# Patient Record
Sex: FEMALE | Race: WHITE | NOT HISPANIC OR LATINO | Employment: FULL TIME | URBAN - METROPOLITAN AREA
[De-identification: names, ages, dates, MRNs, and addresses within clinical notes are randomized per-mention and may not be internally consistent; named-entity substitution may affect disease eponyms.]

---

## 2018-12-18 ENCOUNTER — OFFICE VISIT - HEALTHEAST (OUTPATIENT)
Dept: FAMILY MEDICINE | Facility: CLINIC | Age: 23
End: 2018-12-18

## 2018-12-18 DIAGNOSIS — Z30.40 ENCOUNTER FOR SURVEILLANCE OF CONTRACEPTIVES, UNSPECIFIED CONTRACEPTIVE: ICD-10-CM

## 2019-02-01 ENCOUNTER — OFFICE VISIT - HEALTHEAST (OUTPATIENT)
Dept: FAMILY MEDICINE | Facility: CLINIC | Age: 24
End: 2019-02-01

## 2019-02-01 DIAGNOSIS — S05.8X2A SUPERFICIAL INJURY OF CORNEA, LEFT, INITIAL ENCOUNTER: ICD-10-CM

## 2019-08-22 ENCOUNTER — COMMUNICATION - HEALTHEAST (OUTPATIENT)
Dept: SCHEDULING | Facility: CLINIC | Age: 24
End: 2019-08-22

## 2019-08-23 ENCOUNTER — OFFICE VISIT - HEALTHEAST (OUTPATIENT)
Dept: FAMILY MEDICINE | Facility: CLINIC | Age: 24
End: 2019-08-23

## 2019-08-23 DIAGNOSIS — H53.8 BLURRED VISION: ICD-10-CM

## 2019-08-23 DIAGNOSIS — Z11.3 ROUTINE SCREENING FOR STI (SEXUALLY TRANSMITTED INFECTION): ICD-10-CM

## 2019-08-23 DIAGNOSIS — G43.009 MIGRAINE WITHOUT AURA AND WITHOUT STATUS MIGRAINOSUS, NOT INTRACTABLE: ICD-10-CM

## 2019-08-23 DIAGNOSIS — Z30.40 ENCOUNTER FOR SURVEILLANCE OF CONTRACEPTIVES, UNSPECIFIED CONTRACEPTIVE: ICD-10-CM

## 2019-08-23 DIAGNOSIS — Z00.00 VISIT FOR PREVENTIVE HEALTH EXAMINATION: ICD-10-CM

## 2019-08-23 DIAGNOSIS — Z12.4 CERVICAL CANCER SCREENING: ICD-10-CM

## 2019-08-23 ASSESSMENT — MIFFLIN-ST. JEOR: SCORE: 1490.79

## 2019-08-26 LAB
BKR LAB AP ABNORMAL BLEEDING: NO
BKR LAB AP BIRTH CONTROL/HORMONES: NORMAL
BKR LAB AP CERVICAL APPEARANCE: NORMAL
BKR LAB AP GYN ADEQUACY: NORMAL
BKR LAB AP GYN INTERPRETATION: NORMAL
BKR LAB AP HPV REFLEX: NORMAL
BKR LAB AP LMP: NORMAL
BKR LAB AP PATIENT STATUS: NORMAL
BKR LAB AP PREVIOUS ABNORMAL: NORMAL
BKR LAB AP PREVIOUS NORMAL: NORMAL
C TRACH DNA SPEC QL PROBE+SIG AMP: NEGATIVE
HIGH RISK?: NO
N GONORRHOEA DNA SPEC QL NAA+PROBE: NEGATIVE
PATH REPORT.COMMENTS IMP SPEC: NORMAL
RESULT FLAG (HE HISTORICAL CONVERSION): NORMAL

## 2019-10-21 ENCOUNTER — OFFICE VISIT - HEALTHEAST (OUTPATIENT)
Dept: FAMILY MEDICINE | Facility: CLINIC | Age: 24
End: 2019-10-21

## 2019-10-21 DIAGNOSIS — J01.90 ACUTE NON-RECURRENT SINUSITIS, UNSPECIFIED LOCATION: ICD-10-CM

## 2019-10-21 DIAGNOSIS — J02.9 SORE THROAT: ICD-10-CM

## 2019-10-21 LAB — DEPRECATED S PYO AG THROAT QL EIA: NORMAL

## 2019-10-22 LAB — GROUP A STREP BY PCR: NORMAL

## 2019-12-18 ENCOUNTER — OFFICE VISIT - HEALTHEAST (OUTPATIENT)
Dept: FAMILY MEDICINE | Facility: CLINIC | Age: 24
End: 2019-12-18

## 2019-12-18 DIAGNOSIS — K21.9 GASTROESOPHAGEAL REFLUX DISEASE WITHOUT ESOPHAGITIS: ICD-10-CM

## 2019-12-22 ENCOUNTER — OFFICE VISIT - HEALTHEAST (OUTPATIENT)
Dept: FAMILY MEDICINE | Facility: CLINIC | Age: 24
End: 2019-12-22

## 2019-12-22 DIAGNOSIS — Z20.828 EXPOSURE TO INFLUENZA: ICD-10-CM

## 2019-12-22 DIAGNOSIS — R05.9 COUGH: ICD-10-CM

## 2019-12-22 LAB
FLUAV AG SPEC QL IA: NORMAL
FLUBV AG SPEC QL IA: NORMAL

## 2020-02-10 ENCOUNTER — OFFICE VISIT - HEALTHEAST (OUTPATIENT)
Dept: FAMILY MEDICINE | Facility: CLINIC | Age: 25
End: 2020-02-10

## 2020-02-10 DIAGNOSIS — N90.7 LABIAL CYST: ICD-10-CM

## 2020-02-11 ENCOUNTER — COMMUNICATION - HEALTHEAST (OUTPATIENT)
Dept: FAMILY MEDICINE | Facility: CLINIC | Age: 25
End: 2020-02-11

## 2020-02-14 ENCOUNTER — RECORDS - HEALTHEAST (OUTPATIENT)
Dept: ADMINISTRATIVE | Facility: OTHER | Age: 25
End: 2020-02-14

## 2020-02-22 ENCOUNTER — OFFICE VISIT - HEALTHEAST (OUTPATIENT)
Dept: FAMILY MEDICINE | Facility: CLINIC | Age: 25
End: 2020-02-22

## 2020-02-22 DIAGNOSIS — R10.11 RIGHT UPPER QUADRANT ABDOMINAL PAIN: ICD-10-CM

## 2020-02-24 ENCOUNTER — OFFICE VISIT - HEALTHEAST (OUTPATIENT)
Dept: FAMILY MEDICINE | Facility: CLINIC | Age: 25
End: 2020-02-24

## 2020-02-24 DIAGNOSIS — R10.11 RUQ ABDOMINAL PAIN: ICD-10-CM

## 2020-02-25 ENCOUNTER — HOSPITAL ENCOUNTER (OUTPATIENT)
Dept: ULTRASOUND IMAGING | Facility: CLINIC | Age: 25
Discharge: HOME | End: 2020-02-25
Attending: FAMILY MEDICINE

## 2020-02-25 DIAGNOSIS — R10.11 RUQ ABDOMINAL PAIN: ICD-10-CM

## 2020-02-25 LAB
ALBUMIN SERPL-MCNC: 4.1 G/DL (ref 3.5–5)
ALP SERPL-CCNC: 67 U/L (ref 45–120)
ALT SERPL W P-5'-P-CCNC: 15 U/L (ref 0–45)
ANION GAP SERPL CALCULATED.3IONS-SCNC: 12 MMOL/L (ref 5–18)
AST SERPL W P-5'-P-CCNC: 18 U/L (ref 0–40)
BILIRUB SERPL-MCNC: 0.9 MG/DL (ref 0–1)
BUN SERPL-MCNC: 12 MG/DL (ref 8–22)
CALCIUM SERPL-MCNC: 9.4 MG/DL (ref 8.5–10.5)
CHLORIDE BLD-SCNC: 103 MMOL/L (ref 98–107)
CO2 SERPL-SCNC: 22 MMOL/L (ref 22–31)
CREAT SERPL-MCNC: 0.67 MG/DL (ref 0.6–1.1)
GFR SERPL CREATININE-BSD FRML MDRD: >60 ML/MIN/1.73M2
GLUCOSE BLD-MCNC: 87 MG/DL (ref 70–125)
POTASSIUM BLD-SCNC: 3.9 MMOL/L (ref 3.5–5)
PROT SERPL-MCNC: 7 G/DL (ref 6–8)
SODIUM SERPL-SCNC: 137 MMOL/L (ref 136–145)

## 2020-02-26 ENCOUNTER — AMBULATORY - HEALTHEAST (OUTPATIENT)
Dept: FAMILY MEDICINE | Facility: CLINIC | Age: 25
End: 2020-02-26

## 2020-02-26 DIAGNOSIS — R10.13 ABDOMINAL PAIN, EPIGASTRIC: ICD-10-CM

## 2020-03-24 ENCOUNTER — OFFICE VISIT - HEALTHEAST (OUTPATIENT)
Dept: FAMILY MEDICINE | Facility: CLINIC | Age: 25
End: 2020-03-24

## 2020-03-24 ENCOUNTER — COMMUNICATION - HEALTHEAST (OUTPATIENT)
Dept: FAMILY MEDICINE | Facility: CLINIC | Age: 25
End: 2020-03-24

## 2020-03-24 DIAGNOSIS — K21.9 GASTROESOPHAGEAL REFLUX DISEASE WITHOUT ESOPHAGITIS: ICD-10-CM

## 2020-08-11 ENCOUNTER — OFFICE VISIT - HEALTHEAST (OUTPATIENT)
Dept: FAMILY MEDICINE | Facility: CLINIC | Age: 25
End: 2020-08-11

## 2020-08-11 DIAGNOSIS — L73.9 HAIR FOLLICLE INFECTION: ICD-10-CM

## 2020-09-28 ENCOUNTER — COMMUNICATION - HEALTHEAST (OUTPATIENT)
Dept: INTERNAL MEDICINE | Facility: CLINIC | Age: 25
End: 2020-09-28

## 2020-09-28 ENCOUNTER — OFFICE VISIT - HEALTHEAST (OUTPATIENT)
Dept: INTERNAL MEDICINE | Facility: CLINIC | Age: 25
End: 2020-09-28

## 2020-09-28 DIAGNOSIS — K21.9 GASTROESOPHAGEAL REFLUX DISEASE WITHOUT ESOPHAGITIS: ICD-10-CM

## 2020-09-28 DIAGNOSIS — Z00.00 ANNUAL PHYSICAL EXAM: ICD-10-CM

## 2020-09-28 DIAGNOSIS — N89.8 VAGINAL DISCHARGE: ICD-10-CM

## 2020-09-28 DIAGNOSIS — Z76.89 ENCOUNTER TO ESTABLISH CARE: ICD-10-CM

## 2020-09-28 LAB
CLUE CELLS: NORMAL
HIV 1+2 AB+HIV1 P24 AG SERPL QL IA: NEGATIVE
TRICHOMONAS, WET PREP: NORMAL
YEAST, WET PREP: NORMAL

## 2020-09-28 ASSESSMENT — MIFFLIN-ST. JEOR: SCORE: 1488.63

## 2020-09-29 LAB
C TRACH DNA SPEC QL PROBE+SIG AMP: NEGATIVE
N GONORRHOEA DNA SPEC QL NAA+PROBE: NEGATIVE
T PALLIDUM AB SER QL: NEGATIVE

## 2020-09-30 ENCOUNTER — COMMUNICATION - HEALTHEAST (OUTPATIENT)
Dept: INTERNAL MEDICINE | Facility: CLINIC | Age: 25
End: 2020-09-30

## 2020-10-05 ENCOUNTER — OFFICE VISIT - HEALTHEAST (OUTPATIENT)
Dept: INTERNAL MEDICINE | Facility: CLINIC | Age: 25
End: 2020-10-05

## 2020-10-22 ENCOUNTER — COMMUNICATION - HEALTHEAST (OUTPATIENT)
Dept: FAMILY MEDICINE | Facility: CLINIC | Age: 25
End: 2020-10-22

## 2020-10-22 DIAGNOSIS — J02.9 SORE THROAT: ICD-10-CM

## 2020-10-23 RX ORDER — ALBUTEROL SULFATE 90 UG/1
2 AEROSOL, METERED RESPIRATORY (INHALATION) EVERY 6 HOURS PRN
Qty: 18 G | Refills: 3 | Status: SHIPPED | OUTPATIENT
Start: 2020-10-23 | End: 2021-11-24

## 2020-12-17 ENCOUNTER — COMMUNICATION - HEALTHEAST (OUTPATIENT)
Dept: INTERNAL MEDICINE | Facility: CLINIC | Age: 25
End: 2020-12-17

## 2020-12-18 ENCOUNTER — OFFICE VISIT - HEALTHEAST (OUTPATIENT)
Dept: FAMILY MEDICINE | Facility: CLINIC | Age: 25
End: 2020-12-18

## 2020-12-18 DIAGNOSIS — T15.92XA FOREIGN BODY OF LEFT EXTERNAL EYE, INITIAL ENCOUNTER: ICD-10-CM

## 2020-12-18 DIAGNOSIS — R03.0 ELEVATED BLOOD PRESSURE READING WITHOUT DIAGNOSIS OF HYPERTENSION: ICD-10-CM

## 2020-12-18 ASSESSMENT — MIFFLIN-ST. JEOR: SCORE: 1488.86

## 2020-12-23 ENCOUNTER — COMMUNICATION - HEALTHEAST (OUTPATIENT)
Dept: INTERNAL MEDICINE | Facility: CLINIC | Age: 25
End: 2020-12-23

## 2020-12-30 ENCOUNTER — OFFICE VISIT - HEALTHEAST (OUTPATIENT)
Dept: FAMILY MEDICINE | Facility: CLINIC | Age: 25
End: 2020-12-30

## 2020-12-30 ENCOUNTER — COMMUNICATION - HEALTHEAST (OUTPATIENT)
Dept: INTERNAL MEDICINE | Facility: CLINIC | Age: 25
End: 2020-12-30

## 2020-12-30 DIAGNOSIS — R30.0 DYSURIA: ICD-10-CM

## 2020-12-30 DIAGNOSIS — Z20.828 EXPOSURE TO HERPES SIMPLEX VIRUS (HSV): ICD-10-CM

## 2020-12-30 DIAGNOSIS — B96.89 BACTERIAL VAGINOSIS: ICD-10-CM

## 2020-12-30 DIAGNOSIS — N89.8 VAGINAL IRRITATION: ICD-10-CM

## 2020-12-30 DIAGNOSIS — N76.0 BACTERIAL VAGINOSIS: ICD-10-CM

## 2020-12-30 LAB
ALBUMIN UR-MCNC: NEGATIVE MG/DL
APPEARANCE UR: CLEAR
BACTERIA #/AREA URNS HPF: ABNORMAL HPF
BILIRUB UR QL STRIP: NEGATIVE
CLUE CELLS: ABNORMAL
COLOR UR AUTO: YELLOW
GLUCOSE UR STRIP-MCNC: NEGATIVE MG/DL
HGB UR QL STRIP: NEGATIVE
KETONES UR STRIP-MCNC: NEGATIVE MG/DL
LEUKOCYTE ESTERASE UR QL STRIP: ABNORMAL
NITRATE UR QL: NEGATIVE
PH UR STRIP: 7.5 [PH] (ref 5–8)
RBC #/AREA URNS AUTO: ABNORMAL HPF
SP GR UR STRIP: 1.02 (ref 1–1.03)
SQUAMOUS #/AREA URNS AUTO: ABNORMAL LPF
TRANS CELLS #/AREA URNS HPF: ABNORMAL LPF
TRICHOMONAS, WET PREP: ABNORMAL
UROBILINOGEN UR STRIP-ACNC: ABNORMAL
WBC #/AREA URNS AUTO: ABNORMAL HPF
YEAST, WET PREP: ABNORMAL

## 2020-12-31 ENCOUNTER — COMMUNICATION - HEALTHEAST (OUTPATIENT)
Dept: INTERNAL MEDICINE | Facility: CLINIC | Age: 25
End: 2020-12-31

## 2020-12-31 LAB
HSV1 IGG SERPL QL IA: NEGATIVE
HSV2 IGG SERPL QL IA: POSITIVE

## 2021-01-01 ENCOUNTER — AMBULATORY - HEALTHEAST (OUTPATIENT)
Dept: FAMILY MEDICINE | Facility: CLINIC | Age: 26
End: 2021-01-01

## 2021-01-01 DIAGNOSIS — N39.0 URINARY TRACT INFECTION WITHOUT HEMATURIA, SITE UNSPECIFIED: ICD-10-CM

## 2021-01-01 LAB — BACTERIA SPEC CULT: ABNORMAL

## 2021-01-28 ENCOUNTER — COMMUNICATION - HEALTHEAST (OUTPATIENT)
Dept: SCHEDULING | Facility: CLINIC | Age: 26
End: 2021-01-28

## 2021-01-28 ENCOUNTER — OFFICE VISIT - HEALTHEAST (OUTPATIENT)
Dept: INTERNAL MEDICINE | Facility: CLINIC | Age: 26
End: 2021-01-28

## 2021-01-28 DIAGNOSIS — N89.8 VAGINAL DISCHARGE: ICD-10-CM

## 2021-01-28 DIAGNOSIS — R05.9 COUGH: ICD-10-CM

## 2021-01-28 DIAGNOSIS — J02.9 SORE THROAT: ICD-10-CM

## 2021-01-28 RX ORDER — ACYCLOVIR 400 MG/1
TABLET ORAL
Status: SHIPPED | COMMUNITY
Start: 2020-12-22 | End: 2021-11-24

## 2021-01-29 ENCOUNTER — AMBULATORY - HEALTHEAST (OUTPATIENT)
Dept: FAMILY MEDICINE | Facility: CLINIC | Age: 26
End: 2021-01-29

## 2021-01-29 DIAGNOSIS — R05.9 COUGH: ICD-10-CM

## 2021-01-29 DIAGNOSIS — J02.9 SORE THROAT: ICD-10-CM

## 2021-01-30 ENCOUNTER — COMMUNICATION - HEALTHEAST (OUTPATIENT)
Dept: SCHEDULING | Facility: CLINIC | Age: 26
End: 2021-01-30

## 2021-01-30 ENCOUNTER — COMMUNICATION - HEALTHEAST (OUTPATIENT)
Dept: INTERNAL MEDICINE | Facility: CLINIC | Age: 26
End: 2021-01-30

## 2021-01-30 LAB
SARS-COV-2 PCR COMMENT: ABNORMAL
SARS-COV-2 RNA SPEC QL NAA+PROBE: POSITIVE
SARS-COV-2 VIRUS SPECIMEN SOURCE: ABNORMAL

## 2021-02-05 ENCOUNTER — COMMUNICATION - HEALTHEAST (OUTPATIENT)
Dept: INTERNAL MEDICINE | Facility: CLINIC | Age: 26
End: 2021-02-05

## 2021-02-10 ENCOUNTER — COMMUNICATION - HEALTHEAST (OUTPATIENT)
Dept: INTERNAL MEDICINE | Facility: CLINIC | Age: 26
End: 2021-02-10

## 2021-02-10 ENCOUNTER — OFFICE VISIT - HEALTHEAST (OUTPATIENT)
Dept: INTERNAL MEDICINE | Facility: CLINIC | Age: 26
End: 2021-02-10

## 2021-02-10 DIAGNOSIS — N89.8 VAGINAL CYST: ICD-10-CM

## 2021-02-10 DIAGNOSIS — L72.0 EPIDERMOID CYST OF SKIN: ICD-10-CM

## 2021-02-10 DIAGNOSIS — N89.8 VAGINAL DISCHARGE: ICD-10-CM

## 2021-02-10 LAB
CLUE CELLS: NORMAL
TRICHOMONAS, WET PREP: NORMAL
YEAST, WET PREP: NORMAL

## 2021-02-18 ENCOUNTER — RECORDS - HEALTHEAST (OUTPATIENT)
Dept: ADMINISTRATIVE | Facility: OTHER | Age: 26
End: 2021-02-18

## 2021-03-18 ENCOUNTER — COMMUNICATION - HEALTHEAST (OUTPATIENT)
Dept: INTERNAL MEDICINE | Facility: CLINIC | Age: 26
End: 2021-03-18

## 2021-04-19 ENCOUNTER — COMMUNICATION - HEALTHEAST (OUTPATIENT)
Dept: INTERNAL MEDICINE | Facility: CLINIC | Age: 26
End: 2021-04-19

## 2021-05-31 NOTE — PROGRESS NOTES
Mer,  The results on your Pap smear and chlamydia screening have come back and  both look normal.  Recommendation would be to repeat your Pap smear in 3 years.  Please let me know if you are having continued visual symptoms but hope those resolved.  Thank you,  Asa Barclay MD

## 2021-05-31 NOTE — TELEPHONE ENCOUNTER
Triage call:     Patient is calling with reports of visual changes that have been intermittent over the last week to week and a half. Patient reports that the episodes are very brief and 90% of the day she can see well. Describes it that it is like there is light shining in her eye or its blurry. Has never been to an eye doctor, denies additional symptoms- no pain. Not currently present. Noticed mainly in the right eye. 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 Advised her to ask about a recommended eye doctor to get an eye exam at her appointment- patient verbalizes understanding.      Triaged to be seen within the next 3 days- reviewed additional care advice with patient and she verbalizes understanding. Patient warm transferred to scheduling for appointment. Appointment with Dr Barclay scheduled for tomorrow @ 1:40 pm     Vinita Estrella RN BSBA [facility] Triage/Med Refill 8/22/2019 11:41 AM     Reason for Disposition    Brief (now gone) blurred vision and unexplained    Protocols used: VISION LOSS OR CHANGE-A-OH

## 2021-05-31 NOTE — PROGRESS NOTES
Assessment:      Healthy female exam.      1. Encounter for surveillance of contraceptives, unspecified contraceptive    - SPRINTEC, 28, 0.25-35 mg-mcg per tablet; Take 1 tablet by mouth daily.  Dispense: 3 Package; Refill: 3  The patient has been off of her oral contraceptive for about a year  Last menstrual period 2019 and cycles are every 28 to 32 days and her period lasts 5 to 7 days moderate flow with no pain.   0  Using condoms for contraception  Two lifelong partners    2. Cervical cancer screening    - Gynecologic Cytology (PAP Smear)  Pap smear 3 years ago negative and has had chlamydia and GC screening in the past which is been negative; due for both    3. Routine screening for STI (sexually transmitted infection)    - Chlamydia trachomatis & Neisseria gonorrhoeae, Amplified Detection  No symptoms such as unexplained vaginal discharge burning with urination or pelvic pain    4. Visit for preventive health examination  The patient has intermittent stridor or wheezing which is relatively brief with no shortness of breath but she will occasionally use a albuterol inhaler.  Is fairly random but at times seems to be related to exertion but not always    5. Blurred vision  Symptoms started 10 days ago most days intermittent lasting up to 15 minutes with a sense of a bright light shining into her eyes and blurry vision with difficulty focusing.  The patient has had a history of migraine headaches 1 every month to every other month but never has aura or scintillations scotomas but does have photosensitivity.  Other than the brief episodes of blurred vision her vision seems fine with no headache associated with the symptoms.  No double vision    If her symptoms persist or get worse she needs to be seen by an eye clinic.  Optometry would be fine to make sure a dilated exam is done to look at the back of the eye.  If those results are normal and her symptoms persist then would need to consider other  referrals and imaging.     Plan:       Await pap smear results.  Chlamydia specimen.  Follow up: If Pap smear normal 3-year follow-up    Subjective:      Mer Gonzalez is a 24 y.o. female who presents for an annual exam. The patient is sexually active. The patient participates in regular exercise: no. The patient reports that there is not domestic violence in her life.      Healthy Habits:   Regular Exercise: No   Is active both at work and particularly in the summer likes to walk and bike  Sunscreen Use: No  Healthy Diet: No  Dental Visits Regularly: Yes  Seat Belt: Yes  Sexually active: Yes  Self Breast Exam Monthly:Yes  Hemoccults: No  Flex Sig: No  Colonoscopy: No  Lipid Profile: Yes  Glucose Screen: Yes  Prevention of Osteoporosis: Yes and Eats dairy  Last Dexa: No  Guns at Home:  No      Immunization History   Administered Date(s) Administered     DTP 1995, 1995, 05/20/1996, 07/29/1996, 09/05/2000     DTP / HiB 1995, 1995, 05/20/1996, 07/29/1996     DTaP / HiB 1995, 1995, 05/20/1996, 07/29/1996     Dtap 09/05/2000     HPV Quadrivalent 05/09/2007, 07/09/2007     Hep B, Adult 1995, 1995, 05/20/1996     Hep B, Peds or Adolescent 1995, 1995, 05/20/1996, 12/18/2001, 01/15/2002, 08/20/2002     Hep B, historic 12/18/2001, 01/15/2002, 08/20/2002     HiB, historic,unspecified 1995, 1995, 05/20/1996, 07/29/1996     IPV 09/05/2000     Influenza, Live, Nasal LAIV3 11/04/2014     Influenza,live, Nasal Laiv4 11/04/2014     Influenza,seasonal quad, PF, 36+MOS 10/27/2017, 12/18/2018     MMR 07/29/1996     MMRV 09/06/2007     Meningococcal MCV4O 11/04/2014, 11/04/2014     OPV,Trivalent,Historic([id number] only) 1995, 1995, 05/20/1996     POLIO, Unspecified 1995, 1995, 05/20/1996, 09/05/2000     Tdap 09/06/2007, 01/09/2018     Immunization status: up to date and documented.       Gynecologic History  Patient's last menstrual period  was 08/01/2019.  Contraception: condoms  Last Pap: 10/14/16. Results were: normal  Last mammogram: N/A. Results were: N/A      OB History   No data available       Current Outpatient Medications   Medication Sig Dispense Refill     albuterol (PROAIR HFA;PROVENTIL HFA;VENTOLIN HFA) 90 mcg/actuation inhaler Inhale 2 puffs.       SPRINTEC, 28, 0.25-35 mg-mcg per tablet Take 1 tablet by mouth daily. 3 Package 3     No current facility-administered medications for this visit.      History reviewed. No pertinent past medical history.  History reviewed.  History of tonsillectomy  Amoxicillin and Latex  Family History   Problem Relation Age of Onset     Kidney cancer Father         age 48     Allergic rhinitis Sister      Social History     Socioeconomic History     Marital status: Single     Spouse name: Not on file     Number of children: Not on file     Years of education: Not on file     Highest education level: Not on file   Occupational History     Not on file   Social Needs     Financial resource strain: Not on file     Food insecurity:     Worry: Not on file     Inability: Not on file     Transportation needs:     Medical: Not on file     Non-medical: Not on file   Tobacco Use     Smoking status: Never Smoker     Smokeless tobacco: Never Used   Substance and Sexual Activity     Alcohol use: Not Currently     Drug use: Never     Sexual activity: Yes     Partners: Male   Lifestyle     Physical activity:     Days per week: Not on file     Minutes per session: Not on file     Stress: Not on file   Relationships     Social connections:     Talks on phone: Not on file     Gets together: Not on file     Attends Baptism service: Not on file     Active member of club or organization: Not on file     Attends meetings of clubs or organizations: Not on file     Relationship status: Not on file     Intimate partner violence:     Fear of current or ex partner: Not on file     Emotionally abused: Not on file     Physically  "abused: Not on file     Forced sexual activity: Not on file   Other Topics Concern     Not on file   Social History Narrative     Not on file       Review of Systems  Review of Systems   Constitutional: Negative.    HENT: Negative.    Eyes: Positive for visual disturbance.   Respiratory: Positive for wheezing.         Wheezing is intermittent random and not associated with shortness of breath.  She feels like it is more of a problem with stridor in her upper throat area and actually coming from her lungs although does use albuterol inhaler   Cardiovascular: Negative.    Gastrointestinal: Negative.    Endocrine: Negative.    Genitourinary: Negative.    Musculoskeletal: Negative.    Skin:        Some dryness to her skin with mild rash   Allergic/Immunologic: Negative.    Neurological: Positive for headaches.        Migraine headache without aura once a month or once every other month treated with over-the-counter medications and not causing significant disability   Hematological: Negative.    Psychiatric/Behavioral: Negative.              Objective:         Vitals:    08/23/19 1328 08/23/19 1427   BP: 140/81 120/88   Pulse: 90    SpO2: 99%    Weight: 180 lb 3.2 oz (81.7 kg)    Height: 5' 0.75\" (1.543 m)      Body mass index is 34.33 kg/m .    Physical  Physical Exam   Constitutional: She is oriented to person, place, and time. She appears well-developed and well-nourished. No distress.   HENT:   Head: Normocephalic and atraumatic.   Nose: Nose normal.   Mouth/Throat: Oropharynx is clear and moist. No oropharyngeal exudate.   Eyes: Pupils are equal, round, and reactive to light. EOM are normal. Right eye exhibits no discharge. Left eye exhibits no discharge. No scleral icterus.   Neck: Normal range of motion. No thyromegaly present.   Cardiovascular: Normal rate, regular rhythm, normal heart sounds and intact distal pulses.   No murmur heard.  Pulmonary/Chest: Effort normal and breath sounds normal. She has no wheezes. "   Abdominal: Soft. Bowel sounds are normal. She exhibits no mass. There is no tenderness.   Genitourinary: Vagina normal and uterus normal. No vaginal discharge found.   Genitourinary Comments: Cervix is mildly inflamed with minimal bleeding related to the Pap smear.  No cervical motion tenderness uterus midline normal size no adnexal tenderness or masses   Musculoskeletal: Normal range of motion. She exhibits no edema or deformity.   Lymphadenopathy:     She has no cervical adenopathy.   Neurological: She is alert and oriented to person, place, and time. No cranial nerve deficit.   Skin: Skin is warm. No rash noted.   Psychiatric: She has a normal mood and affect. Her behavior is normal. Judgment and thought content normal.   Nursing note and vitals reviewed.

## 2021-06-02 VITALS — WEIGHT: 180 LBS

## 2021-06-02 VITALS — WEIGHT: 179 LBS

## 2021-06-02 NOTE — PROGRESS NOTES
Chief Complaint   Patient presents with     Sore Throat     Pt c/o sore throat, body aches, pressure in sinus and head, ears hurting and itching, R sinus draining, chills       HPI: 24-year-old female with past medical history of occasional reactive airway disease and migraine headache presents today with sore throat, body aches, wheezing and congestion as well as pain in her maxillary sinuses and upper teeth for the past 2 to 3 days in duration of moderate intensity.  This is in the context of her boyfriend having sinus infection.    ROS: No fever.  No vomiting or diarrhea.    SH:    reports that she has never smoked. She has never used smokeless tobacco. She reports previous alcohol use. She reports that she does not use drugs.      FH: The Patient's family history includes Allergic rhinitis in her sister; Kidney cancer in her father.     Meds:  Mer has a current medication list which includes the following prescription(s): sprintec (28), albuterol, and cefdinir.    O:  /88   Pulse 97   Temp 98  F (36.7  C)   Resp 16   Wt 181 lb (82.1 kg)   SpO2 98%   BMI 34.48 kg/m     Constitutional:    --Vitals as above  --No acute distress  Eyes-  --Sclera noninjected  --Lids and conjunctiva normal  ENT-  --TMs clear  --Sclera noninjected  --Pharynx moderately erythematous  --Pain to palpation of the maxillary sinuses left greater than right  Neck-  --Neck supple    Lymph-  --mild cervical lymphadenopathy  Lungs-  --Clear to Auscultation  Heart-  --Regular rate and rhythm  Skin-  --Pink and dry          A/P:   1.  Wheezing  Refilled inhaler to be used only occasionally.  The patient rarely uses inhaler when not ill.  - Rapid Strep A Screen-Throat  - Group A Strep, RNA Direct Detection, Throat  - albuterol (PROAIR HFA;PROVENTIL HFA;VENTOLIN HFA) 90 mcg/actuation inhaler; Inhale 2 puffs every 6 (six) hours as needed for wheezing.  Dispense: 1 Inhaler; Refill: 3    2. Acute non-recurrent sinusitis, unspecified  location  The patient has allergies to amoxicillin.  Given her other symptoms and the side effects of Levaquin will use Omnicef instead.  Recommended Lookout pot for nasal irrigation.  - cefdinir (OMNICEF) 300 MG capsule; Take 1 capsule (300 mg total) by mouth 2 (two) times a day for 14 days.  Dispense: 28 capsule; Refill: 0

## 2021-06-03 VITALS
WEIGHT: 188 LBS | RESPIRATION RATE: 14 BRPM | OXYGEN SATURATION: 97 % | HEART RATE: 89 BPM | TEMPERATURE: 98.2 F | BODY MASS INDEX: 35.82 KG/M2 | SYSTOLIC BLOOD PRESSURE: 130 MMHG | DIASTOLIC BLOOD PRESSURE: 80 MMHG

## 2021-06-03 VITALS — HEIGHT: 61 IN | BODY MASS INDEX: 34.02 KG/M2 | WEIGHT: 180.2 LBS

## 2021-06-03 VITALS
SYSTOLIC BLOOD PRESSURE: 130 MMHG | TEMPERATURE: 98 F | BODY MASS INDEX: 34.48 KG/M2 | DIASTOLIC BLOOD PRESSURE: 88 MMHG | OXYGEN SATURATION: 98 % | RESPIRATION RATE: 16 BRPM | WEIGHT: 181 LBS | HEART RATE: 97 BPM

## 2021-06-03 VITALS
RESPIRATION RATE: 16 BRPM | DIASTOLIC BLOOD PRESSURE: 78 MMHG | WEIGHT: 184.5 LBS | OXYGEN SATURATION: 98 % | TEMPERATURE: 98 F | SYSTOLIC BLOOD PRESSURE: 120 MMHG | HEART RATE: 89 BPM | BODY MASS INDEX: 35.15 KG/M2

## 2021-06-04 VITALS
BODY MASS INDEX: 30.76 KG/M2 | SYSTOLIC BLOOD PRESSURE: 134 MMHG | HEART RATE: 66 BPM | WEIGHT: 173.6 LBS | DIASTOLIC BLOOD PRESSURE: 88 MMHG | HEIGHT: 63 IN

## 2021-06-04 VITALS
DIASTOLIC BLOOD PRESSURE: 86 MMHG | BODY MASS INDEX: 35.66 KG/M2 | TEMPERATURE: 97.4 F | HEART RATE: 99 BPM | WEIGHT: 187.2 LBS | OXYGEN SATURATION: 96 % | SYSTOLIC BLOOD PRESSURE: 128 MMHG

## 2021-06-04 VITALS
SYSTOLIC BLOOD PRESSURE: 124 MMHG | WEIGHT: 188 LBS | RESPIRATION RATE: 16 BRPM | DIASTOLIC BLOOD PRESSURE: 84 MMHG | BODY MASS INDEX: 35.82 KG/M2 | HEART RATE: 88 BPM | OXYGEN SATURATION: 99 % | TEMPERATURE: 98 F

## 2021-06-04 VITALS
RESPIRATION RATE: 16 BRPM | SYSTOLIC BLOOD PRESSURE: 124 MMHG | WEIGHT: 164.56 LBS | HEART RATE: 88 BPM | TEMPERATURE: 98.4 F | BODY MASS INDEX: 31.35 KG/M2 | OXYGEN SATURATION: 98 % | DIASTOLIC BLOOD PRESSURE: 84 MMHG

## 2021-06-04 VITALS
OXYGEN SATURATION: 99 % | DIASTOLIC BLOOD PRESSURE: 89 MMHG | BODY MASS INDEX: 35.45 KG/M2 | WEIGHT: 186.1 LBS | HEART RATE: 95 BPM | SYSTOLIC BLOOD PRESSURE: 129 MMHG

## 2021-06-04 NOTE — PROGRESS NOTES
[facility]      CHIEF COMPLAINT/REASON FOR VISIT:  Chief Complaint   Patient presents with     Cough     cough, feverish, headache, pressure, chills nasal congestion started Wed was exposed to influenza       HISTORY:      HPI: Mer is a 24 y.o. female who started no feeling well last Wednesday when she was exposed to influenza by her sister. She shares she really got sick on Friday. She has since gotten worse with the past 24 hours being the worst part of the illness. She has cough, fever, headache, sinus pressure, nasal congestion. Mer denies any other concerns including vision changes, chest pain/pressure, difficulty breathing, SOB, abdominal pain, nausea, vomiting, diarrhea, dysuria, increasing weakness, increasing pain.     No past medical history on file.          Family History   Problem Relation Age of Onset     Kidney cancer Father         age 48     Allergic rhinitis Sister      Social History     Socioeconomic History     Marital status: Single     Spouse name: Not on file     Number of children: Not on file     Years of education: Not on file     Highest education level: Not on file   Occupational History     Not on file   Social Needs     Financial resource strain: Not on file     Food insecurity:     Worry: Not on file     Inability: Not on file     Transportation needs:     Medical: Not on file     Non-medical: Not on file   Tobacco Use     Smoking status: Never Smoker     Smokeless tobacco: Never Used   Substance and Sexual Activity     Alcohol use: Not Currently     Drug use: Never     Sexual activity: Yes     Partners: Male   Lifestyle     Physical activity:     Days per week: Not on file     Minutes per session: Not on file     Stress: Not on file   Relationships     Social connections:     Talks on phone: Not on file     Gets together: Not on file     Attends Tenriism service: Not on file     Active member of club or organization: Not on file     Attends meetings of  clubs or organizations: Not on file     Relationship status: Not on file     Intimate partner violence:     Fear of current or ex partner: Not on file     Emotionally abused: Not on file     Physically abused: Not on file     Forced sexual activity: Not on file   Other Topics Concern     Not on file   Social History Narrative     Not on file       REVIEW OF SYSTEM:  Per HPI    PHYSICAL EXAM:   /80 (Patient Site: Right Arm, Patient Position: Sitting, Cuff Size: Adult Regular)   Pulse 89   Temp 98.2  F (36.8  C)   Resp 14   Wt 188 lb (85.3 kg)   SpO2 97%   BMI 35.82 kg/m    General appearance: alert, appears stated age and cooperative  HEENT: Head is normocephalic with normal hair distribution. No evidence of trauma. Ears: Without lesions or deformity. No acute purulent discharge. Eyes: Conjunctivae pink with no scleral icterus or erythema. Nose: Normal mucosa and septum. Oropharnyx: mmm, no lesions present.  Lungs: clear to auscultation bilaterally, respirations without effort  Heart: regular rate and rhythm, S1, S2 normal, no murmur, click, rub or gallop  Abdomen: soft, non-tender; bowel sounds normal; no masses,  no organomegaly  Extremities: extremities normal, atraumatic, no cyanosis or edema  Pulses: 2+ and symmetric  Skin: Skin color, texture, turgor normal. No rashes or lesions  Neurologic: Grossly normal   Psych: interacts well with caregivers, exhibits logical thought processes and connections, pleasant      LABS:   influenza    ASSESSMENT:      ICD-10-CM    1. Cough R05 Influenza A/B Rapid Test- Nasal Swab   2. Exposure to influenza Z20.828 oseltamivir (TAMIFLU) 75 MG capsule       PLAN:    Influenza B  -Tamiflu 75 mg by mouth twice daily x5 days, given due to the fact that illness continues to worsen.  -Encourage fluids and supportive cares.  -Encouraged strict handwashing, covering cough, and keeping room neat and clean.   -Encouraged steam baths if patient can tolerate to help loosen  phlegm.  -Encouraged use of hot tea with honey to help loosen phlegm and clear cough.  -Close monitoring and follow up warranted.    All questions answered to patient's and father's satisfaction. No further questions posed, no comments or issues to report. Patient advised to return to primary care provider, urgent care or ER with any further complaints, issues or concerns.    Electronically signed by: Halley Salinas CNP

## 2021-06-04 NOTE — PROGRESS NOTES
"Chief Complaint   Patient presents with     Cough     Pt c/o throwing up in her mouth yesterday she coughed and now her throat hurts and burns       HPI: Very pleasant 24-year-old female who works for the [employer] [facility] [facility] [employer] presents today with feelings of burning acid in her throat.  She notes that twice over the last 2 to 3 weeks she had episodes where she had a mild episode of vomiting with acid in her throat and thereby burning her throat.  One time she states that it \"went through the wrong pipe\".  She has never had reflux in the past.  Is not associated abdominal pain or nausea.    ROS: No cough or congestion.  No shortness of breath.  No abdominal pain.  No bowel or bladder issues.    SH:    reports that she has never smoked. She has never used smokeless tobacco. She reports previous alcohol use. She reports that she does not use drugs.      FH: The Patient's family history includes Allergic rhinitis in her sister; Kidney cancer in her father.     Meds:  Mer has a current medication list which includes the following prescription(s): albuterol, sprintec (28), and omeprazole.    O:  /78   Pulse 89   Temp 98  F (36.7  C)   Resp 16   Wt 184 lb 8 oz (83.7 kg)   SpO2 98%   BMI 35.15 kg/m    Alert conversant no acute distress  Skin Pink and dry  ENT-normal, Pharynx not erythematous neck is supple  Lungs-clear to auscultation  Abdomen soft nontender no masses    A/P:   1. Gastroesophageal reflux disease without esophagitis  The patient has symptoms of GERD.  Will treat with omeprazole, and if not improving would consider further work-up including EGD.  No evidence of gallbladder involvement at this time.  - omeprazole (PRILOSEC) 20 MG capsule; Take 1 capsule (20 mg total) by mouth daily before breakfast.  Dispense: 30 capsule; Refill: 1                                          "

## 2021-06-05 VITALS
SYSTOLIC BLOOD PRESSURE: 140 MMHG | HEART RATE: 79 BPM | OXYGEN SATURATION: 99 % | DIASTOLIC BLOOD PRESSURE: 86 MMHG | WEIGHT: 172 LBS | BODY MASS INDEX: 31.46 KG/M2

## 2021-06-05 VITALS
SYSTOLIC BLOOD PRESSURE: 152 MMHG | BODY MASS INDEX: 32.28 KG/M2 | WEIGHT: 175.4 LBS | OXYGEN SATURATION: 98 % | HEIGHT: 62 IN | HEART RATE: 70 BPM | DIASTOLIC BLOOD PRESSURE: 105 MMHG

## 2021-06-06 NOTE — TELEPHONE ENCOUNTER
It appears Yoselyn faxed this order to [facility] on 02/11/20.   If anything further is needed from me, please let me know :)    ~ [facility]

## 2021-06-06 NOTE — TELEPHONE ENCOUNTER
Yoselyn,  Please see the patient's comment that she never received a call from OB/GYN yet.  I saw the patient just yesterday and placed the referral at that time.  Not sure if that just gets faxed to the GYN clinic and they call her or if we need to follow-up with calling the patient?  Please help the patient out.  Thank you,  Dr. Barclay

## 2021-06-06 NOTE — PROGRESS NOTES
Please let patient know that her ultrasound and lab work are all normal.  I would recommend doing an EGD to make sure that she does not have an ulcer.  I will place referral.

## 2021-06-06 NOTE — TELEPHONE ENCOUNTER
With June being out of the office we may need to check with the patient to see if she has been notified from the GYN clinic about an appointment for her?  If that has not happened we will have to have the backup specially  which I think is from [facility] assist in getting the referral sent to [facility].    Thank you,  Dr. Barclay

## 2021-06-06 NOTE — PROGRESS NOTES
Chief Complaint   Patient presents with     Abdominal Pain     Pt c/o upper abdominal pain, she was seen in urgent care. She was seen [facility] and given Septra, she only took one dose as her stomach hurt after taking it       HPI: Very pleasant 24-year-old female with a complicating past history of GERD, presents today with pain in her right upper quadrant and midepigastrium.  It occurred Friday night, early Saturday morning after eating greasy food.  She has a family history of gallbladder disease.  She never vomited and there is no nausea but there is abdominal pain.  It does not wake her up at night.  Food makes it worse.  She currently is not taking PPIs.    She also had been on Septra for a vaginal cyst.  When she went to the walk-in clinic 2 days ago she was told to discontinue that medication.  That note was reviewed.    ROS: No vaginal discharge.  No vomiting.  No rashes.  No change in bowel habit.    SH: Reviewed-see Snapshot for review.     FH: Reviewed-see Snapshot for review.    Meds:  Mer has a current medication list which includes the following prescription(s): albuterol and sulfamethoxazole-trimethoprim.    O:  /84   Pulse 88   Temp 98  F (36.7  C)   Resp 16   Wt 188 lb (85.3 kg)   LMP 02/12/2020 (Exact Date)   SpO2 99%   BMI 35.82 kg/m    Constitutional:    --Vitals as above  --No acute distress  Eyes-  --Sclera noninjected  --Lids and conjunctiva normal  Abdomen-  --Soft nontender no masses no guarding no rebound  Skin-  --Pink and dry    A/P:   1. RUQ abdominal pain  Most likely represents gallbladder disease although cannot rule out GERD or peptic ulcer disease.  Will check ultrasound, LFTs, and if all of those are normal would consider EGD.  - US Abdomen Limited; Future  - Comprehensive Metabolic Panel    2.  Vaginal cyst  -Follow-up with OB/GYN for vaginal cyst.

## 2021-06-06 NOTE — PROGRESS NOTES
ASSESSMENT/PLAN:       1. Labial cyst. Left      - Ambulatory referral to Gynecology  Because this has been a recurrent issue over the last few years and typically in the same location I like to have GYN see her.  She will continue with warm sits baths.  I did not start an antibiotic and we talked about STDs contributing to these sorts of cysts but she feels that there is no risk of that and thus no need to do any testing.  Of note is that she did have a Pap smear with STI screening in August 2019 which was negative    Her blood pressure is better today than at the last visit but still borderline high and will need to continue to monitor that.        Asa Barclay MD      PROGRESS NOTE   2/10/2020    SUBJECTIVE:  Mer Gonzalez is a 24 y.o. female  who presents for   Chief Complaint   Patient presents with     OTHER     Possible cyst on vagina, lump that keeps getting larger and is very painful  and has been there for about a month and a half      The patient is here with a cyst in the labial area on the left side that has fluctuated in size over the last 6 weeks.  She has had problems with this in the past and on one occasion did have a cyst incised and drained.  This particular cyst has drained some but yesterday it was quite a bit larger than it had been over the last 6 weeks.  She has done some warm sitz baths.  She feels that her risk for a STI is low.  She is not had any urinary symptoms.  Last menstrual period 2/8/2020.  She no longer is on oral contraceptives.  Note that her blood pressure is still borderline high.    Patient Active Problem List   Diagnosis     Migraine without aura and without status migrainosus, not intractable       Current Outpatient Medications   Medication Sig Dispense Refill     albuterol (PROAIR HFA;PROVENTIL HFA;VENTOLIN HFA) 90 mcg/actuation inhaler Inhale 2 puffs every 6 (six) hours as needed for wheezing. 1 Inhaler 3     SPRINTEC, 28, 0.25-35 mg-mcg per tablet Take 1 tablet by  mouth daily. 3 Package 3     No current facility-administered medications for this visit.        Social History     Tobacco Use   Smoking Status Never Smoker   Smokeless Tobacco Never Used           OBJECTIVE:        No results found for this or any previous visit (from the past 240 hour(s)).    Vitals:    02/10/20 1148 02/10/20 1149   BP: 131/90 129/89   Pulse: 95    SpO2: 99%    Weight: 186 lb 1.6 oz (84.4 kg)      Weight: 186 lb 1.6 oz (84.4 kg)          Physical Exam:  GENERAL APPEARANCE: 24-year-old female NAD, well hydrated, well nourished  SKIN:  Normal skin turgor, no lesions/rashes   genital exam:External genitalia reveals a labial minor cyst that is located at the mid position of the labia minor and it seems to point outward towards the skin rather than internally.  There is no active drainage or redness and it is about 1-1/2 cm in diameter.  No other external lesions were noted.  Speculum exam reveals actively menstruating female.  EXTREMITY: no edema and full ROM of all joints  NEURO: no focal findings

## 2021-06-07 NOTE — PROGRESS NOTES
THIS IS A TELEPHONE VISIT.    Abdominal pain      HPI: Patient presents after ER follow-up.  Was seen on 3/6/2020 emergency department.  Also had an endoscopy on 2/28/2020 and that was reviewed.  In summary the endoscopy showed mild irritation at the duodenum but no other pathology.  Patient felt better on PPIs.  Currently taking omeprazole twice daily.  Patient is being evaluated by telephone as [place] has restricted clinic visits due to the concern about coronavirus.    ROS: No vomiting or diarrhea.  No abdominal pain currently.  She is feeling much better.    SH: Reviewed-see Snapshot for review.     FH: Reviewed-see Snapshot for review.    Meds:  Mer has a current medication list which includes the following prescription(s): albuterol and sulfamethoxazole-trimethoprim.    O:  LMP 03/06/2020   Unable to assess    A/P:   1. Gastroesophageal reflux disease without esophagitis  The patient has symptoms consistent with GERD.  We discussed symptoms, causes, and I agreed to refill medication which was sent to [facility] pharmacy.  If not improving would consider HIDA scan.  - omeprazole (PRILOSEC) 20 MG capsule; Take 1 capsule (20 mg total) by mouth 2 (two) times a day before meals.  Dispense: 180 capsule; Refill: 1    12 minutes total time spent caring for patient.

## 2021-06-10 NOTE — PROGRESS NOTES
"S:  Patient presents for 2 issues.  The first are lesions on her left inner thigh and outer thigh.  They have been occurring over the past month, worse when she is hiking, and occasionally they will \"pop\" and she will get purulent material out.    She is wishes to discuss birth control.  She has questions about birth control and what type to use.    O:   Blood pressure 124/84 pulse 88 respirations 16 temperature 98.4  With my nurse in the room examination of the inner and outer thigh on the left shows 2 infected follicles.  (Patient notes that she shaves closely particularly in the perianal and perivaginal region.    A:   Infected follicle  Request for contraception    P:   We discussed the infected follicles and I encouraged the patient not to be aggressive and shaving.  This most likely is resulting infected follicles.  If getting worse would consider dermatology referral.  She will dress the lesions with bacitracin when open.    We discussed contraception and if patient decides to move forward will consider low-dose oral contraceptive as opposed to patches.  "

## 2021-06-12 NOTE — TELEPHONE ENCOUNTER
Refill Approved    Rx renewed per Medication Renewal Policy. Medication was last renewed on 10/21/19.    Charely Miguel, [facility] Triage/Med Refill 10/23/2020     Requested Prescriptions   Pending Prescriptions Disp Refills     albuterol (PROAIR HFA;PROVENTIL HFA;VENTOLIN HFA) 90 mcg/actuation inhaler [Pharmacy Med Name: Albuterol Sulfate HFA Inhalation Aerosol Solution 108 (90 Base) MCG/ACT] 18 g 0     Sig: Inhale 2 puffs every 6 (six) hours as needed for wheezing.       Albuterol/Levalbuterol Refill Protocol Passed - 10/22/2020 11:54 AM        Passed - PCP or prescribing provider visit in last year     Last office visit with prescriber/PCP: 8/11/2020 Wendy Gayle MD OR same dept: 8/11/2020 Wendy Gayle MD OR same specialty: 8/11/2020 Wendy Gayle MD Last physical: Visit date not found       Next appt within 3 mo: Visit date not found  Next physical within 3 mo: Visit date not found  Prescriber OR PCP: Wendy Gayle MD  Last diagnosis associated with med order: 1. Sore throat  - albuterol (PROAIR HFA;PROVENTIL HFA;VENTOLIN HFA) 90 mcg/actuation inhaler [Pharmacy Med Name: Albuterol Sulfate HFA Inhalation Aerosol Solution 108 (90 Base) MCG/ACT]; Inhale 2 puffs every 6 (six) hours as needed for wheezing.  Dispense: 18 g; Refill: 0    If protocol passes may refill for 6 months if within 3 months of last provider visit (or a total of 9 months). If patient requesting >1 inhaler per month refill x 6 months and have patient make appointment with provider.

## 2021-06-13 NOTE — PROGRESS NOTES
Impression:  Foreign body left eye    Procedure: Foreign body removal: Left eye was anesthetized with topical Alcaine drops.  The foreign body was easily removed with a cotton swab.  Patient tolerated the procedure well    Plan:  Her blood pressure is elevated today.  May be due to stress and pain.  She should have that rechecked in the near future and follow-up if it continues to be elevated.  Regarding the foreign body, she has an appointment on Monday to [facility].  If she is continuing to have any symptoms at that time she should go ahead and see [facility].      Chief Complaint:  Chief Complaint   Patient presents with     Eye Trauma     something in right eye since august         HPI:   Mer Gonzalez is a 25 y.o. female who presents to this clinic for the evaluation of foreign body sensation left eye.  Patient noticed a foreign body sensation in her left eye yesterday.  It continues to be there today.  She does not recall getting anything into the eye recently.  It is on the medial aspect of the left sclera.  Has not done any metal grinding.  No decrease in vision.  No redness in the eye.  She notices a small brownish spot on the medial left sclera      PMH:   No past medical history on file.  Past Surgical History:   Procedure Laterality Date     TONSILLECTOMY       WISDOM TOOTH EXTRACTION  2011         ROS:  All other systems negative    Meds:    Current Outpatient Medications:      albuterol (PROAIR HFA;PROVENTIL HFA;VENTOLIN HFA) 90 mcg/actuation inhaler, Inhale 2 puffs every 6 (six) hours as needed for wheezing., Disp: 18 g, Rfl: 3     omeprazole (PRILOSEC) 20 MG capsule, Take 1 capsule (20 mg total) by mouth 2 (two) times a day before meals., Disp: 180 capsule, Rfl: 1        Social:  Social History     Socioeconomic History     Marital status: Single     Spouse name: Not on file     Number of children: Not on file     Years of education: Not on file     Highest education level: Not on file  "  Occupational History     Not on file   Social Needs     Financial resource strain: Not on file     Food insecurity     Worry: Not on file     Inability: Not on file     Transportation needs     Medical: Not on file     Non-medical: Not on file   Tobacco Use     Smoking status: Never Smoker     Smokeless tobacco: Never Used   Substance and Sexual Activity     Alcohol use: Not Currently     Drug use: Never     Sexual activity: Not Currently     Partners: Male     Birth control/protection: Condom   Lifestyle     Physical activity     Days per week: Not on file     Minutes per session: Not on file     Stress: Not on file   Relationships     Social connections     Talks on phone: Not on file     Gets together: Not on file     Attends Islam service: Not on file     Active member of club or organization: Not on file     Attends meetings of clubs or organizations: Not on file     Relationship status: Not on file     Intimate partner violence     Fear of current or ex partner: Not on file     Emotionally abused: Not on file     Physically abused: Not on file     Forced sexual activity: Not on file   Other Topics Concern     Not on file   Social History Narrative     Not on file         Physical Exam:  Vitals:    12/18/20 1651   BP: (!) 152/105   Patient Site: Left Arm   Patient Position: Sitting   Cuff Size: Adult Regular   Pulse: 70   SpO2: 98%   Weight: 175 lb 6.4 oz (79.6 kg)   Height: 5' 2\" (1.575 m)      PERRL EOMI there is no corneal or conjunctival injection.  Left medial sclera shows possible small superficial brown or gray-colored foreign body.  Anterior chamber is clear.  Left cornea was stained with fluorescein and examined under a Woods lamp and shows no foreign body or corneal abrasion      Results:    No results found for this or any previous visit (from the past 24 hour(s)).    No results found.      Joni Farley MD  "

## 2021-06-13 NOTE — TELEPHONE ENCOUNTER
Reason for Call:  Other call back      Detailed comments: patient was checking status of this mychart msg. Per patient, she looked closer into her eye and saw something in her eye.    Phone Number Patient can be reached at: Home number on file [phone] (home)    Best Time: anytime    Can we leave a detailed message on this number?: Yes    Call taken on 12/18/2020 at 12:03 PM by Emily Heath

## 2021-06-14 NOTE — TELEPHONE ENCOUNTER
Patient says she is COVID-19 positive and wants to know what she should do.  Patient reports slight fever (99.5 F), cough, nasal congestion and drainage.  Reviewed care advice with caller per RN triage protocol re: home care, reasons to call back and timeframe to be seen by a healthcare provider.      Caller verbalized understanding.        Teodora Wilson RN/[facility]          Reason for Disposition    [1] COVID-19 diagnosed by positive lab test AND [2] mild symptoms (e.g., cough, fever, others) AND [3] no complications or SOB    Additional Information    Negative: SEVERE difficulty breathing (e.g., struggling for each breath, speaks in single words)    Negative: Difficult to awaken or acting confused (e.g., disoriented, slurred speech)    Negative: Bluish (or gray) lips or face now    Negative: Shock suspected (e.g., cold/pale/clammy skin, too weak to stand, low BP, rapid pulse)    Negative: Sounds like a life-threatening emergency to the triager    Negative: SEVERE or constant chest pain or pressure (Exception: mild central chest pain, present only when coughing)    Negative: MODERATE difficulty breathing (e.g., speaks in phrases, SOB even at rest, pulse 100-120)    Negative: [1] Headache AND [2] stiff neck (can't touch chin to chest)    Negative: MILD difficulty breathing (e.g., minimal/no SOB at rest, SOB with walking, pulse <100)    Negative: Chest pain or pressure    Negative: Patient sounds very sick or weak to the triager    Negative: Fever > 103 F (39.4 C)    Negative: [1] Fever > 101 F (38.3 C) AND [2] age > 60    Negative: [1] Fever > 100.0 F (37.8 C) AND [2] bedridden (e.g., nursing home patient, CVA, chronic illness, recovering from surgery)    Negative: [1] HIGH RISK patient (e.g., age > 64 years, diabetes, heart or lung disease, weak immune system) AND [2] new or worsening symptoms    Negative: [1] HIGH RISK patient AND [2] influenza is widespread in the community AND [3] ONE OR MORE  respiratory symptoms: cough, sore throat, runny or stuffy nose    Negative: [1] HIGH RISK patient AND [2] influenza exposure within the last 7 days AND [3] ONE OR MORE respiratory symptoms: cough, sore throat, runny or stuffy nose    Negative: Fever present > 3 days (72 hours)    Negative: [1] Fever returns after gone for over 24 hours AND [2] symptoms worse or not improved    Negative: [1] Continuous (nonstop) coughing interferes with work or school AND [2] no improvement using cough treatment per protocol    Negative: [1] COVID-19 infection suspected by caller or triager AND [2] mild symptoms (cough, fever, or others) AND [3] no complications or SOB    Negative: Cough present > 3 weeks    Protocols used: CORONAVIRUS (COVID-19) DIAGNOSED OR [id number] 1.3.21

## 2021-06-14 NOTE — TELEPHONE ENCOUNTER
Symptoms started yesterday    Nasal congestion - but nose is not blocked    No fever - 98.4    Cough    No breathing issues    Slight sore throat    Slight body aches    No ear pain    No headache    No nausea    No vomiting    No diarrhea    No chest pain or tightness    No wheezing    Per protocol patient should be seen today    COVID 19 Nurse Triage Plan/Patient Instructions    Please be aware that novel coronavirus (COVID-19) may be circulating in the community. If you develop symptoms such as fever, cough, or SOB or if you have concerns about the presence of another infection including coronavirus (COVID-19), please contact your health care provider or visit [email or web address].     Disposition/Instructions    Virtual Visit with provider recommended. Reference Visit Selection Guide.    Thank you for taking steps to prevent the spread of this virus.  o Limit your contact with others.  o Wear a simple mask to cover your cough.  o Wash your hands well and often.    Resources    [facility]: About COVID-19: [email or web address]    CDC: What to Do If You're Sick: www.cdc.gov/coronavirus/2019-ncov/about/steps-when-sick.html    CDC: Ending Home Isolation: www.cdc.gov/coronavirus/2019-ncov/hcp/disposition-in-home-patients.html     CDC: Caring for Someone: www.cdc.gov/coronavirus/2019-ncov/if-you-are-sick/care-for-someone.html     [facility]: Interim Guidance for Hospital Discharge to Home: www.health.[facility].[place]/diseases/coronavirus/hcp/hospdischarge.pdf    [facility] clinical trials (COVID-19 research studies): [email or web address]-clinical-trials     Below are the COVID-19 hotlines at the [place] Department of Health ([facility]). Interpreters are available.   o For health questions: Call 835-350-0715 or 1-703.313.4827 (7 a.m. to 7 p.m.)  o For questions about schools and childcare: Call 494-819-5257 or 1-923.529.4580 (7 a.m. to 7 p.m.)       Afua Santiago RN   [facility] Medication  Refill and Triage Nurse  9:46 AM  1/28/2021      Reason for Disposition    Colds with no complications    [1] COVID-19 infection suspected by caller or triager AND [2] mild symptoms (cough, fever, or others) AND [3] no complications or SOB    Additional Information    Negative: SEVERE difficulty breathing (e.g., struggling for each breath, speaks in single words)    Negative: Very weak (can't stand)    Negative: Sounds like a life-threatening emergency to the triager    Negative: Fever > 103 F (39.4 C)    Negative: Fever > 101 F (38.3 C) and over 60 years of age    Negative: Fever > 100.0 F (37.8 C) and has diabetes mellitus or a weak immune system (e.g., HIV positive, cancer chemotherapy, organ transplant, splenectomy, chronic steroids)    Negative: Fever > 100.0 F (37.8 C) and bedridden (e.g., nursing home patient, stroke, chronic illness, recovering from surgery)    Negative: Fever present > 3 days (72 hours)    Negative: Fever returns after gone for over 24 hours and symptoms worse or not improved    Negative: Sinus pain (not just congestion) and fever    Negative: Earache    Negative: Sinus congestion (pressure, fullness) present > 10 days    Negative: Nasal discharge present > 10 days    Negative: Using nasal washes and pain medicine > 24 hours and sinus pain (lower forehead, cheekbone, or eye) persists    Negative: Patient wants to be seen    Negative: Sore throat present > 5 days    Negative: SEVERE difficulty breathing (e.g., struggling for each breath, speaks in single words)    Negative: Difficult to awaken or acting confused (e.g., disoriented, slurred speech)    Negative: Bluish (or gray) lips or face now    Negative: Shock suspected (e.g., cold/pale/clammy skin, too weak to stand, low BP, rapid pulse)    Negative: Sounds like a life-threatening emergency to the triager    Negative: SEVERE or constant chest pain or pressure (Exception: mild central chest pain, present only when coughing)    Negative:  MODERATE difficulty breathing (e.g., speaks in phrases, SOB even at rest, pulse 100-120)    Negative: [1] Headache AND [2] stiff neck (can't touch chin to chest)    Negative: MILD difficulty breathing (e.g., minimal/no SOB at rest, SOB with walking, pulse <100)    Negative: Chest pain or pressure    Negative: Patient sounds very sick or weak to the triager    Negative: Fever > 103 F (39.4 C)    Negative: [1] Fever > 101 F (38.3 C) AND [2] age > 60    Negative: [1] Fever > 100.0 F (37.8 C) AND [2] bedridden (e.g., nursing home patient, CVA, chronic illness, recovering from surgery)    Negative: [1] HIGH RISK patient (e.g., age > 64 years, diabetes, heart or lung disease, weak immune system) AND [2] new or worsening symptoms    Negative: [1] HIGH RISK patient AND [2] influenza is widespread in the community AND [3] ONE OR MORE respiratory symptoms: cough, sore throat, runny or stuffy nose    Negative: [1] HIGH RISK patient AND [2] influenza exposure within the last 7 days AND [3] ONE OR MORE respiratory symptoms: cough, sore throat, runny or stuffy nose    Negative: Fever present > 3 days (72 hours)    Negative: [1] Fever returns after gone for over 24 hours AND [2] symptoms worse or not improved    Negative: [1] Continuous (nonstop) coughing interferes with work or school AND [2] no improvement using cough treatment per protocol    Protocols used: COMMON COLD-A-OH, CORONAVIRUS (COVID-19) DIAGNOSED OR [id number]-W-ST 1.3.21

## 2021-06-14 NOTE — PROGRESS NOTES
Mer Gonzalez is a 25 y.o. female who is being evaluated via a billable telephone visit.      What phone number would you like to be contacted at? [phone]  How would you like to obtain your AVS? AVS Preference: [place].  Assessment & Plan     Mer was seen today for cough.    Diagnoses and all orders for this visit:    Vaginal discharge: Yellow in color, foul smelling. Recent treatment for a bacterial infection, she finished all medication. Spotting over the weekend with some cramping. Discussed she needs to be seen in person for follow up for a pelvic exam. ER if symptoms worsen prior to COVID testing coming back.     Sore throat/Cough: Started yesterday, dry cough, and sore throat. No known exposure. Will order a COVID test.   -     Symptomatic COVID-19 Virus (CORONAVIRUS) PCR; Future        No follow-ups on file.    Lesly Mauro, [facility]     Mer Gonzalez is 25 y.o. and presents to clinic today for the following health issues   HPI The patient reports a one day course of a sore throat and a cough. She denies a fever, chills, body aches, or loss of sense of taste or smell. She would like to get COVID tested.    She also reports having her period from 01/07-01/14. She denies any chance of pregnancy. She reports that starting about a week ago, she started spotting again. She reports over the weekend having some cramping and lower abdominal pain. She reports that today she had some yellowish discharge. She reports recently being treated for a bacterial infection and she finished this medication. She denies any urinary symptoms. She reports that the discharge is foul smelling.     Phone call duration: 8 minutes

## 2021-06-14 NOTE — PROGRESS NOTES
Clinic Note    Assessment:     Assessment and Plan:  1. Dysuria  Urinalysis is equivocal for UTI.  The sample is contaminated.  She feels like her dysuria symptoms have gotten remarkably better over the last 24 to 48 hours and so she is okay waiting for the UC results before starting an antibiotic  - Urinalysis-UC if Indicated    2. Vaginal irritation  Clue cells seen on wet prep.  She was sent home with a prescription for Flagyl  - Wet Prep, Vaginal    3. Exposure to herpes simplex virus (HSV)  - Herpes simplex Virus (Type 1 and 2) IgG Antibody       Patient Instructions   You have bacterial vaginosis.  We will treat this with Flagyl (metronidazole) 500 mg twice daily for 7 days.    Do not use alcohol while using this medication.    Try to abstain from sexual activity for the next week or so.    Your urinalysis is equivocal for UTI.  Because your symptoms have resolved, we will wait for a urine culture before sending in additional antibiotics to treat a UTI.    We will check some blood work today to evaluate for potential HSV (herpes simplex virus) those results we made available to you on [employer].           Subjective:      Mer Gonzalez is a 25 y.o. female who comes the clinic today for some dysuria symptoms as well as vaginal irritation.    She actually had an ophthalmology visit earlier today for some eye discomfort.  The ophthalmologist told her that there were some suspicious lesions on her retina that could be associated with herpes.  She became concerned and wanted to check serology IgG today at the clinic.    She has some increased frequency and urgency.    Having some whitish discolored vaginal discharge that is atypical for her.  No odor.  No itching.    She did have a wet prep done this past September.  Those results were negative.    The following portions of the patient's history were reviewed and updated as appropriate: Allergies, medications, problem list, prior note.     Review of  Systems:    Review is otherwise negative except for what is mentioned above.     Social Hx:    Social History     Tobacco Use   Smoking Status Never Smoker   Smokeless Tobacco Never Used         Objective:   There were no vitals filed for this visit.    Exam:    General: No apparent distress. Calm. Alert and Oriented X3. Pt behavior is appropriate.      Patient Active Problem List   Diagnosis     Migraine without aura and without status migrainosus, not intractable     Scoliosis     Current Outpatient Medications   Medication Sig Dispense Refill     albuterol (PROAIR HFA;PROVENTIL HFA;VENTOLIN HFA) 90 mcg/actuation inhaler Inhale 2 puffs every 6 (six) hours as needed for wheezing. 18 g 3     omeprazole (PRILOSEC) 20 MG capsule Take 1 capsule (20 mg total) by mouth 2 (two) times a day before meals. 180 capsule 1     No current facility-administered medications for this visit.            Liang Fong CNP (Rob)    12/30/2020

## 2021-06-14 NOTE — TELEPHONE ENCOUNTER
Coronavirus (COVID-19) Notification    Reason for call  Notify of POSITIVE  COVID-19 lab result, assess symptoms,  review [facility] recommendations    Lab Result   Lab test for 2019-nCoV rRt-PCR or SARS-COV-2 PCR  Oropharyngeal AND/OR nasopharyngeal swabs were POSITIVE for 2019-nCoV RNA [OR] SARS-COV-2 RNA (COVID-19) RNA     We have been unable to reach Patient by phone at this time to notify of their Positive COVID-19 result.  Unablle to leave a voicemail message requesting a call back to [phone] [facility] for results due to invalid phone number.        POSITIVE COVID-19 Letter sent.    Melvina Cuevas LPN

## 2021-06-14 NOTE — PATIENT INSTRUCTIONS - HE
You have bacterial vaginosis.  We will treat this with Flagyl (metronidazole) 500 mg twice daily for 7 days.    Do not use alcohol while using this medication.    Try to abstain from sexual activity for the next week or so.    Your urinalysis is equivocal for UTI.  Because your symptoms have resolved, we will wait for a urine culture before sending in additional antibiotics to treat a UTI.    We will check some blood work today to evaluate for potential HSV (herpes simplex virus) those results we made available to you on [employer].

## 2021-06-15 NOTE — PATIENT INSTRUCTIONS - HE
Bactrim DS as prescribed to help with the vaginal cyst. I have referred you to [facility] to discuss removal of this. Please see Dr. Brianna Daley, ([phone]) if they do not call you within the next few days.    I have also referred you to General surgery to remove the buttock epidermoid cyst. Call [phone] if they do not call you within the next week.    Monitor symptoms closely, follow up if having worsening pain, swelling, drainage, fever, chills, nausea, or vomiting.

## 2021-06-15 NOTE — PROGRESS NOTES
Clinic Note    Assessment:     Assessment and Plan:  1. Vaginal discharge: Scant amount of white vaginal discharge noted on exam today. Slight labial itching. Wet prep today was negative.   - Wet Prep, Vaginal    2. Vaginal cyst: Left inner vaginal area, 1cm round cyst, will treat with Bactrim DS. Will also refer to Gynecology. Discussed home supportive measures.  - Ambulatory referral to Gynecology  - sulfamethoxazole-trimethoprim (SEPTRA) 400-80 mg per tablet; Take 1 tablet by mouth 2 (two) times a day for 10 days.  Dispense: 20 tablet; Refill: 0  - Sitz baths.  -Monitor area closely, follow up if worsening as discussed.     3. Epidermoid cyst of skin: Left buttock fold. Will refer to general surgery for removal. Area gets inflamed and is painful when working out.  - Ambulatory referral to General Surgery       Patient Instructions   Bactrim DS as prescribed to help with the vaginal cyst. I have referred you to [facility] to discuss removal of this. Please see Dr. Brianna Daley, ([phone]) if they do not call you within the next few days.    I have also referred you to General surgery to remove the buttock epidermoid cyst. Call [phone] if they do not call you within the next week.    Monitor symptoms closely, follow up if having worsening pain, swelling, drainage, fever, chills, nausea, or vomiting.     Return if symptoms worsen or fail to improve.         Subjective:      Mer Gonzalez is a 25 y.o. female presents today with concerns of a left sided vaginal cyst.    She reports that she has been getting these cysts every couple of months for years. This time it is much more painful then prior. She reports that she has some swollen lymph nodes on the left side this time in her groin. She denies a fever, chills, nausea, or vomiting.    She denies any real vaginal discharge but does report some labial itching.    The cyst has not drained. With it's location, it is making it hard for her to walk.    She  also reports a area on her left butt cheek that also gets irritated, will swell, and drain, especially when she exercises. This is not bothersome now, but she would like this also removed.    She reports recovering from COVID well.    She denies any other concerns today.     The following portions of the patient's history were reviewed and updated as appropriate.    Review of Systems:    Review is otherwise negative except for what is mentioned above.     Social Hx:    Social History     Tobacco Use   Smoking Status Never Smoker   Smokeless Tobacco Never Used         Objective:     Vitals:    02/10/21 1605 02/10/21 1639   BP: (!) 145/101 140/86   Pulse: 79    SpO2: 99%    Weight: 172 lb (78 kg)        Physical Exam   Constitutional: She is well-developed, well-nourished, and in no distress. No distress.   Genitourinary:    Vulval lesion, erythema and tenderness present.      Vaginal discharge and exudate present.      Creamy  odorless and white found.      Genitourinary Comments: Vaginal left sided cyst, cm round, tender to touch, swollen, red in color. Labial white discharge noted. Wet prep obtained.      Skin: She is not diaphoretic.   Nursing note and vitals reviewed.        Patient Active Problem List   Diagnosis     Migraine without aura and without status migrainosus, not intractable     Scoliosis     Current Outpatient Medications   Medication Sig Dispense Refill     omeprazole (PRILOSEC) 20 MG capsule Take 1 capsule (20 mg total) by mouth 2 (two) times a day before meals. 180 capsule 1     acyclovir (ZOVIRAX) 400 MG tablet TAKE 1 TABLET BY MOUTH 5 TIMES A DAY FOR 7 DAYS       albuterol (PROAIR HFA;PROVENTIL HFA;VENTOLIN HFA) 90 mcg/actuation inhaler Inhale 2 puffs every 6 (six) hours as needed for wheezing. 18 g 3     sulfamethoxazole-trimethoprim (SEPTRA) 400-80 mg per tablet Take 1 tablet by mouth 2 (two) times a day for 10 days. 20 tablet 0     No current facility-administered medications for this visit.         Lesly Mauro, Adult-Geriatric Nurse Practitioner  [facility] - Internal Medicine Team     2/10/2021

## 2021-06-16 PROBLEM — G43.009 MIGRAINE WITHOUT AURA AND WITHOUT STATUS MIGRAINOSUS, NOT INTRACTABLE: Status: ACTIVE | Noted: 2019-08-23

## 2021-06-17 NOTE — PATIENT INSTRUCTIONS - HE
Patient Instructions by Halley Salinas CNP at 12/22/2019  1:20 PM     Author: Halley Salinas CNP Service: -- Author Type: Nurse Practitioner    Filed: 12/22/2019  1:47 PM Encounter Date: 12/22/2019 Status: Addendum    : Halley Salinas CNP (Nurse Practitioner)    Related Notes: Original Note by Halley Salinas CNP (Nurse Practitioner) filed at 12/22/2019  1:38 PM       Patient Education     The Flu (Influenza)     The virus that causes the flu spreads through the air in droplets when someone who has the flu coughs, sneezes, laughs, or talks.   The flu (influenza) is an infection that affects your respiratory tract. This tract is made up of your mouth, nose, and lungs, and the passages between them. Unlike a cold, the flu can make you very ill. And it can lead to pneumonia, a serious lung infection. The flu can have serious complications and even cause death.  Who is at risk for the flu?  Anyone can get the flu. But you are more likely to become infected if you:    Have a weakened immune system    Work in a healthcare setting where you may be exposed to flu germs    Live or work with someone who has the flu    Havent had an annual flu shot  How does the flu spread?  The flu is caused by a virus. The virus spreads through the air in droplets when someone who has the flu coughs, sneezes, laughs, or talks. You can become infected when you inhale these viruses directly. You can also become infected when you touch a surface on which the droplets have landed and then transfer the germs to your eyes, nose, or mouth. Touching used tissues, or sharing utensils, drinking glasses, or a toothbrush from an infected person can expose you to flu viruses, too.  What are the symptoms of the flu?  Flu symptoms tend to come on quickly and may last a few days to a few weeks. They include:    Fever usually higher than 100.4 F  (38 C) and chills    Sore throat and headache    Dry cough    Runny nose    Tiredness and  weakness    Muscle aches  Who is at risk for flu complications?  For some people, the flu can be very serious. The risk for complications is greater for:    Children younger than age 5    Adults ages 65 and older    People with a chronic illness such as diabetes or heart, kidney, or lung disease    People who live in a nursing home or long-term care facility  How is the flu treated?  The flu usually gets better after 7 days or so. In some cases, your healthcare provider may prescribe an antiviral medicine. This may help you get well a little sooner. For the medicine to help, you need to take it as soon as possible (ideally within 48 hours) after your symptoms start. If you develop pneumonia or other serious illness, you may need to stay in the hospital.  Easing flu symptoms    Drink lots of fluids such as water, juice, and warm soup. A good rule is to drink enough so that you urinate your normal amount.    Get plenty of rest.    Ask your healthcare provider what to take for fever and pain.    Call your provider if your fever is 100.4 F (38 C) or higher, or you become dizzy, lightheaded, or short of breath.  Taking steps to protect others    Wash your hands often, especially after coughing or sneezing. Or clean your hands with an alcohol-based hand  containing at least 60% alcohol.    Cough or sneeze into a tissue. Then throw the tissue away and wash your hands. If you dont have a tissue, cough and sneeze into your elbow.    Stay home until at least 24 hours after you no longer have a fever or chills. Be sure the fever isnt being hidden by fever-reducing medicine.    Dont share food, utensils, drinking glasses, or a toothbrush with others.    Ask your healthcare provider if others in your household should get antiviral medicine to help them preventinfection.  How can the flu be prevented?    One of the best ways to prevent the flu is to get a flu vaccine each year. The CDC recommends that all people 6 months  of age and older get a flu vaccine every year. The virus that causes the flu changes from year to year. For that reason, healthcare providers advise getting the flu vaccine each year as soon as it's available in your area. The vaccine is usually given as a shot, which is usually the first choice of experts. Other forms like a nasal spray or needle-free vaccine are available for some people. Your healthcare provider can tell you which vaccine is right for you.    Wash your hands often. Frequent handwashing is a proven way to help prevent infection.    Carry an alcohol-based hand gel containing at least 60% alcohol. Use it when you can't use soap and water. Then wash your hands as soon as you can.    Try not to touch your eyes, nose, or mouth.    At home and work, clean phones, computer keyboards, and toys often with disinfectant wipes.    If possible, don't have close contact with others who have the flu or symptoms of the flu.  Handwashing tips  Handwashing is one of the best ways to prevent many common infections. If you are caring for or visiting someone with the flu, wash your hands each time you enter and leave the room. Follow these steps:    Use warm water and plenty of soap. Rub your hands together well.    Clean the whole hand, including under your nails, between your fingers, and up the wrists.    Wash for at least 15 seconds.    Rinse, letting the water run down your fingers, not up your wrists.    Dry your hands well. Use a paper towel to turn off the faucet and open the door.  Using alcohol-based hand   Alcohol-based hand  are also a good choice. Use them when you can't use soap and water. Follow these steps:    Squeeze about a tablespoon of gel into the palm of one hand.    Rub your hands together briskly, cleaning the backs of your hands, the palms, between your fingers, and up the wrists.    Rub until the gel is gone and your hands are completely dry.  Preventing the flu in healthcare  settings  The flu is a special concern for people in hospitals and long-term care facilities. To help prevent the spread of flu, many hospitals and nursing homes take these steps:    Healthcare providers wash their hands or use an alcohol-based hand  before and after treating each patient.    People with the flu have private rooms and bathrooms or share a room with someone with the same infection.    People who are at high risk for the flu but don't have it are encouraged to get the flu and pneumonia vaccines.    All healthcare workers are encouraged or required to get flu shots.  Date Last Reviewed: 12/1/2016 2000-2019 [employer]. [address] [place] [id number]. All rights reserved. This information is not intended as a substitute for professional medical care. Always follow your healthcare professional's instructions.           Influenza exposure--Likely Influenza  -Encouraged strict handwashing, covering cough, and keeping room neat and clean.   -Encouraged steam baths if patient can tolerate to help loosen phlegm.  -Encouraged use of hot tea with honey to help loosen phlegm and clear cough.  -Tylenol and Ibuprofen OTC for comfort.  -Tamiflu 75mg by two times a day x5 days.   -Mucinex 400 mg by mouth every 6 hours as needed for cough.   -Close monitoring and follow up warranted.

## 2021-06-17 NOTE — PATIENT INSTRUCTIONS - HE
Patient Instructions by Belkis Villatoro CNP at 2/1/2019  6:20 PM     Author: Belkis Villatoro CNP Service: -- Author Type: Nurse Practitioner    Filed: 2/1/2019  6:51 PM Encounter Date: 2/1/2019 Status: Addendum    : Belkis Villatoro CNP (Nurse Practitioner)    Related Notes: Original Note by Belkis Villatoro CNP (Nurse Practitioner) filed at 2/1/2019  6:50 PM         Patient Education     Foreign Object in the Cornea    Your cornea is the clear layer on the front of your eyeball. It focuses light and helps protect your eye from dust and germs. A foreign object can get into the cornea itself. A trapped speck of dirt or grit is often a minor problem. But anything metal, or an object that goes through (pierces) your cornea, can cause severe damage. For example, the cornea can be damages from foreign bodies that occur while grinding metal. The small pieces of metal travel toward the eye at high speed.  When to go to the emergency room (ER)  The longer you wait, the greater the chance of injury or infection. Seek emergency medical help right away for any of the following:    An object in your eye that you can't flush out with water    Your eye remains very swollen or painful after an object has been removed    An object embedded in your eye--cover both eyes with a sterile compress and call 911    The front of your eye (cornea) is white or hazy    Blood in your eye (hyphema)    You are having trouble seeing  What to expect in the ER    A healthcare provider will ask about your injury and examine your eye.    You may be given eye drops to ease any mild pain.    The provider will use a microscope with a bright light to help examine your eyeball. He or she may put a special dye (fluorescein dye) on the cornea to help see the object more clearly.    The provider may remove a loose foreign object.    Severe injuries are likely to be treated by an eye specialist  (ophthalmologist).    Antibiotic eye drops and possibly pain medicine will be prescribed if you are discharged home  Follow-up  Call your healthcare provider if you notice any of these symptoms after going home:    Fever of 100.4 F (38 C) or higher, or as directed by your healthcare provider    Increased redness or eye pain    Drainage from your eye    Blurred or decreased vision  Date Last Reviewed: 5/1/2017 2000-2017 [employer]. [address] [place] [address], [place] [id number]. All rights reserved. This information is not intended as a substitute for professional medical care. Always follow your healthcare professional's instructions.

## 2021-06-18 NOTE — PATIENT INSTRUCTIONS - HE
Patient Instructions by Joni Farley MD at 12/18/2020  4:40 PM     Author: Joni Farley MD Service: -- Author Type: Physician    Filed: 12/18/2020  5:21 PM Encounter Date: 12/18/2020 Status: Signed    : Joni Farley MD (Physician)         Patient Education     Particle in the Eye  The conjunctiva is a thin membrane in the eye. It covers the white of the eye and the inside of the eyelid. A very small object such as an eyelash or dirt can become trapped under the eyelid. This is called a conjunctival foreign body. This can be very irritating to the eye, no matter how small the object is. If the exam shows that you no longer have a particle in your eye, any discomfort should go away within the next 24 hours.  Home care     Hold a cool compress over the sore eye to relieve pain and swelling.     Put a cool compress on the eye that hurts. A cool compress is a towel soaked in cool water. Do this 3 to 4 times a day. It will help ease redness and swelling.    You can use artificial tears to ease irritation and redness, unless another medicine was prescribed. You can buy artificial tears at [employer].    You can use over-the-counter pain medicine such as acetaminophen or ibuprofen to control pain, unless another pain medicine was prescribed. If you have chronic liver or kidney disease, or if you have ever had a stomach ulcer or gastrointestinal bleeding, talk with your doctor before using these medicines.    If the foreign body causes a scratch on your cornea, the healthcare provider will likely prescribe an antibiotic eye drop.  Follow-up care  Follow up with your healthcare provider, or as advised.  When to seek medical advice  Contact your healthcare provider right away if any of these occur:    Increased swelling of the eyelid    Increased pain or redness in the eye    Drainage from the eye    Redness in the skin around the eye  Date Last Reviewed: 5/1/2017 2000-2017 [employer]  [address] [place], [place] [id number]. All rights reserved. This information is not intended as a substitute for professional medical care. Always follow your healthcare professional's instructions.           Patient Education     Discharge Instructions: Taking Your Blood Pressure  Blood pressure is the force of blood as it moves from the heart through the blood vessels. You can take your own blood pressure reading using a digital monitor. Take readings as often as your healthcare provider instructs. Take your readings each time in the same way, using the same arm. Here are guidelines for taking your blood pressure.  The American Heart Association (AHA) recommends purchasing a blood pressure monitor that is validated and approved by the Association for the Advancement of Medical Instrumentation, the [place] Hypertension Society, and the International Protocol for the Validation of Automated BP Measuring Devices. If the blood pressure monitor is for a senior adult, a pregnant woman, or a child, make certain it is validated for use with such a population. For the most reliable readings, the AHA recommends an automatic, cuff-style, upper arm (bicep) monitor. The readings from finger and wrist monitors are not as reliable as the upper arm monitor.        Step 1. Relax      Wait at least a half hour after smoking, eating, or exercising. Do not drink coffee, tea, soda, or other caffeinated beverages before checking your blood pressure.     Sit comfortably at a table. Place the monitor near you.    Rest for a few minutes before you begin.        Step 2. Wrap the cuff      Place your arm on the table, palm up. Put your arm in a position that is level with your heart. Wrap the cuff around your upper arm, about an inch above your elbow. Its best to wrap the cuff on bare skin, not over clothing.    Make sure your cuff fits. If it doesnt wrap around your upper arm, order a larger cuff. A cuff that is too large or too small  can result in an inaccurate blood pressure reading.           Step 3. Inflate the cuff      Pump the cuff until the scale reads 200. If you have a self-inflating cuff, push the button that starts the pump.    The cuff will tighten, then loosen.    The numbers will change. When they stop changing, your blood pressure reading will appear.    If you get a reading that is too high or too low for you, relax for a few minutes. Then do the test again.    Step 4. Write down the results    Write down your blood pressure numbers. Jarvis the date and time. Keep your results in one place, such as a notebook.    Remove the cuff from your arm. Turn off the machine.    Take the readings with you to your medical appointments.    If you start a new blood pressure medicine, or change a blood pressure medicine dose, note the day you started the new drug or dosage on your blood pressure recording sheet. This will help your healthcare provider monitor the effect of medication changes.     Date Last Reviewed: 4/27/2016 2000-2016 [employer]. [address] [place] [address], [place] [address]. All rights reserved. This information is not intended as a substitute for professional medical care. Always follow your healthcare professional's instructions.

## 2021-06-18 NOTE — PATIENT INSTRUCTIONS - HE
Patient Instructions by Chelsi Delgado MD at 2/22/2020  3:40 PM     Author: Chelsi Delgado MD Service: -- Author Type: Physician    Filed: 2/22/2020  4:22 PM Encounter Date: 2/22/2020 Status: Addendum    : Chelsi Delgado MD (Physician)    Related Notes: Original Note by Chelsi Delgado MD (Physician) filed at 2/22/2020  4:21 PM       1. Keep well hydrated  2. Avoid all alcohol ingestion until your issue with belly pain is resolved  2. May alternate Tylenol every 6 hours with ibuprofen every 6 hours as needed for fever or pain  3. Call the gynecologist and tell them about your pain after taking one dose of the sulfa medication - ask them if they want to order something else or see you  4. If you have any questions, call the clinic number  - it's answered 24/7    Patient Education     Gallstones with Biliary Colic    You have abdominal pain due to irritation and spasm of the gallbladder. This is called biliary colic. The gallbladder is a small sac under the liver, which stores and releases a fluid that aids in the digestion of fat. A collection of crystals may form stones inside the gallbladder (gallstones). Gallstones can cause the gallbladder to spasm. If they block the duct out of the gallbladder, they can cause pain and even an infection.   A number of factors increase the risk for having gallstones:    Being female    Being severely overweight (obese)    Older age    Losing or gaining weight quickly    Eating a high-calorie diet    Being pregnant    Taking hormone therapy    Having diabetes  Home care    Rest in bed.    Drink only clear liquids until you feel better.    You may have been prescribed medicine for pain or nausea. Take these as directed.    Fat in your diet makes the gallbladder contract and may cause increased pain. Avoid foods that are high in fat (such as full-fat dairy, fried foods, and fatty meats) for at least two days.    If you are overweight, talk to your healthcare provider  about losing weight.  Follow-up care  Follow up with your healthcare provider or as advised. There is a chance that you will have another episode of pain from your gallstones at some point. Removal of the gallbladder is an option to prevent this. Talk with your healthcare provider about your treatment options.  When to seek medical advice  Call your healthcare provider if any of the following occur:    Worsening pain or pain lasting for longer than 6 hours    Pain moving to the right lower abdomen    Repeated vomiting    Swollen abdomen    Fever of 100.4 F (38 C) or higher, or as directed by your healthcare provider    Very dark urine, light colored stools, or yellow color of the skin or eyes    Chest, arm, back, neck or jaw pain  Date Last Reviewed: 6/18/2015 2000-2017 [employer]. [address] [place] [address], [place] [address]. All rights reserved. This information is not intended as a substitute for professional medical care. Always follow your healthcare professional's instructions.

## 2021-06-18 NOTE — PATIENT INSTRUCTIONS - HE
Patient Instructions by Lesly Mauro CNP at 9/28/2020  4:30 PM     Author: Lesly Mauro CNP Service: -- Author Type: Nurse Practitioner    Filed: 9/28/2020  4:47 PM Encounter Date: 9/28/2020 Status: Addendum    : Lesly Mauro CNP (Nurse Practitioner)    Related Notes: Original Note by Lesly Mauro CNP (Nurse Practitioner) filed at 9/28/2020  4:47 PM       No intercourse until all of your lab tests are back.    Please let me know if you would like to start birth control in the future.    Continue your omeprazole as needed.    I will plan on seeing you back in a year with fasting labs, before then if anything comes up.  Patient Education     Prevention Guidelines, Women Ages 18 to 39  Screening tests and vaccines are an important part of managing your health. A screening test is done to find possible disorders or diseases in people who don't have any symptoms. The goal is to find a disease early so lifestyle changes can be made and you can be watched more closely to reduce the risk of disease, or to detect it early enough to treat it most effectively. Screening tests are not considered diagnostic, but are used to determine if more testing is needed. Health counseling is essential, too. Below are guidelines for these, for women ages 18 to 39. Talk with your healthcare provider to make sure youre up-to-date on what you need.  Screening Who needs it How often   Alcohol misuse All women in this age group At routine exams   Blood pressure All women in this age group Yearly checkup if your blood pressure is normal  Normal blood pressure is less than 120/80 mm Hg  If your blood pressure reading is higher than normal, follow the advice of your healthcare provider   Breast cancer All women in this age group should talk with their healthcare providers about the need for clinical breast exams (CBE)1 Clinical breast exam every 3 years1   Cervical cancer Women ages 21 and  older Women between ages 21 and 29 should have a Pap test every 3 years; women between ages 30 and 65 are advised to have a Pap test plus an HPV test every 5 years   Chlamydia Sexually active women ages 25 and younger, and women at increased risk for infection (such as having multiple sex partners) Every year if you're at risk or have symptoms   Depression All women in this age group At routine exams   Type 2 diabetes, prediabetes All women with no symptoms who are overweight or obese and have 1 or more other risk factors for diabetes At least every 3 years. Also, testing for diabetes during pregnancy after the 24th week.    Type 2 diabetes, prediabetes All women diagnosed with gestational diabetes Lifelong testing every 3 years   Type 2 diabetes All women with prediabetes Every year   Gonorrhea Sexually active women at increased risk for infection At routine exams   Hepatitis C Anyone at increased risk At routine exams   HIV All women should be tested at least once for HIV between the ages of 13 and 64 At routine exams. Those with risk factors for HIV should be tested at least annually.    Obesity All women in this age group At routine exams   Syphilis Women at increased risk for infection should talk with their healthcare provider At routine exams   Tuberculosis Women at increased risk for infection should talk with their healthcare provider Ask your healthcare provider   Vision All women in this age group At least 1 complete exam in your 20s, and 2 in your 30s   Vaccine2 Who needs it How often   Chickenpox (varicella) All women in this age group who have no record of this infection or vaccine 2 doses; the second dose should be given 4 to 8 weeks after the first dose   Hepatitis A Women at increased risk for infection should talk with their healthcare provider 2 doses given at least 6 months apart   Hepatitis B Women at increased risk for infection should talk with their healthcare provider 3 doses over 6 months;  second dose should be given 1 month after the first dose; the third dose should be given at least 2 months after the second dose and at least 4 months after the first dose   Haemophilus influenzae Type B (HIB) Women at increased risk for infection should talk with their healthcare provider 1 to 3 doses   Human papillomavirus (HPV) All women in this age group up to age 26 3 doses; the second dose should be given 1 to 2 months after the first dose and the third dose given 6 months after the first dose   Influenza (flu) All women in this age group Once a year   Measles, mumps, rubella (MMR) All women in this age group who have no record of these infections or vaccines 1 or 2 doses   Meningococcal Women at increased risk for infection should talk with their healthcare provider 1 or more doses   Pneumococcal conjugate vaccine (PCV13) and pneumococcal polysaccharide vaccine (PPSV23) Women at increased risk for infection should talk with their healthcare provider PCV13: 1 dose ages 19 to 65 (protects against 13 types of pneumococcal bacteria)  PPSV23: 1 to 2 doses through age 64, or 1 dose at 65 or older (protects against 23 types of pneumococcal bacteria)      Tetanus/diphtheria/pertussis (Td/Tdap) booster All women in this age group Td every 10 years, or a one-time dose of Tdap instead of a Td booster after age 18, then Td every 10 years   Counseling Who needs it How often   BRCA gene mutation testing for breast and ovarian cancer susceptibility Women with increased risk for having gene mutation When your risk is known   Breast cancer and chemoprevention Women at high risk for breast cancer When your risk is known   Diet and exercise Women who are overweight or obese When diagnosed, and then at routine exams   Domestic violence Women at the age in which they are able to have children At routine exams   Sexually transmitted infection prevention Women who are sexually active At routine exams   Skin cancer Prevention of  skin cancer in fair-skinned adults At routine exams   Use of tobacco and the health effects it can cause All women in this age group Every visit   1 According to the ACS, women ages 20 to 39 years should have a clinical breast exam (CBE) as part of their routine health exam every 3 years. Breast self-exams are an option for women starting in their 20s. But the USPSTF does not recommend CBE.  Date Last Reviewed: 10/1/2017    0878-8717 [employer]. [address] [place] [address]. All rights reserved. This information is not intended as a substitute for professional medical care. Always follow your healthcare professional's instructions.

## 2021-06-20 NOTE — LETTER
Letter by Chelsi Delgado MD at      Author: Chelsi Delgado MD Service: -- Author Type: --    Filed:  Encounter Date: 2/22/2020 Status: (Other)         February 22, 2020     Patient: Mer Gonzalez   YOB: 1995   Date of Visit: 2/22/2020       To Whom it May Concern:    Mer Gonzalez was seen in my clinic on 2/22/2020.    Should be able to return to work on 2/24/2020.     If you have any questions or concerns, please don't hesitate to call.    Sincerely,         Electronically signed by Chelsi Delgado MD

## 2021-06-22 NOTE — PROGRESS NOTES
Chief Complaint   Patient presents with     Foot Problem     Pt c/o L foot injury from July,2018 flaring up, she is having a dull annoying pain x 2 weeks. Pt was mowing the lawn and twisted her foot as she fell on her foot.        HPI: Very pleasant 23-year-old female with a past medical history of chest wall pain and occasional sore throats, presents to our clinic for evaluation of multiple issues.  The first is left ankle pain.  She injured her ankle with eversion well mowing her lawn in July of this year.  She did not seek medical care to the fact that she did not have insurance.  She reinjured it earlier this week by stepping on uneven ground.  The pain is medial, below the medial malleolus, there was no  swelling, she has been able to bear weight, and there is no distal paresthesias.    She is also requesting refill of her oral contraceptive.  Currently taking oral contraceptives without complication.  Pap smear was approximately 11 months ago and described as normal.  STD testing was reviewed from her [facility] record on 1/9/2018.  In summary she had testing and had all negative values.    ROS: 10 point system review complete notable for occasional hoarseness of her throat otherwise negative    SH:     She does not smoke currently unemployed     FH: The Patient's family history is not on file.  Notable for hypertension and cancer    Meds:  Mer has a current medication list which includes the following prescription(s): albuterol, hyoscyamine, and sprintec (28).    O:  /84   Pulse 85   Resp 16   Wt 179 lb (81.2 kg)   SpO2 98%   No acute distress  Skin pink and dry  Neck supple with no thyromegaly  Respiratory-no distress  Psych- oriented x3 with good memory insight and judgment  Neuro-moves all 4 extremities  Vascular-normal distal pulses in the left lower extremity  Muscular skeletal-left lower extremity has no pain to palpation with no swelling.  Normal range of motion.  Patient reports slight internal  pain over the spring ligament.    A/P:   1. Encounter for surveillance of contraceptives, unspecified contraceptive  Discussed contraception and patient will follow-up in 1 year.  Medications refilled  - SPRINTEC, 28, 0.25-35 mg-mcg per tablet; Take 1 tablet by mouth daily.  Dispense: 3 Package; Refill: 3    2.  Ankle sprain  -Minimal.  Recommended over-the-counter brace for lateral support.  If not improving or getting worse anyway would consider imaging.    3.  Health maintenance  -Update flu shot today

## 2021-06-23 NOTE — PROGRESS NOTES
Assessment:     1. Superficial injury of cornea, left, initial encounter  tetracaine (PF) 0.5 % ophthalmic solution 1 drop (PONTOCAINE)          Plan:     Differential diagnosis include but not limited to corneal abrasion, injury of the cornea, foreign object in the left eye.  On exam I did not find any foreign object.  Using saline drops, the eye was flushed several times.  Tetracaine 0.5% ophthalmic solution was instilled in the left eye, 1 drop.  Fluorescein stain was used.  Using fluorescein light visualization, no corneal abrasion noted on exam, and no foreign object noted in the eye either.  Discussed findings with mom and the patient.  At this time I think her symptoms are more consistent with irritation and rubbing of the eye.  Therefore advised patient to monitor for worsening symptoms.  I discussed red flag symptoms, return precautions to clinic/ER and follow up care with patient/parent.  Expected clinical course, symptomatic cares advised. Questions answered. Patient/parent amenable with plan.      Subjective:       23 y.o. female presents for evaluation of left eye irritation or foreign body.  Patient reports that the symptoms started about 3 days ago, she was cleaning up her car and she had dust into her eyes.  The right eye resolved right away but she noticed that the left eye feels like she has something stuck in it.  The irritation is worse today compared to the past 2 days.  She admits that she noticed that her eye has been irritated and tearing all the time.  It feels like some green, she has tried flushing the eye with water, use some eyedrops and has not relieved her symptoms.  She denies any visual problems, no trauma, no injury to the eye.  Currently she does not use contact lenses.  She denies any other symptoms including headache, dizziness, nausea, vomiting.    The following portions of the patient's history were reviewed and updated as appropriate: allergies, current medications, past  family history, past medical history, past social history, past surgical history and problem list.    Review of Systems  A 12 point comprehensive review of systems was negative except as noted.      Objective:      /80 (Patient Site: Right Arm, Patient Position: Sitting, Cuff Size: Adult Large)   Pulse 85   Temp 98  F (36.7  C) (Oral)   Resp 16   Wt 180 lb (81.6 kg)   LMP 01/09/2019 (Exact Date)   SpO2 99%   Breastfeeding? No   General appearance: alert, appears stated age, cooperative and mild distress  Head: Normocephalic, without obvious abnormality, atraumatic  Eyes: conjunctivae/corneas clear. PERRL, EOM's intact. Fundi benign.  Ears: normal TM's and external ear canals both ears  Nose: Nares normal. Septum midline. Mucosa normal. No drainage or sinus tenderness.  Lungs: clear to auscultation bilaterally  Heart: regular rate and rhythm, S1, S2 normal, no murmur, click, rub or gallop  Extremities: extremities normal, atraumatic, no cyanosis or edema  Pulses: 2+ and symmetric  Skin: Skin color, texture, turgor normal. No rashes or lesions  Lymph nodes: Cervical, supraclavicular, and axillary nodes normal.  Neurologic: Grossly normal     This note has been dictated using voice recognition software. Any grammatical or context distortions are unintentional and inherent to the software

## 2021-06-26 ENCOUNTER — HEALTH MAINTENANCE LETTER (OUTPATIENT)
Age: 26
End: 2021-06-26

## 2021-06-28 NOTE — PROGRESS NOTES
Progress Notes by Chelsi Delgado MD at 2/22/2020  3:40 PM     Author: Chelsi Delgado MD Service: -- Author Type: Physician    Filed: 2/22/2020  4:54 PM Encounter Date: 2/22/2020 Status: Signed    : Chelsi Delgado MD (Physician)         Subjective:   Mer Gonzalez is a 24 y.o. female  Roomed by:       Chief Complaint   Patient presents with   ? Abdominal Pain     2 days, upper abdomen   Says belly pain started yesterday. Says on 2/14 her OB/GYN doctor prescribed TMP/ SMX for cyst on her vagina. She took her first dose yesterday and started have upper mid belly pain about 2 hours later. She went to bed and woke up to urinate in the middle of the night. She started started feeling the same pain with some nausea. Then this morning she felt fine. Then she ate an hour ago and started feeling the same symptoms. Denies any recent sweats or chills. Appetite has been lower since the pain started. Admits being able to drink fluids and urinate normal amounts. Denies CP or shortness of breath. Denies any recent vomiting or diarrhea. Her grandmother and mother had their gallbladders removed. Says she drank some alcohol last evening.  She does admit a history of GERD, but not recently.    Review of Systems  See HPI for ROS, otherwise balance of other systems negative    Allergies   Allergen Reactions   ? Amoxicillin Hives   ? Latex Hives, Itching, Rash and Swelling       Current Outpatient Medications:   ?  albuterol (PROAIR HFA;PROVENTIL HFA;VENTOLIN HFA) 90 mcg/actuation inhaler, Inhale 2 puffs every 6 (six) hours as needed for wheezing., Disp: 1 Inhaler, Rfl: 3  ?  sulfamethoxazole-trimethoprim (SEPTRA DS) 800-160 mg per tablet, Take 1 tablet by mouth 2 (two) times a day., Disp: , Rfl:   Patient Active Problem List   Diagnosis   ? Migraine without aura and without status migrainosus, not intractable     No past medical history on file. - if none on file, see Problem List    Objective:     Vitals:    02/22/20  1542   BP: 128/86   Patient Site: Right Arm   Patient Position: Sitting   Cuff Size: Adult Large   Pulse: 99   Temp: 97.4  F (36.3  C)   TempSrc: Oral   SpO2: 96%   Weight: 187 lb 3.2 oz (84.9 kg)   Gen - Pt in NAD  Cor - RRR w/o murmur  Lungs - Good air entry, no wheezes or crackles noted  Abdomen: Flat, soft, normal BS, no HSM or masses, no rebound or guarding    Assessment - Plan   Medical Decision Making -24-year-old woman with a family history with a positive family history history for gallbladder disease presents with having had right upper quadrant belly pain several hours after taking 1 dose of Trimeth/Sulfa.  Patient also admitted drinking some alcohol several hours after taking the medication.  She said she was without pain until the middle of the night when she got up to urinate.  Stated this morning that she felt okay until she started to eat and then started having similar pain.  On exam she was afebrile and vital signs are stable.  Her belly exam negative for involuntary guarding or rebound.  Discussed the patient that the other possible causes of her pain could be gastritis versus GERD versus peptic ulcer disease.  Patient was able to get an appointment with her primary on 2/24.  Recommended the patient avoid all alcohol use until the issue with her pain is resolved.  Also asked her to call her gynecologist to discuss if there is an alternative antibiotic that she could take for the problem that she was being treated for.  See patient instructions.    1. Right upper quadrant abdominal pain    At the conclusion of the encounter, assessment and plan were discussed.   All questions were answered.   The patient or guardian acknowledged understanding and was involved in the decision making regarding the overall care plan.    Patient Instructions   1. Keep well hydrated  2. Avoid all alcohol ingestion until your issue with belly pain is resolved  2. May alternate Tylenol every 6 hours with ibuprofen every 6  hours as needed for fever or pain  3. Call the gynecologist and tell them about your pain after taking one dose of the sulfa medication - ask them if they want to order something else or see you  4. If you have any questions, call the clinic number  - it's answered 24/7    Patient Education     Gallstones with Biliary Colic    You have abdominal pain due to irritation and spasm of the gallbladder. This is called biliary colic. The gallbladder is a small sac under the liver, which stores and releases a fluid that aids in the digestion of fat. A collection of crystals may form stones inside the gallbladder (gallstones). Gallstones can cause the gallbladder to spasm. If they block the duct out of the gallbladder, they can cause pain and even an infection.   A number of factors increase the risk for having gallstones:    Being female    Being severely overweight (obese)    Older age    Losing or gaining weight quickly    Eating a high-calorie diet    Being pregnant    Taking hormone therapy    Having diabetes  Home care    Rest in bed.    Drink only clear liquids until you feel better.    You may have been prescribed medicine for pain or nausea. Take these as directed.    Fat in your diet makes the gallbladder contract and may cause increased pain. Avoid foods that are high in fat (such as full-fat dairy, fried foods, and fatty meats) for at least two days.    If you are overweight, talk to your healthcare provider about losing weight.  Follow-up care  Follow up with your healthcare provider or as advised. There is a chance that you will have another episode of pain from your gallstones at some point. Removal of the gallbladder is an option to prevent this. Talk with your healthcare provider about your treatment options.  When to seek medical advice  Call your healthcare provider if any of the following occur:    Worsening pain or pain lasting for longer than 6 hours    Pain moving to the right lower abdomen    Repeated  vomiting    Swollen abdomen    Fever of 100.4 F (38 C) or higher, or as directed by your healthcare provider    Very dark urine, light colored stools, or yellow color of the skin or eyes    Chest, arm, back, neck or jaw pain  Date Last Reviewed: 6/18/2015 2000-2017 [employer]. [address] [place] [address]. All rights reserved. This information is not intended as a substitute for professional medical care. Always follow your healthcare professional's instructions.

## 2021-10-16 ENCOUNTER — HEALTH MAINTENANCE LETTER (OUTPATIENT)
Age: 26
End: 2021-10-16

## 2021-11-24 ENCOUNTER — MYC MEDICAL ADVICE (OUTPATIENT)
Dept: INTERNAL MEDICINE | Facility: CLINIC | Age: 26
End: 2021-11-24
Payer: COMMERCIAL

## 2021-11-24 ENCOUNTER — OFFICE VISIT (OUTPATIENT)
Dept: FAMILY MEDICINE | Facility: CLINIC | Age: 26
End: 2021-11-24
Payer: COMMERCIAL

## 2021-11-24 VITALS
HEART RATE: 79 BPM | OXYGEN SATURATION: 100 % | SYSTOLIC BLOOD PRESSURE: 155 MMHG | RESPIRATION RATE: 15 BRPM | BODY MASS INDEX: 32.92 KG/M2 | TEMPERATURE: 98.2 F | DIASTOLIC BLOOD PRESSURE: 90 MMHG | WEIGHT: 180 LBS

## 2021-11-24 DIAGNOSIS — N89.8 VAGINAL IRRITATION: ICD-10-CM

## 2021-11-24 DIAGNOSIS — B96.89 BV (BACTERIAL VAGINOSIS): Primary | ICD-10-CM

## 2021-11-24 DIAGNOSIS — N76.0 BV (BACTERIAL VAGINOSIS): Primary | ICD-10-CM

## 2021-11-24 LAB
ALBUMIN UR-MCNC: NEGATIVE MG/DL
AMORPH CRY #/AREA URNS HPF: ABNORMAL /HPF
APPEARANCE UR: ABNORMAL
BACTERIA #/AREA URNS HPF: ABNORMAL /HPF
BILIRUB UR QL STRIP: NEGATIVE
CLUE CELLS: PRESENT
COLOR UR AUTO: YELLOW
GLUCOSE UR STRIP-MCNC: NEGATIVE MG/DL
HGB UR QL STRIP: ABNORMAL
KETONES UR STRIP-MCNC: NEGATIVE MG/DL
LEUKOCYTE ESTERASE UR QL STRIP: ABNORMAL
NITRATE UR QL: NEGATIVE
PH UR STRIP: 7 [PH] (ref 5–8)
RBC #/AREA URNS AUTO: ABNORMAL /HPF
SP GR UR STRIP: 1.02 (ref 1–1.03)
SQUAMOUS #/AREA URNS AUTO: ABNORMAL /LPF
TRICHOMONAS, WET PREP: ABNORMAL
UROBILINOGEN UR STRIP-ACNC: 0.2 E.U./DL
WBC #/AREA URNS AUTO: ABNORMAL /HPF
WBC'S/HIGH POWER FIELD, WET PREP: ABNORMAL
YEAST, WET PREP: ABNORMAL

## 2021-11-24 PROCEDURE — 87210 SMEAR WET MOUNT SALINE/INK: CPT | Performed by: NURSE PRACTITIONER

## 2021-11-24 PROCEDURE — 99213 OFFICE O/P EST LOW 20 MIN: CPT | Performed by: NURSE PRACTITIONER

## 2021-11-24 PROCEDURE — 81001 URINALYSIS AUTO W/SCOPE: CPT | Performed by: NURSE PRACTITIONER

## 2021-11-24 RX ORDER — METRONIDAZOLE 500 MG/1
500 TABLET ORAL 2 TIMES DAILY
Qty: 14 TABLET | Refills: 0 | Status: SHIPPED | OUTPATIENT
Start: 2021-11-24 | End: 2021-12-01

## 2021-11-24 NOTE — PROGRESS NOTES
Assessment & Plan     Vaginal irritation    - Wet prep  - UA macro with reflex to Microscopic and Culture - Clinc Collect  - Urine Microscopic    BV (bacterial vaginosis)    - metroNIDAZOLE (FLAGYL) 500 MG tablet  Dispense: 14 tablet; Refill: 0     Recheck in 3 to 5 days if not any better.          No follow-ups on file.    Thuy Lubin, CNP  [facility] ARSEN Del Angel is a 26 year old female who presents to clinic today for the following health issues:  Chief Complaint   Patient presents with     Urinary Problem     frequency      HPI    Urinary frequency with burning and a fluttering feeling in the end of her urethra.  Urine is negative today affection, but BV swab is positive.  Patient has had this before.    Okay with metronidazole orally.      Review of Systems  See HPI      Objective    BP (!) 155/90   Pulse 79   Temp 98.2  F (36.8  C)   Resp 15   Wt 81.6 kg (180 lb)   LMP 11/05/2021   SpO2 100%   Breastfeeding No   BMI 32.92 kg/m    Physical Exam  Constitutional:       General: She is not in acute distress.     Appearance: She is well-developed.   Eyes:      General:         Right eye: No discharge.         Left eye: No discharge.      Conjunctiva/sclera: Conjunctivae normal.   Pulmonary:      Effort: Pulmonary effort is normal.   Musculoskeletal:         General: Normal range of motion.   Skin:     General: Skin is warm and dry.      Capillary Refill: Capillary refill takes less than 2 seconds.   Neurological:      Mental Status: She is alert and oriented to person, place, and time.   Psychiatric:         Mood and Affect: Mood normal.         Behavior: Behavior normal.         Thought Content: Thought content normal.         Judgment: Judgment normal.            Results for orders placed or performed in visit on 11/24/21 (from the past 24 hour(s))   Wet prep    Specimen: Vagina; Swab   Result Value Ref Range    Trichomonas Absent Absent    Yeast Absent  Absent    Clue Cells Present (A) Absent    WBCs/high power field 2+ (A) None   UA macro with reflex to Microscopic and Culture - Clinc Collect    Specimen: Urine, Clean Catch   Result Value Ref Range    Color Urine Yellow Colorless, Straw, Light Yellow, Yellow    Appearance Urine Cloudy (A) Clear    Glucose Urine Negative Negative mg/dL    Bilirubin Urine Negative Negative    Ketones Urine Negative Negative mg/dL    Specific Gravity Urine 1.020 1.005 - 1.030    Blood Urine Trace (A) Negative    pH Urine 7.0 5.0 - 8.0    Protein Albumin Urine Negative Negative mg/dL    Urobilinogen Urine 0.2 0.2, 1.0 E.U./dL    Nitrite Urine Negative Negative    Leukocyte Esterase Urine Small (A) Negative   Urine Microscopic   Result Value Ref Range    Bacteria Urine Few (A) None Seen /HPF    RBC Urine None Seen 0-2 /HPF /HPF    WBC Urine 0-5 0-5 /HPF /HPF    Squamous Epithelials Urine Few (A) None Seen /LPF    Amorphous Crystals Urine Moderate (A) None Seen /HPF    Narrative    Urine Culture not indicated

## 2021-11-24 NOTE — PATIENT INSTRUCTIONS
Recheck in 3 to 5 days if not any better.      Patient Education     Bacterial Vaginosis    You have a vaginal infection called bacterial vaginosis (BV). Both good and bad bacteria are present in a healthy vagina. BV occurs when these bacteria get out of balance. The number of bad bacteria increase. And the number of good bacteria decrease. BV is linked with sexual activity, but it's not a sexually transmitted infection (STI).   BV may or may not cause symptoms. If symptoms do occur, they can include:     Thin, gray, milky-white, or sometimes green discharge    Unpleasant odor or  fishy  smell    Itching, burning, or pain in or around the vagina  It is not known what causes BV, but certain factors can make the problem more likely. These can include:     Douching    Spermicides    Use of antibiotics    Change in hormone levels with pregnancy, breastfeeding, or menopause    Having sex with a new partner    Having sex with more than one partner  BV will sometimes go away on its own. But treatment is often advised. This is because untreated BV can raise the risk of more serious health problems such as:     Pelvic inflammatory disease (PID)     delivery (giving birth to a baby early if you re pregnant)    HIV and some other sexually transmitted infections (STIs)    Infection after surgery on the reproductive organs  Home care  General care    BV is most often treated with medicines called antibiotics. These may be given as pills or as a vaginal cream. If antibiotics are prescribed, be sure to use them exactly as directed. And complete all of the medicine, even if your symptoms go away.    Don't douche or having sex during treatment.    If you have sex with a female partner, ask your healthcare provider if she should also be treated.  Prevention    Don't douche.    Don't have sex. If you do have sex, then take steps to lower your risk:  ? Use condoms when having sex.  ? Limit the number of sex partners you  have.    Follow-up care  Follow up with your healthcare provider, or as advised.   When to get medical advice  Call your healthcare provider right away if:     You have a fever of 100.4 F (38 C) or higher, or as directed by your provider.    Your symptoms get worse, or they don t go away within a few days of starting treatment.    You have new pain in the lower belly or pelvic region.    You have side effects that bother you or a reaction to the pills or cream you re prescribed.    You or any of your sex partners have new symptoms, such as a rash, joint pain, or sores.  [employer] last reviewed this educational content on 6/1/2020 2000-2021 [employer]. All rights reserved. This information is not intended as a substitute for professional medical care. Always follow your healthcare professional's instructions.

## 2021-11-30 NOTE — PROGRESS NOTES
Progress Notes by Lesly Mauro CNP at 9/28/2020  4:30 PM     Author: Lesly Mauro CNP Service: -- Author Type: Nurse Practitioner    Filed: 9/28/2020  5:16 PM Encounter Date: 9/28/2020 Status: Signed    : Lesly Mauro CNP (Nurse Practitioner)       FEMALE PREVENTATIVE EXAM    Assessment and Plan:     Patient has been advised of split billing requirements and indicates understanding: Yes    1. Annual physical exam: Annual exam completed today. She declined a flu shot. HPV vaccine series was completed in 2011,updated this. Normal PAP in 2019.    2. Encounter to establish care: Care established today.     3. Vaginal discharge: [place] with a new partner approximately 1 month ago.  She reports a change in vaginal smell.  She has had her period, last menstrual period was on 8/26/2020.  Periods are regular.  Will check chlamydia gonorrhea, wet prep, syphilis, HIV today.  Discussed abstaining from intercourse until all values are back.  - Chlamydia trachomatis & Neisseria gonorrhoeae, Amplified Detection  - Wet Prep, Vaginal  - Syphilis Screen, Cascade  - HIV Antigen/Antibody Screening Cascade  -No intercourse until all results are back.  -Follow-up with provider if you would like to start birth control.    4. Gastroesophageal reflux disease without esophagitis: History of reflux, this is subsided.  She is using omeprazole only as needed at this time.  Stable.    Next follow up:  Return in about 1 year (around 9/28/2021) for Routine preventive.    Immunization Review  Adult Imm Review: Declines immunizations today    I discussed the following with the patient:   Adult Healthy Living: Importance of regular exercise  Healthy nutrition      Subjective:   Chief Complaint: Mer Gonzalez is an 25 y.o. female here for a preventative health visit.    Patient has been advised of split billing requirements and indicates understanding: Yes  HPI: She presents today to establish care  and for her annual physical.    She reports previously being a patient of Dr. Eagle Gayle.  She would like to have a female provider.    Her last Pap smear was on 8/23/2019 was normal.    It is noted she did finish all 3 of the HPV series vaccines.  Health maintenance updated of this.    She reports recently having intercourse about 1 month ago with a new partner, she is condoms for protection.  She reports since then having a different vaginal smell.  She denies any discharge or itching, but would like an STI work-up today.    She reports her last menstrual period was on 8/26/2020.  Her cycles come monthly.  She reports being interested in the birth control patch.  She does have a latex allergy, has tried the pill in the past and got very emotional on it, and is not interested in an IUD or Nexplanon.  She reports also not being interested in a shot.  She has not had intercourse since this past partner, if she decides to have a more steady relationship and is sexually active she will discuss birth control options with the provider at that time.    She currently lives with her mother, she is single.  She reports she works in .  She reports hiking and walking 2 to 3 days a week for exercise.    She denies any drug, alcohol, or tobacco use.    She reports a past medical history of a sulfa drug allergy.  She reports a history of sebaceous cyst in her sneha-area at times.    She reports her mom has no chronic disease.  Her father passed away of kidney cancer at age 48.    She reports a previous tonsillectomy 2009, wisdom teeth removal in 2011, and at age 7 had a cyst removed from her mouth.    She denies fever, chills, cough, shortness of breath, chest pain, chest pressure, nausea, vomiting, abdominal pain, urinary, or bowel symptoms today.    Healthy Habits  Are you taking a daily aspirin? No  Do you typically exercising at least 40 min, 3-4 times per week?  Yes  Do you usually eat at least 4  "servings of fruit and vegetables a day, include whole grains and fiber and avoid regularly eating high fat foods? NO  Have you had an eye exam in the past two years? NO  Do you see a dentist twice per year? Yes  Do you have any concerns regarding sleep? No    Safety Screen  If you own firearms, are they secured in a locked gun cabinet or with trigger locks? The patient does not own any firearms  Do you feel you are safe where you are living?: Yes (9/28/2020  4:07 PM)  Do you feel you are safe in your relationship(s)?: Yes (9/28/2020  4:07 PM)      Review of Systems:  Please see above.  The rest of the review of systems are negative for all systems.     Pap History:   No - age 21-29 PAP every 3 years recommended  Cancer Screening       Status Date      PAP SMEAR Next Due 8/23/2022      Done 8/23/2019 GYNECOLOGIC CYTOLOGY (PAP SMEAR)          Patient Care Team:  Lesly Mauro CNP as PCP - General (Nurse Practitioner)  Wendy Gayle MD as Assigned PCP        History     Reviewed By Date/Time Sections Reviewed    Lesly Mauro CNP 9/28/2020  5:12 PM Medical, Surgical, Tobacco, Alcohol, Drug Use, Sexual Activity, Family    Bailey Mayorga MA 9/28/2020  4:07 PM Tobacco            Objective:   Vital Signs:   Visit Vitals  /88   Pulse 66   Ht 5' 2.5\" (1.588 m)   Wt 173 lb 9.6 oz (78.7 kg)   BMI 31.25 kg/m         Physical Exam   Constitutional: She is oriented to person, place, and time and well-developed, well-nourished, and in no distress. No distress.   HENT:   Head: Normocephalic and atraumatic.   Right Ear: External ear normal.   Left Ear: External ear normal.   Nose: Nose normal.   Mouth/Throat: Oropharynx is clear and moist. No oropharyngeal exudate.   Eyes: Pupils are equal, round, and reactive to light. Conjunctivae and EOM are normal. No scleral icterus.   Neck: Normal range of motion. Neck supple. No thyromegaly present.   Cardiovascular: Normal rate, regular rhythm and " normal heart sounds. Exam reveals no gallop and no friction rub.   No murmur heard.  Pulmonary/Chest: Effort normal and breath sounds normal.   Abdominal: Soft. Bowel sounds are normal. She exhibits no distension and no mass. There is no abdominal tenderness. There is no rebound and no guarding.   Genitourinary:    Vulva, cervix, uterus, right adnexa, left adnexa and rectum normal.      Vaginal discharge and exudate present.      Creamy  odorless and white found.      Genitourinary Comments: Scant white egg white creamy discharge noted on exam. No foul odor.      Musculoskeletal: Normal range of motion.         General: No tenderness or edema.   Lymphadenopathy:     She has no cervical adenopathy.   Neurological: She is alert and oriented to person, place, and time. Gait normal.   Skin: Skin is warm and dry. She is not diaphoretic.   Psychiatric: Mood, memory, affect and judgment normal.   Nursing note and vitals reviewed.             Medication List          Accurate as of September 28, 2020  5:14 PM. If you have any questions, ask your nurse or doctor.            CONTINUE taking these medications    albuterol 90 mcg/actuation inhaler  Also known as:  PROAIR HFA;PROVENTIL HFA;VENTOLIN HFA  INSTRUCTIONS:  Inhale 2 puffs every 6 (six) hours as needed for wheezing.  Doctor's comments:  May substitute the equivalent medication per insurance preference.        omeprazole 20 MG capsule  Also known as:  PriLOSEC  INSTRUCTIONS:  Take 1 capsule (20 mg total) by mouth 2 (two) times a day before meals.           STOP taking these medications    sulfamethoxazole-trimethoprim 800-160 mg per tablet  Also known as:  SEPTRA DS  Stopped by:  Lesly Mauro CNP            Additional Screenings Completed Today: STI screening.           ED Record Reviewed

## 2021-12-11 ENCOUNTER — HEALTH MAINTENANCE LETTER (OUTPATIENT)
Age: 26
End: 2021-12-11

## 2021-12-28 ENCOUNTER — OFFICE VISIT (OUTPATIENT)
Dept: FAMILY MEDICINE | Facility: CLINIC | Age: 26
End: 2021-12-28
Payer: COMMERCIAL

## 2021-12-28 VITALS
SYSTOLIC BLOOD PRESSURE: 138 MMHG | OXYGEN SATURATION: 98 % | BODY MASS INDEX: 33.31 KG/M2 | TEMPERATURE: 98.4 F | HEART RATE: 81 BPM | WEIGHT: 181 LBS | DIASTOLIC BLOOD PRESSURE: 88 MMHG | HEIGHT: 62 IN

## 2021-12-28 DIAGNOSIS — N89.8 VAGINAL DISCHARGE: ICD-10-CM

## 2021-12-28 DIAGNOSIS — R39.9 UTI SYMPTOMS: Primary | ICD-10-CM

## 2021-12-28 DIAGNOSIS — B37.31 YEAST INFECTION OF THE VAGINA: ICD-10-CM

## 2021-12-28 LAB
ALBUMIN UR-MCNC: NEGATIVE MG/DL
APPEARANCE UR: CLEAR
BILIRUB UR QL STRIP: NEGATIVE
CLUE CELLS: ABNORMAL
COLOR UR AUTO: YELLOW
GLUCOSE UR STRIP-MCNC: NEGATIVE MG/DL
HGB UR QL STRIP: ABNORMAL
KETONES UR STRIP-MCNC: NEGATIVE MG/DL
LEUKOCYTE ESTERASE UR QL STRIP: ABNORMAL
NITRATE UR QL: NEGATIVE
PH UR STRIP: 6.5 [PH] (ref 5–8)
SP GR UR STRIP: 1.02 (ref 1–1.03)
TRICHOMONAS, WET PREP: ABNORMAL
UROBILINOGEN UR STRIP-ACNC: 0.2 E.U./DL
WBC'S/HIGH POWER FIELD, WET PREP: ABNORMAL
YEAST, WET PREP: PRESENT

## 2021-12-28 PROCEDURE — 87210 SMEAR WET MOUNT SALINE/INK: CPT | Performed by: NURSE PRACTITIONER

## 2021-12-28 PROCEDURE — 81003 URINALYSIS AUTO W/O SCOPE: CPT | Performed by: NURSE PRACTITIONER

## 2021-12-28 PROCEDURE — 87086 URINE CULTURE/COLONY COUNT: CPT | Performed by: NURSE PRACTITIONER

## 2021-12-28 PROCEDURE — 99214 OFFICE O/P EST MOD 30 MIN: CPT | Performed by: NURSE PRACTITIONER

## 2021-12-28 RX ORDER — NITROFURANTOIN 25; 75 MG/1; MG/1
CAPSULE ORAL
COMMUNITY
Start: 2021-12-17 | End: 2021-12-28

## 2021-12-28 RX ORDER — METRONIDAZOLE 500 MG/1
TABLET ORAL
COMMUNITY
Start: 2021-12-17 | End: 2021-12-28

## 2021-12-28 RX ORDER — FLUCONAZOLE 150 MG/1
150 TABLET ORAL ONCE
Qty: 2 TABLET | Refills: 0 | Status: SHIPPED | OUTPATIENT
Start: 2021-12-28 | End: 2021-12-28

## 2021-12-28 RX ORDER — VALACYCLOVIR HYDROCHLORIDE 500 MG/1
TABLET, FILM COATED ORAL
COMMUNITY
Start: 2021-04-22 | End: 2022-08-29

## 2021-12-28 RX ORDER — ALBUTEROL SULFATE 90 UG/1
2 AEROSOL, METERED RESPIRATORY (INHALATION)
COMMUNITY
Start: 2020-10-23 | End: 2022-04-27

## 2021-12-28 ASSESSMENT — MIFFLIN-ST. JEOR: SCORE: 1506.32

## 2021-12-28 NOTE — PATIENT INSTRUCTIONS
Urine shows the same abnormalities as last time, but we will get a culture this time to confirm whether or not we are dealing with UTI    We will also get back to you with the wet prep results later today.

## 2021-12-28 NOTE — PROGRESS NOTES
"Chief Complaint   Patient presents with     Frequency     Symptoms have been going on and off for the past month. A little blood with wiping. Has been treated twice already and keeps coming back.      Urinary Pain     Vaginal Problem     White chunky to clear slimey.        HPI: Patient presents today with genitourinary concerns.  Initially she was having some urinary frequency and discomfort and so a urinalysis was performed which showed some leukocytes and blood.  She also was having vaginal discharge and wet prep showed clue cells.  She was given a prescription for metronidazole for a week and nitrofurantoin.  Symptoms seem to improve but then patient started to get vaginal discharge/discomfort issues again and was given a 2000 mg dose of metronidazole.  Still having symptoms.  Discharge at times is white.  Patient is in a committed monogamous relationship and denies any STD risk.  She just started over-the-counter cranberry supplements.  No culture of the urine was performed previously.    ROS:Review of Systems - negative except for what's listed in the HPI    SH: The Patient's  reports that she has never smoked. She has never used smokeless tobacco. She reports previous alcohol use. She reports that she does not use drugs.      FH: The Patient's family history includes Allergic rhinitis in her sister; Kidney Cancer in her father.     Meds:    Current Outpatient Medications   Medication     albuterol (PROAIR HFA/PROVENTIL HFA/VENTOLIN HFA) 108 (90 Base) MCG/ACT inhaler     valACYclovir (VALTREX) 500 MG tablet     No current facility-administered medications for this visit.       O:  /88   Pulse 81   Temp 98.4  F (36.9  C) (Oral)   Ht 1.562 m (5' 1.5\")   Wt 82.1 kg (181 lb)   LMP 12/06/2021 (Exact Date)   SpO2 98%   BMI 33.65 kg/m      Physical Examination:   General appearance - alert, well appearing, and in no distress  Neck - no significant adenopathy  Lymphatics - no palpable " lymphadenopathy  Chest - clear to auscultation, no wheezes, rales or rhonchi, symmetric air entry  Heart - normal rate and regular rhythm, S1 and S2 normal, no murmurs noted  Abdomen - soft, nontender, nondistended, no masses or organomegaly  Genitourinary-no external lesions.  Wet prep collected with chaperone in room.  Thin white discharge noted.    A/P:       ICD-10-CM    1. UTI symptoms  R39.9 UA reflex to Microscopic - lab collect     Urinalysis Macroscopic - lab collect     Urine Culture Aerobic Bacterial - lab collect     Urinalysis Macroscopic - lab collect     Urine Culture Aerobic Bacterial - lab collect   2. Vaginal discharge  N89.8 Wet prep - Clinic Collect   3. Yeast infection of the vagina  B37.3 fluconazole (DIFLUCAN) 150 MG tablet     Wet prep shows yeast.  Suspect Macrobid caused yeast infection and she was not having recurrent BV.  Fluconazole sent to the pharmacy.  Urinalysis ordered, but will hold off on treatment until urine culture comes back this time.    Reviewed outside office visit note x1, urinalysis macro x1, urinalysis micro x1    UTI symptoms  - UA reflex to Microscopic - lab collect  - Urinalysis Macroscopic - lab collect  - Urine Culture Aerobic Bacterial - lab collect  - Urinalysis Macroscopic - lab collect  - Urine Culture Aerobic Bacterial - lab collect    Vaginal discharge  - Wet prep - Clinic Collect    Yeast infection of the vagina  - fluconazole (DIFLUCAN) 150 MG tablet  Dispense: 2 tablet; Refill: 0        Paolo Spear CNP      This note has been dictated using voice recognition software. Any grammatical or context distortions are unintentional and inherent to the software.

## 2021-12-29 LAB — BACTERIA UR CULT: NORMAL

## 2022-01-01 ENCOUNTER — HOSPITAL ENCOUNTER (EMERGENCY)
Facility: CLINIC | Age: 27
Discharge: HOME | End: 2022-01-01
Admitting: PHYSICIAN ASSISTANT
Payer: COMMERCIAL

## 2022-01-01 VITALS
OXYGEN SATURATION: 99 % | RESPIRATION RATE: 20 BRPM | WEIGHT: 181 LBS | HEART RATE: 91 BPM | TEMPERATURE: 97.7 F | HEIGHT: 61 IN | BODY MASS INDEX: 34.17 KG/M2 | SYSTOLIC BLOOD PRESSURE: 169 MMHG | DIASTOLIC BLOOD PRESSURE: 101 MMHG

## 2022-01-01 DIAGNOSIS — N39.0 URINARY TRACT INFECTION: ICD-10-CM

## 2022-01-01 LAB
ALBUMIN UR-MCNC: NEGATIVE MG/DL
APPEARANCE UR: ABNORMAL
BACTERIA #/AREA URNS HPF: ABNORMAL /HPF
BILIRUB UR QL STRIP: NEGATIVE
CLUE CELLS: ABNORMAL
COLOR UR AUTO: COLORLESS
GLUCOSE UR STRIP-MCNC: NEGATIVE MG/DL
HCG UR QL: NEGATIVE
HGB UR QL STRIP: ABNORMAL
KETONES UR STRIP-MCNC: ABNORMAL MG/DL
LEUKOCYTE ESTERASE UR QL STRIP: ABNORMAL
MUCOUS THREADS #/AREA URNS LPF: PRESENT /LPF
NITRATE UR QL: NEGATIVE
PH UR STRIP: 6 [PH] (ref 5–7)
RBC URINE: 2 /HPF
SP GR UR STRIP: 1 (ref 1–1.03)
SQUAMOUS EPITHELIAL: 2 /HPF
TRICHOMONAS, WET PREP: ABNORMAL
UROBILINOGEN UR STRIP-MCNC: <2 MG/DL
WBC URINE: 91 /HPF
WBC'S/HIGH POWER FIELD, WET PREP: ABNORMAL
YEAST, WET PREP: ABNORMAL

## 2022-01-01 PROCEDURE — 81001 URINALYSIS AUTO W/SCOPE: CPT | Performed by: PHYSICIAN ASSISTANT

## 2022-01-01 PROCEDURE — 81025 URINE PREGNANCY TEST: CPT | Performed by: PHYSICIAN ASSISTANT

## 2022-01-01 PROCEDURE — 99284 EMERGENCY DEPT VISIT MOD MDM: CPT

## 2022-01-01 PROCEDURE — 87491 CHLMYD TRACH DNA AMP PROBE: CPT | Performed by: PHYSICIAN ASSISTANT

## 2022-01-01 PROCEDURE — 87086 URINE CULTURE/COLONY COUNT: CPT | Performed by: PHYSICIAN ASSISTANT

## 2022-01-01 PROCEDURE — 87210 SMEAR WET MOUNT SALINE/INK: CPT | Performed by: PHYSICIAN ASSISTANT

## 2022-01-01 RX ORDER — CEPHALEXIN 500 MG/1
500 CAPSULE ORAL 4 TIMES DAILY
Qty: 28 CAPSULE | Refills: 0 | Status: SHIPPED | OUTPATIENT
Start: 2022-01-01 | End: 2022-01-08

## 2022-01-01 ASSESSMENT — ENCOUNTER SYMPTOMS
VOMITING: 0
NUMBNESS: 0
ABDOMINAL PAIN: 1
FEVER: 0
CHILLS: 0
BACK PAIN: 0
DYSURIA: 1

## 2022-01-01 ASSESSMENT — MIFFLIN-ST. JEOR: SCORE: 1490.45

## 2022-01-01 NOTE — ED PROVIDER NOTES
EMERGENCY DEPARTMENT ENCOUNTER      NAME: Mer Gonzalez  AGE: 26 year old female  YOB: 1995  MRN: [id number]  EVALUATION DATE & TIME: 1/1/2022  5:30 PM    PCP: Lesly Mauro    ED PROVIDER: Baron Lincoln PA-C      Chief Complaint   Patient presents with     Dysuria         FINAL IMPRESSION:  1. Urinary tract infection          MEDICAL DECISION MAKING:    Pertinent Labs & Imaging studies reviewed. (See chart for details)  26 year old female presents to the Emergency Department for evaluation of dysuria.    After obtaining history present illness, reviewing vitals and reviewing previous medical records decided to assess with swabs for wet prep, GC chlamydia, as well as urinalysis.    Wet prep is negative.  Urinalysis is nitrate negative but does show high white cells.  At this point time plan to treat with a 1 week course of Keflex.  We will follow urine culture results.  We will also call the patient with gonorrhea chlamydia testing.  Low suspicion that this is the case based on the fact that the patient has no vaginal discharge, she is in a monogamous relationship and relationship, no new sexual partners reported.  Overall patient agreeable with this plan of care.        ED COURSE    I met with the patient, obtained history, performed an initial exam, and discussed options and plan for diagnostics and treatment here in the ED.    At the conclusion of the encounter I discussed the results of all of the tests and the disposition. The questions were answered. The patient or family acknowledged understanding and was agreeable with the care plan.     MEDICATIONS GIVEN IN THE EMERGENCY:  Medications - No data to display    NEW PRESCRIPTIONS STARTED AT TODAY'S ER VISIT  New Prescriptions    CEPHALEXIN (KEFLEX) 500 MG CAPSULE    Take 1 capsule (500 mg) by mouth 4 times daily for 7 days            =================================================================    HPI    Patient information was  "obtained from: Patient    Use of Interpretor: N/A       Mer Gonzalez is a 26 year old female with a pertinent history of UTIs and herpes who presents to this ED via walk-in for evaluation of dysuria.    Patient reports having urinary issues since Thanksgiving, she [facility]21, was diagnosed with BV and prescribed metronidazole. She reports her symptoms went away for two weeks, then came back, so she did a virtual visit through \"[facility],\" was prescribed an antibiotic for a UTI and BV. Patient was then seen at the [facility] in [place] on 12/28/2021 (3 days ago) for UTI symptoms and a yeast infection. She was prescribed fluconazole. Patient presents today with dysuria and abdominal pain. She denies vaginal discharge today, numbness, fevers, chills, back pain, or vomiting. Last menstrual cycle was December 6th. She denies a history of kidney stones. Endorses a history of UTIs and herpes. Patient denies any other current complaints.    REVIEW OF SYSTEMS   Review of Systems   Constitutional: Negative for chills and fever.   Gastrointestinal: Positive for abdominal pain. Negative for vomiting.   Genitourinary: Positive for dysuria. Negative for vaginal discharge.   Musculoskeletal: Negative for back pain.   Neurological: Negative for numbness.   All other systems reviewed and are negative.         PAST MEDICAL HISTORY:  No past medical history on file.    PAST SURGICAL HISTORY:  Past Surgical History:   Procedure Laterality Date     TONSILLECTOMY       WISDOM TOOTH EXTRACTION  2011         CURRENT MEDICATIONS:    No current facility-administered medications for this encounter.    Current Outpatient Medications:      cephALEXin (KEFLEX) 500 MG capsule, Take 1 capsule (500 mg) by mouth 4 times daily for 7 days, Disp: 28 capsule, Rfl: 0     albuterol (PROAIR HFA/PROVENTIL HFA/VENTOLIN HFA) 108 (90 Base) MCG/ACT inhaler, Inhale 2 puffs into the lungs (Patient not taking: " "Reported on 12/28/2021), Disp: , Rfl:      valACYclovir (VALTREX) 500 MG tablet, take 1 tablet 2 times daily Orally for  3 days (Patient not taking: Reported on 12/28/2021), Disp: , Rfl:       ALLERGIES:  Allergies   Allergen Reactions     Amoxicillin Hives     Sulfamethoxazole-Trimethoprim [Sulfamethoxazole W/Trimethoprim] Unknown     Latex Hives, Itching, Rash and Swelling       FAMILY HISTORY:  Family History   Problem Relation Age of Onset     Kidney Cancer Father         age 48     Allergic rhinitis Sister        SOCIAL HISTORY:   Social History     Socioeconomic History     Marital status: Single     Spouse name: Not on file     Number of children: Not on file     Years of education: Not on file     Highest education level: Not on file   Occupational History     Not on file   Tobacco Use     Smoking status: Never Smoker     Smokeless tobacco: Never Used   Substance and Sexual Activity     Alcohol use: Not Currently     Drug use: Never     Sexual activity: Not Currently     Partners: Male     Birth control/protection: Condom   Other Topics Concern     Not on file   Social History Narrative     Not on file     Social Determinants of Health     Financial Resource Strain: Not on file   Food Insecurity: Not on file   Transportation Needs: Not on file   Physical Activity: Not on file   Stress: Not on file   Social Connections: Not on file   Intimate Partner Violence: Not on file   Housing Stability: Not on file       VITALS:  Patient Vitals for the past 24 hrs:   BP Temp Temp src Pulse Resp SpO2 Height Weight   01/01/22 1726 (!) 169/101 97.7  F (36.5  C) Oral 91 20 99 % 1.537 m (5' 0.5\") 82.1 kg (181 lb)       PHYSICAL EXAM    Physical Exam  Vitals and nursing note reviewed.   Constitutional:       General: She is not in acute distress.     Appearance: Normal appearance. She is normal weight. She is not ill-appearing or toxic-appearing.   HENT:      Head: Normocephalic.      Right Ear: External ear normal.      Left " Ear: External ear normal.      Nose: Nose normal.   Eyes:      Conjunctiva/sclera: Conjunctivae normal.   Cardiovascular:      Pulses: Normal pulses.   Pulmonary:      Effort: Pulmonary effort is normal.   Abdominal:      General: Abdomen is flat.      Tenderness: There is abdominal tenderness. There is no right CVA tenderness, left CVA tenderness, guarding or rebound.      Comments: Mild reproducible suprapubic abdominal tenderness.   Musculoskeletal:         General: No tenderness or deformity. Normal range of motion.   Skin:     General: Skin is warm.      Findings: No bruising or rash.   Neurological:      General: No focal deficit present.      Mental Status: She is alert.      Sensory: No sensory deficit.      Motor: No weakness.   Psychiatric:         Mood and Affect: Mood normal.          LAB:  All pertinent labs reviewed and interpreted.  Results for orders placed or performed during the hospital encounter of 01/01/22   UA with Microscopic reflex to Culture    Specimen: Urine, Midstream   Result Value Ref Range    Color Urine Colorless Colorless, Straw, Light Yellow, Yellow    Appearance Urine Turbid (A) Clear    Glucose Urine Negative Negative mg/dL    Bilirubin Urine Negative Negative    Ketones Urine Trace (A) Negative mg/dL    Specific Gravity Urine 1.004 1.001 - 1.030    Blood Urine >1.0 mg/dL (A) Negative    pH Urine 6.0 5.0 - 7.0    Protein Albumin Urine Negative Negative mg/dL    Urobilinogen Urine <2.0 <2.0 mg/dL    Nitrite Urine Negative Negative    Leukocyte Esterase Urine 500 Devendra/uL (A) Negative    Bacteria Urine Few (A) None Seen /HPF    Mucus Urine Present (A) None Seen /LPF    RBC Urine 2 <=2 /HPF    WBC Urine 91 (H) <=5 /HPF    Squamous Epithelials Urine 2 (H) <=1 /HPF   HCG qualitative urine   Result Value Ref Range    hCG Urine Qualitative Negative Negative   Wet prep    Specimen: Vagina; Swab   Result Value Ref Range    Trichomonas Absent Absent    Yeast Absent Absent    Clue Cells Absent  Absent    WBCs/high power field 2+ (A) None       RADIOLOGY:  Reviewed all pertinent imaging. Please see official radiology report.  No orders to display           I, Helena Hough, am serving as a scribe to document services personally performed by Baron Lincoln PA-C based on my observation and the provider's statements to me. IBaron PA-C attest that Helena Hough is acting in a scribe capacity, has observed my performance of the services and has documented them in accordance with my direction.    Baron Lincoln PA-C  Emergency Medicine  [facility]     Baron Lincoln PA-C  01/01/22 0481

## 2022-01-01 NOTE — ED TRIAGE NOTES
"Arrives to ED with c/o burning with urination. Treated for UTI and BV x2 weeks ago. Dx with yeast infection on Wednesday. Yesterday developed burning and \"pulsating\" with urination. Afebrile. Denies back pain.   "

## 2022-01-02 LAB
BACTERIA UR CULT: NORMAL
C TRACH DNA SPEC QL PROBE+SIG AMP: NEGATIVE
N GONORRHOEA DNA SPEC QL NAA+PROBE: NEGATIVE

## 2022-01-02 NOTE — DISCHARGE INSTRUCTIONS
Please take the antibiotic as written.  Follow-up through primary care if there is persistent symptoms or return to the ED if you have worsening symptoms.

## 2022-01-04 ENCOUNTER — OFFICE VISIT (OUTPATIENT)
Dept: OBGYN | Facility: CLINIC | Age: 27
End: 2022-01-04
Payer: COMMERCIAL

## 2022-01-04 ENCOUNTER — NURSE TRIAGE (OUTPATIENT)
Dept: NURSING | Facility: CLINIC | Age: 27
End: 2022-01-04
Payer: COMMERCIAL

## 2022-01-04 VITALS — DIASTOLIC BLOOD PRESSURE: 80 MMHG | WEIGHT: 179 LBS | BODY MASS INDEX: 34.38 KG/M2 | SYSTOLIC BLOOD PRESSURE: 132 MMHG

## 2022-01-04 DIAGNOSIS — R30.9 PAIN WITH URINATION: Primary | ICD-10-CM

## 2022-01-04 DIAGNOSIS — N89.8 VAGINAL IRRITATION: ICD-10-CM

## 2022-01-04 LAB
ALBUMIN UR-MCNC: 100 MG/DL
APPEARANCE UR: ABNORMAL
BACTERIA #/AREA URNS HPF: ABNORMAL /HPF
BILIRUB UR QL STRIP: NEGATIVE
CLUE CELLS: ABNORMAL
COLOR UR AUTO: YELLOW
GLUCOSE UR STRIP-MCNC: NEGATIVE MG/DL
HGB UR QL STRIP: ABNORMAL
KETONES UR STRIP-MCNC: NEGATIVE MG/DL
LEUKOCYTE ESTERASE UR QL STRIP: ABNORMAL
NITRATE UR QL: NEGATIVE
PH UR STRIP: 6.5 [PH] (ref 5–7)
RBC #/AREA URNS AUTO: ABNORMAL /HPF
SP GR UR STRIP: 1.01 (ref 1–1.03)
SQUAMOUS #/AREA URNS AUTO: ABNORMAL /LPF
TRICHOMONAS, WET PREP: ABNORMAL
UROBILINOGEN UR STRIP-ACNC: 0.2 E.U./DL
WBC #/AREA URNS AUTO: ABNORMAL /HPF
WBC'S/HIGH POWER FIELD, WET PREP: ABNORMAL
YEAST, WET PREP: ABNORMAL

## 2022-01-04 PROCEDURE — 87210 SMEAR WET MOUNT SALINE/INK: CPT | Performed by: OBSTETRICS & GYNECOLOGY

## 2022-01-04 PROCEDURE — 87491 CHLMYD TRACH DNA AMP PROBE: CPT | Performed by: OBSTETRICS & GYNECOLOGY

## 2022-01-04 PROCEDURE — 87591 N.GONORRHOEAE DNA AMP PROB: CPT | Performed by: OBSTETRICS & GYNECOLOGY

## 2022-01-04 PROCEDURE — 99203 OFFICE O/P NEW LOW 30 MIN: CPT | Performed by: OBSTETRICS & GYNECOLOGY

## 2022-01-04 PROCEDURE — 81001 URINALYSIS AUTO W/SCOPE: CPT | Performed by: OBSTETRICS & GYNECOLOGY

## 2022-01-04 PROCEDURE — 87086 URINE CULTURE/COLONY COUNT: CPT | Performed by: OBSTETRICS & GYNECOLOGY

## 2022-01-04 NOTE — TELEPHONE ENCOUNTER
Urinary and vaginal issues.    Last weekend bad and went to ER. UTI.    Taking keflex for 3 days and stopped due to not feeling well.    Has amox allergy. Vaginal yeast symptoms are returning and [facility]    Belen Beckham RN  [facility] Nurse Advisor            Reason for Disposition    Patient wants to be seen    Additional Information    Negative: Severe difficulty breathing (e.g., struggling for each breath, speaks in single words)    Negative: Sounds like a life-threatening emergency to the triager    Negative: Sinus infection and taking an antibiotic    Negative: Wound infection and taking an antibiotic    Negative: MODERATE difficulty breathing (e.g., speaks in phrases, SOB even at rest, pulse 100-120)    Negative: Fever > 103 F (39.4 C)    Negative: Patient sounds very sick or weak to the triager    Negative: Finished taking antibiotics and symptoms are BETTER but are not completely gone    Protocols used: INFECTION ON ANTIBIOTIC FOLLOW-UP CALL-A-OH

## 2022-01-04 NOTE — PATIENT INSTRUCTIONS
You can reach your [facility] Care Team any time of the day by calling [phone]. This number will put you in touch with the 24 hour nurse line if the clinic is closed.    To contact your OB/GYN Station Coordinator/Surgery Scheduler please call [phone]. This is a direct number for your care team between 8 a.m. and 4 p.m. Monday through Friday.    [facility] is open for your convenience:  Monday through Friday 8 a.m. to 6 p.m.  Closed weekends and all major holidays.

## 2022-01-05 LAB
C TRACH DNA SPEC QL NAA+PROBE: NEGATIVE
N GONORRHOEA DNA SPEC QL NAA+PROBE: NEGATIVE

## 2022-01-05 NOTE — RESULT ENCOUNTER NOTE
Please notify pt of normal GC Chlamydia results.  The pt is menstruating at the time of the testing  will await UC results  Guillermo Cramer M.D.

## 2022-01-06 LAB — BACTERIA UR CULT: NORMAL

## 2022-01-06 NOTE — PROGRESS NOTES
"HPI:  Mer Gonzalez is a 26 year old white female  Patient's last menstrual period was 2021 (exact date). condoms for contraception, who presents for evaluation of vaginal irritation.  The patient was seen on 2021  Thuy Lubin nurse practitioner the [facility] for vaginal irritation diagnosed with BV and treated with oral Flagyl 500 mg p.o. twice daily for 7 days.  Her symptoms seem to improve but then vaginal discharge and discomfort issues again exacerbated  and she was given a 2 g dose of oral metronidazole.  She was still having symptoms and an intermittent white vaginal discharge and on 2021 she was seen in the [facility] by Paolo Spear nurse practitioner.  The patient stated that she was in a committed monogamous relationship and denied any STD history or risk.  She started some over-the-counter cranberry supplements.  Scription for Diflucan 150 mg orally now and 1 repeat dose to follow.  Urine culture at that visit was also negative.  On 2022 the patient presented to the emergency department with complaints of dysuria.  Cultures for gonorrhea and chlamydia were negative.  A wet prep was negative.  The patient was started on Keflex 500 mg p.o. 4 times daily for 7 days.  Urine culture from that visit is negative.  The patient stopped taking the Keflex after 3 days because she \"states she did not feel well \"she feels her vaginal yeast symptoms are returning and wanted to be seen.  The patient presents today for follow-up.  She denies fevers chills nausea vomiting.  She states that she feels agitated and emotional.  She states her sexual partner is asymptomatic    History reviewed. No pertinent past medical history.  Past Surgical History:   Procedure Laterality Date     TONSILLECTOMY       WISDOM TOOTH EXTRACTION       Family History   Problem Relation Age of Onset     Kidney Cancer Father         age 48     Allergic rhinitis Sister  "     Social History     Socioeconomic History     Marital status: Single     Spouse name: Not on file     Number of children: Not on file     Years of education: Not on file     Highest education level: Not on file   Occupational History     Not on file   Tobacco Use     Smoking status: Never Smoker     Smokeless tobacco: Never Used   Vaping Use     Vaping Use: Never used   Substance and Sexual Activity     Alcohol use: Not Currently     Drug use: Never     Sexual activity: Yes     Partners: Male     Birth control/protection: Condom   Other Topics Concern     Not on file   Social History Narrative     Not on file     Social Determinants of Health     Financial Resource Strain: Not on file   Food Insecurity: Not on file   Transportation Needs: Not on file   Physical Activity: Not on file   Stress: Not on file   Social Connections: Not on file   Intimate Partner Violence: Not on file   Housing Stability: Not on file       Allergies:  Amoxicillin, Sulfamethoxazole-trimethoprim [sulfamethoxazole w/trimethoprim], and Latex    Current Outpatient Medications   Medication Sig Dispense Refill     albuterol (PROAIR HFA/PROVENTIL HFA/VENTOLIN HFA) 108 (90 Base) MCG/ACT inhaler Inhale 2 puffs into the lungs (Patient not taking: Reported on 12/28/2021)       cephALEXin (KEFLEX) 500 MG capsule Take 1 capsule (500 mg) by mouth 4 times daily for 7 days (Patient not taking: Reported on 1/4/2022) 28 capsule 0     valACYclovir (VALTREX) 500 MG tablet take 1 tablet 2 times daily Orally for  3 days (Patient not taking: Reported on 12/28/2021)         Review Of Systems   ROS: 10 point ROS neg other than the symptoms noted above in the HPI.    Exam:  /80   Wt 81.2 kg (179 lb)   LMP 12/06/2021 (Exact Date)   BMI 34.38 kg/m    {Constitutional: healthy, alert and no distress  Cardiovascular: negative, PMI normal. No lifts, heaves, or thrills. RRR. No murmurs, clicks gallops or rub  Respiratory: negative, Percussion normal. Good  diaphragmatic excursion. Lungs clear  Gastrointestinal: Abdomen soft, non-tender. BS normal. No masses, organomegaly  Genitourinary: Normal external genitalia without lesions and speculum Mellick appearing cervix cultures for GC chlamydia and a wet prep were done  The patient is menstruating  Assessment/Plan:  (R30.9) Pain with urination  (primary encounter diagnosis)  Comment: I have asked her to stop the Keflex since her urine culture was negative  Plan: Urine Culture Aerobic Bacterial - lab collect,         UA with Microscopic - lab collect, Urine         Microscopic Exam, Wet prep - Clinic Collect,         NEISSERIA GONORRHOEA PCR, CHLAMYDIA TRACHOMATIS        PCR        Cultures for STD today are negative    (N89.8) Vaginal irritation  Comment: Her wet prep was also negative  Plan: Wet prep - Clinic Collect, NEISSERIA GONORRHOEA        PCR, CHLAMYDIA TRACHOMATIS PCR        At this point Abril advised symptomatic cares.  I like to observe her over the next 48 to 72 hours to see how she does.  If she remains asymptomatic no further therapy will be undertaken.  If symptoms do return we may consider the option of boric acid suppositories postcoitally.  We discussed the use of a condom to minimize risk of irritants.  We will observe and see how the patient is doing      Guillermo Cramer M.D.

## 2022-01-11 ENCOUNTER — OFFICE VISIT (OUTPATIENT)
Dept: OBGYN | Facility: CLINIC | Age: 27
End: 2022-01-11
Payer: COMMERCIAL

## 2022-01-11 VITALS — WEIGHT: 178 LBS | DIASTOLIC BLOOD PRESSURE: 78 MMHG | BODY MASS INDEX: 34.19 KG/M2 | SYSTOLIC BLOOD PRESSURE: 122 MMHG

## 2022-01-11 DIAGNOSIS — R10.2 PELVIC PAIN IN FEMALE: Primary | ICD-10-CM

## 2022-01-11 DIAGNOSIS — R30.0 DYSURIA: ICD-10-CM

## 2022-01-11 DIAGNOSIS — N90.89 VULVAR IRRITATION: ICD-10-CM

## 2022-01-11 LAB
ALBUMIN UR-MCNC: 100 MG/DL
APPEARANCE UR: CLEAR
BACTERIA #/AREA URNS HPF: ABNORMAL /HPF
BILIRUB UR QL STRIP: NEGATIVE
CLUE CELLS: ABNORMAL
COLOR UR AUTO: YELLOW
GLUCOSE UR STRIP-MCNC: NEGATIVE MG/DL
HGB UR QL STRIP: ABNORMAL
KETONES UR STRIP-MCNC: NEGATIVE MG/DL
LEUKOCYTE ESTERASE UR QL STRIP: ABNORMAL
NITRATE UR QL: NEGATIVE
PH UR STRIP: 6.5 [PH] (ref 5–7)
RBC #/AREA URNS AUTO: ABNORMAL /HPF
SP GR UR STRIP: 1.02 (ref 1–1.03)
TRICHOMONAS, WET PREP: ABNORMAL
UROBILINOGEN UR STRIP-ACNC: 0.2 E.U./DL
WBC #/AREA URNS AUTO: ABNORMAL /HPF
WBC'S/HIGH POWER FIELD, WET PREP: ABNORMAL
YEAST, WET PREP: ABNORMAL

## 2022-01-11 PROCEDURE — 99213 OFFICE O/P EST LOW 20 MIN: CPT | Performed by: OBSTETRICS & GYNECOLOGY

## 2022-01-11 PROCEDURE — 87210 SMEAR WET MOUNT SALINE/INK: CPT | Performed by: OBSTETRICS & GYNECOLOGY

## 2022-01-11 PROCEDURE — 87086 URINE CULTURE/COLONY COUNT: CPT | Performed by: OBSTETRICS & GYNECOLOGY

## 2022-01-11 PROCEDURE — 81001 URINALYSIS AUTO W/SCOPE: CPT | Performed by: OBSTETRICS & GYNECOLOGY

## 2022-01-11 NOTE — PATIENT INSTRUCTIONS
You can reach your [facility] Care Team any time of the day by calling [phone]. This number will put you in touch with the 24 hour nurse line if the clinic is closed.    To contact your OB/GYN Station Coordinator/Surgery Scheduler please call [phone]. This is a direct number for your care team between 8 a.m. and 4 p.m. Monday through Friday.    [facility] is open for your convenience:  Monday through Friday 8 a.m. to 6 p.m.  Closed weekends and all major holidays.

## 2022-01-11 NOTE — NURSING NOTE
"Chief Complaint   Patient presents with     RECHECK       Initial /78   Wt 80.7 kg (178 lb)   LMP 2022   BMI 34.19 kg/m   Estimated body mass index is 34.19 kg/m  as calculated from the following:    Height as of 22: 1.537 m (5' 0.5\").    Weight as of this encounter: 80.7 kg (178 lb).  BP completed using cuff size: regular    Questioned patient about current smoking habits.  Pt. has never smoked.          The following HM Due: NONE      The following patient reported/Care Every where data was sent to:  P ABSTRACT QUALITY INITIATIVES [[id number]]        Dawn Lay CMA                 "

## 2022-01-12 LAB — BACTERIA UR CULT: NORMAL

## 2022-01-12 RX ORDER — NITROFURANTOIN 25; 75 MG/1; MG/1
100 CAPSULE ORAL 2 TIMES DAILY
Qty: 14 CAPSULE | Refills: 0 | Status: SHIPPED | OUTPATIENT
Start: 2022-01-12 | End: 2022-01-19

## 2022-01-12 NOTE — PROGRESS NOTES
HPI:  Mer Gonzalez is a 26 year old white female  Patient's last menstrual period was 2022.  Condoms for contraception, who presents for evaluation of and follow-up with the office that of 2022.  Please see that note for full details.  At present the patient describes a pulsing sensation of her bladder with a burning sensation in the pelvis with intermittent frequency urgency that seems to be getting more prominent.  Her menses has subsided.  She has not no abnormal vaginal discharge no odor no pain with intercourse.    Antibiotic history as noted.  No fevers chills low back pain or overt hematuria.    History reviewed. No pertinent past medical history.  Past Surgical History:   Procedure Laterality Date     TONSILLECTOMY       WISDOM TOOTH EXTRACTION       Family History   Problem Relation Age of Onset     Kidney Cancer Father         age 48     Allergic rhinitis Sister      Social History     Socioeconomic History     Marital status: Single     Spouse name: Not on file     Number of children: Not on file     Years of education: Not on file     Highest education level: Not on file   Occupational History     Not on file   Tobacco Use     Smoking status: Never Smoker     Smokeless tobacco: Never Used   Vaping Use     Vaping Use: Never used   Substance and Sexual Activity     Alcohol use: Not Currently     Drug use: Never     Sexual activity: Yes     Partners: Male     Birth control/protection: Condom   Other Topics Concern     Not on file   Social History Narrative     Not on file     Social Determinants of Health     Financial Resource Strain: Not on file   Food Insecurity: Not on file   Transportation Needs: Not on file   Physical Activity: Not on file   Stress: Not on file   Social Connections: Not on file   Intimate Partner Violence: Not on file   Housing Stability: Not on file       Allergies:  Amoxicillin, Sulfamethoxazole-trimethoprim [sulfamethoxazole w/trimethoprim], and  Latex    Current Outpatient Medications   Medication Sig Dispense Refill     albuterol (PROAIR HFA/PROVENTIL HFA/VENTOLIN HFA) 108 (90 Base) MCG/ACT inhaler Inhale 2 puffs into the lungs (Patient not taking: Reported on 12/28/2021)       valACYclovir (VALTREX) 500 MG tablet take 1 tablet 2 times daily Orally for  3 days (Patient not taking: Reported on 12/28/2021)         Review Of Systems   ROS: 10 point ROS neg other than the symptoms noted above in the HPI.    Exam:  /78   Wt 80.7 kg (178 lb)   LMP 01/01/2022   BMI 34.19 kg/m    {Constitutional: healthy, alert and mild distress  Cardiovascular: negative, PMI normal. No lifts, heaves, or thrills. RRR. No murmurs, clicks gallops or rub  Respiratory: negative, Percussion normal. Good diaphragmatic excursion. Lungs clear  Gastrointestinal: Abdomen soft, mildly-tender in the suprapubic and bilateral lower abdominal region.  No rebound or guarding. BS normal. No masses, organomegaly  Genitourinary: Normal external genitalia without lesions and speculum nulliparous appearing cervix a wet prep was taken today and was negative for trichomonas yeast and clue cells.  Bimanual exam the uterus is nulliparous mobile with a suggestion of an approximate 2 cm fibroid in the anterior right fundal region, adnexa without masses enlargement or tenderness, rectovaginal exam no masses or nodularity noted    Assessment/Plan:  (R10.2) Pelvic pain in female  (primary encounter diagnosis)  Comment: Wet prep today shows multiple white blood cells otherwise normal exam.  Urinalysis shows moderate bacteria with  white cells per high-power field  Plan: Urine Culture Aerobic Bacterial - lab collect,         UA with Microscopic - lab collect, Wet prep -         Clinic Collect, Urine Microscopic Exam  I am going to empirically start the patient on Macrobid 100 mg p.o. twice daily for 7 days and she will let me know how she is doing.  If the urine culture and sensitivities show  resistance appropriate therapy will be undertaken    (N90.89) Vulvar irritation  Comment: I will start with treating the bladder symptoms and observe.  If her symptoms are not improved may consider a course of boric acid suppositories postmenstrual  Plan: Detailed written plan given      Guillermo Cramer M.D.

## 2022-01-12 NOTE — RESULT ENCOUNTER NOTE
I spoke with the patient today and relayed to her the results of her wet prep and urine analysis.  I did empirically start her on Macrobid 100 mg p.o. twice daily for 7 days and of asked that she pick that prescription up at the pharmacy.  I would like to see her back in the office in 2 weeks to document complete resolution of her symptoms.  The patient will make that appointment

## 2022-01-16 NOTE — RESULT ENCOUNTER NOTE
Please inform the patient of the negative urine culture result.  I would like to see her back in the office to document resolution of her symptoms

## 2022-01-25 ENCOUNTER — LAB (OUTPATIENT)
Dept: LAB | Facility: CLINIC | Age: 27
End: 2022-01-25
Payer: COMMERCIAL

## 2022-01-25 DIAGNOSIS — R30.0 DYSURIA: ICD-10-CM

## 2022-01-25 LAB
ALBUMIN UR-MCNC: 30 MG/DL
APPEARANCE UR: CLEAR
BACTERIA #/AREA URNS HPF: ABNORMAL /HPF
BILIRUB UR QL STRIP: NEGATIVE
COLOR UR AUTO: YELLOW
GLUCOSE UR STRIP-MCNC: NEGATIVE MG/DL
HGB UR QL STRIP: ABNORMAL
KETONES UR STRIP-MCNC: NEGATIVE MG/DL
LEUKOCYTE ESTERASE UR QL STRIP: NEGATIVE
NITRATE UR QL: NEGATIVE
PH UR STRIP: 5.5 [PH] (ref 5–8)
RBC #/AREA URNS AUTO: ABNORMAL /HPF
SP GR UR STRIP: >=1.03 (ref 1–1.03)
SQUAMOUS #/AREA URNS AUTO: ABNORMAL /LPF
UROBILINOGEN UR STRIP-ACNC: 0.2 E.U./DL
WBC #/AREA URNS AUTO: ABNORMAL /HPF

## 2022-01-25 PROCEDURE — 87086 URINE CULTURE/COLONY COUNT: CPT

## 2022-01-25 PROCEDURE — 81001 URINALYSIS AUTO W/SCOPE: CPT

## 2022-01-25 NOTE — RESULT ENCOUNTER NOTE
I left the patient a voicemail message to return my call. I also sent her a my chart message. I am going to wait for the results of the urine culture before making any further antibiotics or other treatment recommendations. Her clinical presentation has been very complex and I want to have an organism identified in order to proceed with the correct therapy. I expect to have this urine culture result back within 24 to 48 hours.  Guillermo Cramer MD FACOG

## 2022-01-26 LAB — BACTERIA UR CULT: NORMAL

## 2022-02-04 ENCOUNTER — LAB (OUTPATIENT)
Dept: LAB | Facility: CLINIC | Age: 27
End: 2022-02-04
Payer: COMMERCIAL

## 2022-02-04 DIAGNOSIS — R35.0 URINARY FREQUENCY: ICD-10-CM

## 2022-02-04 DIAGNOSIS — N90.89 VULVAR IRRITATION: ICD-10-CM

## 2022-02-04 LAB
ALBUMIN UR-MCNC: 30 MG/DL
AMORPH CRY #/AREA URNS HPF: ABNORMAL /HPF
APPEARANCE UR: ABNORMAL
BACTERIA #/AREA URNS HPF: ABNORMAL /HPF
BILIRUB UR QL STRIP: NEGATIVE
CLUE CELLS: ABNORMAL
COLOR UR AUTO: YELLOW
GLUCOSE UR STRIP-MCNC: NEGATIVE MG/DL
HGB UR QL STRIP: ABNORMAL
KETONES UR STRIP-MCNC: NEGATIVE MG/DL
LEUKOCYTE ESTERASE UR QL STRIP: NEGATIVE
NITRATE UR QL: NEGATIVE
PH UR STRIP: 6.5 [PH] (ref 5–8)
RBC #/AREA URNS AUTO: ABNORMAL /HPF
SP GR UR STRIP: 1.02 (ref 1–1.03)
SQUAMOUS #/AREA URNS AUTO: ABNORMAL /LPF
TRICHOMONAS, WET PREP: ABNORMAL
UROBILINOGEN UR STRIP-ACNC: 0.2 E.U./DL
WBC #/AREA URNS AUTO: ABNORMAL /HPF
WBC'S/HIGH POWER FIELD, WET PREP: ABNORMAL
YEAST, WET PREP: ABNORMAL

## 2022-02-04 PROCEDURE — 87210 SMEAR WET MOUNT SALINE/INK: CPT

## 2022-02-04 PROCEDURE — 87086 URINE CULTURE/COLONY COUNT: CPT

## 2022-02-04 PROCEDURE — 81001 URINALYSIS AUTO W/SCOPE: CPT

## 2022-02-05 LAB — BACTERIA UR CULT: NO GROWTH

## 2022-02-07 ENCOUNTER — OFFICE VISIT (OUTPATIENT)
Dept: OBGYN | Facility: CLINIC | Age: 27
End: 2022-02-07
Payer: COMMERCIAL

## 2022-02-07 VITALS — SYSTOLIC BLOOD PRESSURE: 130 MMHG | DIASTOLIC BLOOD PRESSURE: 76 MMHG | BODY MASS INDEX: 35.15 KG/M2 | WEIGHT: 183 LBS

## 2022-02-07 DIAGNOSIS — N32.81 URGENCY-FREQUENCY SYNDROME: Primary | ICD-10-CM

## 2022-02-07 PROCEDURE — 99213 OFFICE O/P EST LOW 20 MIN: CPT | Performed by: OBSTETRICS & GYNECOLOGY

## 2022-02-07 RX ORDER — TOLTERODINE 4 MG/1
4 CAPSULE, EXTENDED RELEASE ORAL DAILY
Qty: 45 CAPSULE | Refills: 0 | Status: SHIPPED | OUTPATIENT
Start: 2022-02-07 | End: 2022-03-25

## 2022-02-07 NOTE — PROGRESS NOTES
Mer Gonzalez is a 26 year old female  condoms for contraception who presents today for a follow-up of the office visits of 2022 in 2022. On 2022 the patient called the office complaining of intermittent urgency and bladder contractions. Over the same past weekend she noticed a single gush of vaginal discharge not associated with itching irritation or malodor. No other recent antibiotic exposure other than as documented in the chart. No recent intercourse. Her last period ended on 2022. At present the patient states that she occasionally gets a pulsing sensation suprapubically. It can be once a day or once every other day. It can be short-lived or can last up to 3 to 4 h. Seems to be a little worse after she empties her bladder. It seems to be better when she lays on her back and brings her knees up by her chest. Again she denies hematuria fevers chills flank pain unscheduled vaginal bleeding or colorectal symptoms. I reviewed the results of her urine analysis and culture from 2022 as well as a negative wet prep    History reviewed. No pertinent past medical history.  Current Outpatient Medications   Medication     tolterodine ER (DETROL LA) 4 MG 24 hr capsule     albuterol (PROAIR HFA/PROVENTIL HFA/VENTOLIN HFA) 108 (90 Base) MCG/ACT inhaler     valACYclovir (VALTREX) 500 MG tablet     No current facility-administered medications for this visit.     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 Ideally I have asked that she abstain from intercourse during this period of time. I have advised the patient that she can buy these over-the-counter through [employer] and  she will do that. I have also asked the patient to begin Detrol LA 4 mg daily. And she is going to keep a voiding diary. I have asked her to keep a voiding diary for 2 days then begin the Detrol LA beyond that for a period of a month and then repeat the voiding diary for 2 days. I like to see her back in the office in approximately a month when she completes her voiding diary. She'll call for any concerns in the interval. A detailed written plan was given  Plan: tolterodine ER (DETROL LA) 4 MG 24 hr capsule        As above

## 2022-02-07 NOTE — NURSING NOTE
"Chief Complaint   Patient presents with     RECHECK       Initial /76   Wt 83 kg (183 lb)   LMP 2022   BMI 35.15 kg/m   Estimated body mass index is 35.15 kg/m  as calculated from the following:    Height as of 22: 1.537 m (5' 0.5\").    Weight as of this encounter: 83 kg (183 lb).  BP completed using cuff size: regular    Questioned patient about current smoking habits.  Pt. has never smoked.          The following HM Due: NONE      The following patient reported/Care Every where data was sent to:  P ABSTRACT QUALITY INITIATIVES [[id number]]        [facility]                 "

## 2022-02-07 NOTE — PATIENT INSTRUCTIONS
You can reach your [facility] Care Team any time of the day by calling [phone]. This number will put you in touch with the 24 hour nurse line if the clinic is closed.    To contact your OB/GYN Station Coordinator/Surgery Scheduler please call [phone]. This is a direct number for your care team between 8 a.m. and 4 p.m. Monday through Friday.    [facility] is open for your convenience:  Monday through Friday 8 a.m. to 6 p.m.  Closed weekends and all major holidays.

## 2022-02-13 ENCOUNTER — HOSPITAL ENCOUNTER (EMERGENCY)
Facility: CLINIC | Age: 27
Discharge: HOME | End: 2022-02-13
Attending: EMERGENCY MEDICINE | Admitting: EMERGENCY MEDICINE
Payer: COMMERCIAL

## 2022-02-13 ENCOUNTER — APPOINTMENT (OUTPATIENT)
Dept: ULTRASOUND IMAGING | Facility: CLINIC | Age: 27
End: 2022-02-13
Attending: EMERGENCY MEDICINE
Payer: COMMERCIAL

## 2022-02-13 VITALS
SYSTOLIC BLOOD PRESSURE: 160 MMHG | BODY MASS INDEX: 34.58 KG/M2 | OXYGEN SATURATION: 99 % | WEIGHT: 180 LBS | DIASTOLIC BLOOD PRESSURE: 96 MMHG | RESPIRATION RATE: 24 BRPM | TEMPERATURE: 98.1 F | HEART RATE: 67 BPM

## 2022-02-13 DIAGNOSIS — R30.1 PAINFUL BLADDER SPASM: ICD-10-CM

## 2022-02-13 DIAGNOSIS — N30.00 ACUTE CYSTITIS WITHOUT HEMATURIA: ICD-10-CM

## 2022-02-13 LAB
ALBUMIN UR-MCNC: NEGATIVE MG/DL
APPEARANCE UR: CLEAR
BACTERIA #/AREA URNS HPF: ABNORMAL /HPF
BILIRUB UR QL STRIP: NEGATIVE
CLUE CELLS: ABNORMAL
COLOR UR AUTO: COLORLESS
GLUCOSE UR STRIP-MCNC: NEGATIVE MG/DL
HGB UR QL STRIP: ABNORMAL
KETONES UR STRIP-MCNC: NEGATIVE MG/DL
LEUKOCYTE ESTERASE UR QL STRIP: ABNORMAL
NITRATE UR QL: NEGATIVE
PH UR STRIP: 6.5 [PH] (ref 5–7)
RBC URINE: 1 /HPF
SP GR UR STRIP: 1 (ref 1–1.03)
SQUAMOUS EPITHELIAL: 4 /HPF
TRICHOMONAS, WET PREP: ABNORMAL
UROBILINOGEN UR STRIP-MCNC: <2 MG/DL
WBC URINE: 40 /HPF
WBC'S/HIGH POWER FIELD, WET PREP: ABNORMAL
YEAST, WET PREP: ABNORMAL

## 2022-02-13 PROCEDURE — 81001 URINALYSIS AUTO W/SCOPE: CPT | Performed by: EMERGENCY MEDICINE

## 2022-02-13 PROCEDURE — 87210 SMEAR WET MOUNT SALINE/INK: CPT | Performed by: EMERGENCY MEDICINE

## 2022-02-13 PROCEDURE — 87086 URINE CULTURE/COLONY COUNT: CPT | Performed by: EMERGENCY MEDICINE

## 2022-02-13 PROCEDURE — 250N000013 HC RX MED GY IP 250 OP 250 PS 637: Performed by: EMERGENCY MEDICINE

## 2022-02-13 PROCEDURE — 76856 US EXAM PELVIC COMPLETE: CPT

## 2022-02-13 PROCEDURE — 51798 US URINE CAPACITY MEASURE: CPT

## 2022-02-13 PROCEDURE — 99285 EMERGENCY DEPT VISIT HI MDM: CPT | Mod: 25

## 2022-02-13 RX ORDER — PHENAZOPYRIDINE HYDROCHLORIDE 200 MG/1
200 TABLET, FILM COATED ORAL 3 TIMES DAILY PRN
Qty: 9 TABLET | Refills: 0 | Status: SHIPPED | OUTPATIENT
Start: 2022-02-13 | End: 2022-02-16

## 2022-02-13 RX ORDER — NITROFURANTOIN 25; 75 MG/1; MG/1
100 CAPSULE ORAL 2 TIMES DAILY
Qty: 10 CAPSULE | Refills: 0 | Status: SHIPPED | OUTPATIENT
Start: 2022-02-13 | End: 2022-02-18

## 2022-02-13 RX ORDER — IBUPROFEN 600 MG/1
600 TABLET, FILM COATED ORAL ONCE
Status: COMPLETED | OUTPATIENT
Start: 2022-02-13 | End: 2022-02-13

## 2022-02-13 RX ADMIN — IBUPROFEN 600 MG: 600 TABLET ORAL at 13:23

## 2022-02-13 ASSESSMENT — ENCOUNTER SYMPTOMS
COLOR CHANGE: 0
SHORTNESS OF BREATH: 0
HEADACHES: 0
ABDOMINAL PAIN: 1
DIFFICULTY URINATING: 1
EYE REDNESS: 0
CONFUSION: 0
FEVER: 0
ARTHRALGIAS: 0
NECK STIFFNESS: 0
DYSURIA: 1

## 2022-02-13 NOTE — ED PROVIDER NOTES
EMERGENCY DEPARTMENT ENCOUNTER     NAME: Mer Gonzalez   AGE: 26 year old female   YOB: 1995   MRN: [id number]   EVALUATION DATE & TIME: 2/13/2022 12:08 PM   PCP: Lesly Mauro     Chief Complaint   Patient presents with     Abdominal Pain   :    FINAL IMPRESSION       1. Painful bladder spasm    2. Acute cystitis without hematuria           ED COURSE & MEDICAL DECISION MAKING      Pertinent Labs & Imaging studies reviewed. (See chart for details)   26 year old female  presents to the Emergency Department for evaluation of painful bladder spasms that have been ongoing for about a month, but worse this weekend.  On chart review, I looked and I have seen that she has had testing at multiple clinic visits, was last seen by OB/GYN within the last week and they started Detrol and recommended boric acid suppositories.  She has had urinalysis and culture that are negative, negative wet prep, and is scheduled to see urology next month. Initial Vitals Reviewed. Initial exam notable for patient who was initially tearful due to the discomfort.  Her initial concern was for incomplete emptying sensation and concern for retention.  Bladder scan was done and actually shows an empty bladder.  I did discuss some of the limitations of the emergency department as this appears to be more of an acute worsening of a more subacute to chronic problem and were somewhat limited in how to manage it.  Using shared decision making, we decided to repeat a urinalysis to ensure that she has not developed a new infection, and also repeat a wet prep for this reason.  Wet prep is negative.  Urinalysis, will somewhat contaminated with a few skin cells, does have white blood cells, leukocytes, and in the setting of in a patient with incomplete emptying, dysuria, and such a progression of her acute pain I think it is reasonable for us to go ahead and treat as a potential UTI.  This may help with the acute worsening of her symptoms  and she is quite comfortable with this plan.  Were also going to try some Pyridium for 3 days for the acute discomfort and see if this can improve her symptoms.  She will still follow-up with OB/GYN and with urology as scheduled and is comfortable with discharge at this time.    Update: I was called back into the patient's room after initial discharge discussion and she is quite tearful.  I do suspect that there is some underlying anxiety about all of this and some frustration related to the lack of diagnosis.  She specifically requested whether we could do a pelvic ultrasound just to ensure that there is no ovarian pathology that could be causing her symptoms.  I looked back through all of her chart records and she has had several tests and clinic visits, but she actually has not had this pelvic imaging so I think it is reasonable.  I have ordered a pelvic ultrasound, though I have less suspicion that ovarian pathology is what is causing the discomfort, and this test is pending at time of signout.           At the conclusion of the encounter I discussed the results of all of the tests and the disposition. The questions were answered. The patient or family acknowledged understanding and was agreeable with the care plan.         12:10 PM Medical student met with patient and performed initial interview and exam. Discussed plan of care.  1:44 PM Checked in on and updated patient. I discussed the plan for discharge with the patient, and patient is agreeable.  We discussed supportive cares at home and reasons for return to the ER including new or worsening symptoms - all questions and concerns addressed.  Patient to be discharged by RN.     MEDICATIONS GIVEN IN THE EMERGENCY:   Medications - No data to display   NEW PRESCRIPTIONS STARTED AT TODAY'S ER VISIT   New Prescriptions    No medications on file     ================================================================   HISTORY OF PRESENT ILLNESS       Patient  information was obtained from: patient    Use of Intrepreter: N/A     Mer Gonzalez is a 26 year old female with no pertinent history who presents to the Emergency Department for evaluation of bladder pain.      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 She was first seen for this problem by OBGYN on 1/4/2022. At this visit, patient endorsed an occasional pulsing sensation in her suprapubic area. It can be once a day or every other day and be short-lived or last 3-4 hours. Endorsed that pulsing is slightly worse after emptying her bladder. Patient feels better when she lays on her back and brings her knees up to her chest. From labs done on 2/4/2022, patient had an insignificant urinalysis, urine culture, and wet prep. Patient was instructed to begin boric acid suppositories 60 mg intravaginally beginning after the completion of her menses and continuing for 10 days. Ideally, patient would also abstain from intercourse during this period of time. Patient also instructed to begin Detrol LA 4 mg daily. She was told to also void dairy for 2 days and then begin the Detrol LA for a month.     Patient endorses dealing with chronic suprapubic abdominal pain where her bladder feels like its intermittently pulsating and burning. Patient is taking the Detrol LA but has not started the Boric acid yet. Patient presents today because when she tried to urinate, she felt that she was not fully emptying her bladder and that the burning sensation was worse than normal.     Patient has an appointment with a urologist in 3/2022. Patient denies any other complaints at this time.     ================================================================    REVIEW OF SYSTEMS       Review of Systems   Constitutional: Negative for fever.   HENT: Negative for congestion.    Eyes: Negative for redness.   Respiratory: Negative for shortness of breath.    Cardiovascular: Negative  for chest pain.   Gastrointestinal: Positive for abdominal pain (suprapubic).   Genitourinary: Positive for difficulty urinating and dysuria.   Musculoskeletal: Negative for arthralgias and neck stiffness.   Skin: Negative for color change.   Neurological: Negative for headaches.   Psychiatric/Behavioral: Negative for confusion.   All other systems reviewed and are negative.      PAST HISTORY     PAST MEDICAL HISTORY:   No past medical history on file.   PAST SURGICAL HISTORY:   Past Surgical History:   Procedure Laterality Date     TONSILLECTOMY       WISDOM TOOTH EXTRACTION  2011      CURRENT MEDICATIONS:   albuterol (PROAIR HFA/PROVENTIL HFA/VENTOLIN HFA) 108 (90 Base) MCG/ACT inhaler  tolterodine ER (DETROL LA) 4 MG 24 hr capsule  valACYclovir (VALTREX) 500 MG tablet      ALLERGIES:   Allergies   Allergen Reactions     Amoxicillin Hives     Sulfamethoxazole-Trimethoprim [Sulfamethoxazole W/Trimethoprim] Unknown     Latex Hives, Itching, Rash and Swelling      FAMILY HISTORY:   Family History   Problem Relation Age of Onset     Kidney Cancer Father         age 48     Allergic rhinitis Sister       SOCIAL HISTORY:   Social History     Socioeconomic History     Marital status: Single     Spouse name: Not on file     Number of children: Not on file     Years of education: Not on file     Highest education level: Not on file   Occupational History     Not on file   Tobacco Use     Smoking status: Never Smoker     Smokeless tobacco: Never Used   Vaping Use     Vaping Use: Never used   Substance and Sexual Activity     Alcohol use: Not Currently     Drug use: Never     Sexual activity: Yes     Partners: Male     Birth control/protection: Condom   Other Topics Concern     Not on file   Social History Narrative     Not on file     Social Determinants of Health     Financial Resource Strain: Not on file   Food Insecurity: Not on file   Transportation Needs: Not on file   Physical Activity: Not on file   Stress: Not on  file   Social Connections: Not on file   Intimate Partner Violence: Not on file   Housing Stability: Not on file        VITALS  Patient Vitals for the past 24 hrs:   BP Temp Temp src Pulse Resp SpO2 Weight   02/13/22 1206 (!) 216/133 98.1  F (36.7  C) Oral 104 24 97 % 81.6 kg (180 lb)        ================================================================    PHYSICAL EXAM     VITAL SIGNS: BP (!) 216/133   Pulse 104   Temp 98.1  F (36.7  C) (Oral)   Resp 24   Wt 81.6 kg (180 lb)   LMP 01/28/2022   SpO2 97%   BMI 34.58 kg/m     Constitutional:  Awake, no acute distress. Tearful.   HENT:  Atraumatic, oropharynx without exudate or erythema, membranes moist  Lymph:  No adenopathy  Eyes: EOM intact, PERRL, no injection  Neck: Supple  Respiratory:  Clear to auscultation bilaterally, no wheezes or crackles   Cardiovascular:  Regular rate and rhythm, single S1 and S2   GI:  Soft, nondistended, no rebound or guarding. Suprapubic tenderness.   Musculoskeletal:  Moves all extremities, no lower extremity edema, no deformities    Skin:  Warm, dry  Neurologic:  Alert and oriented x3, no focal deficits noted       ================================================================  LAB       All pertinent labs reviewed and interpreted.   Labs Ordered and Resulted from Time of ED Arrival to Time of ED Departure   ROUTINE UA WITH MICROSCOPIC REFLEX TO CULTURE - Abnormal       Result Value    Color Urine Colorless      Appearance Urine Clear      Glucose Urine Negative      Bilirubin Urine Negative      Ketones Urine Negative      Specific Gravity Urine 1.004      Blood Urine 0.06 mg/dL (*)     pH Urine 6.5      Protein Albumin Urine Negative      Urobilinogen Urine <2.0      Nitrite Urine Negative      Leukocyte Esterase Urine 250 Devendra/uL (*)     Bacteria Urine Few (*)     RBC Urine 1      WBC Urine 40 (*)     Squamous Epithelials Urine 4 (*)    WET PREPARATION - Abnormal    Trichomonas Absent      Yeast Absent      Clue Cells  Absent      WBCs/high power field 2+ (*)    URINE CULTURE        ===============================================================  RADIOLOGY       Reviewed all pertinent imaging. Please see official radiology report.   No orders to display         ================================================================  EKG         I have independently reviewed and interpreted the EKG(s) documented above.     ================================================================  PROCEDURES         I, Alize Jacques, am serving as a scribe to document services personally performed by Dr. Armendariz based on my observation and the provider's statements to me. I, Vivian Armendariz MD attest that Alize Jacques is acting in a scribe capacity, has observed my performance of the services and has documented them in accordance with my direction.   Vivian Armendariz M.D.   Emergency Medicine   [facility] EMERGENCY ROOM  [address] [address]  [phone]  Dept: [phone]      Vivian Armendariz MD  02/13/22 [id number]       Vivian Armendariz MD  02/13/22 [id number]

## 2022-02-13 NOTE — ED NOTES
Patient discharged home with AVS and script for meds. Will follow up with OBGYN and urologist at discharge.

## 2022-02-13 NOTE — DISCHARGE INSTRUCTIONS
As we discussed, I am not sure that it is the cause of all of your symptoms over the last month and I recommend you do keep taking the medication as prescribed by the gynecologist and follow-up with the urologist next month, but it does look like you may have an acute urinary tract infection today and this may be making the spasms and pain worse than typical.  Because of this, we are going to try treating with an antibiotic and some Pyridium for the bladder discomfort.  Take the medication as prescribed and then continue to follow-up with your outpatient providers for this issue.

## 2022-02-14 LAB — BACTERIA UR CULT: ABNORMAL

## 2022-02-17 ENCOUNTER — TRANSFERRED RECORDS (OUTPATIENT)
Dept: HEALTH INFORMATION MANAGEMENT | Facility: CLINIC | Age: 27
End: 2022-02-17
Payer: COMMERCIAL

## 2022-02-23 ENCOUNTER — LAB (OUTPATIENT)
Dept: LAB | Facility: CLINIC | Age: 27
End: 2022-02-23
Payer: COMMERCIAL

## 2022-02-23 DIAGNOSIS — R30.0 DYSURIA: ICD-10-CM

## 2022-02-23 DIAGNOSIS — N32.81 URGENCY-FREQUENCY SYNDROME: ICD-10-CM

## 2022-02-23 LAB
ALBUMIN UR-MCNC: 30 MG/DL
APPEARANCE UR: CLEAR
BACTERIA #/AREA URNS HPF: ABNORMAL /HPF
BILIRUB UR QL STRIP: NEGATIVE
COLOR UR AUTO: YELLOW
GLUCOSE UR STRIP-MCNC: NEGATIVE MG/DL
HGB UR QL STRIP: ABNORMAL
KETONES UR STRIP-MCNC: NEGATIVE MG/DL
LEUKOCYTE ESTERASE UR QL STRIP: ABNORMAL
NITRATE UR QL: POSITIVE
PH UR STRIP: 6.5 [PH] (ref 5–8)
RBC #/AREA URNS AUTO: ABNORMAL /HPF
SP GR UR STRIP: 1.02 (ref 1–1.03)
SQUAMOUS #/AREA URNS AUTO: ABNORMAL /LPF
UROBILINOGEN UR STRIP-ACNC: 0.2 E.U./DL
WBC #/AREA URNS AUTO: ABNORMAL /HPF

## 2022-02-23 PROCEDURE — 87086 URINE CULTURE/COLONY COUNT: CPT

## 2022-02-23 PROCEDURE — 81001 URINALYSIS AUTO W/SCOPE: CPT

## 2022-02-23 PROCEDURE — 87186 SC STD MICRODIL/AGAR DIL: CPT

## 2022-02-24 ENCOUNTER — TELEPHONE (OUTPATIENT)
Dept: OBGYN | Facility: CLINIC | Age: 27
End: 2022-02-24

## 2022-02-24 LAB — BACTERIA UR CULT: ABNORMAL

## 2022-02-24 NOTE — TELEPHONE ENCOUNTER
I spoke with the patient today inquiring how her pelvic floor and bladder symptoms are doing.  The patient completed a course of Macrobid on Friday, 2/18/2022.  She also had been given a prescription for Detrol LA 4 mg 1 tablet daily in an effort to improve her urgency and frequency.  The patient states that the medication made her bladder symptoms worse.  The patient had a urinalysis and culture done yesterday afternoon.  The urine micro showed a few bacteria 5-10 red blood cells and 10-25 white cells per high-power field with a few squamous epithelial cells.  She denies fevers chills or low back pain.  The patient also saw Dr. Geovani Valdivia a  urologist from [facility] yesterday.  The patient has a CT urogram ordered for 2/28/2022.  At this point when I see with the results of her urine culture shows only treated appropriately.  The CT urogram is can be very helpful to rule out stone or other urologic structural abnormalities that potentially could be causing discomfort.  The patient is comfortable with this plan and will let us know if there is any acute exacerbation

## 2022-02-25 ENCOUNTER — MYC MEDICAL ADVICE (OUTPATIENT)
Dept: OBGYN | Facility: CLINIC | Age: 27
End: 2022-02-25
Payer: COMMERCIAL

## 2022-02-25 DIAGNOSIS — N30.90 BLADDER INFECTION: Primary | ICD-10-CM

## 2022-02-25 RX ORDER — NITROFURANTOIN 25; 75 MG/1; MG/1
100 CAPSULE ORAL 2 TIMES DAILY
Qty: 10 CAPSULE | Refills: 0 | Status: SHIPPED | OUTPATIENT
Start: 2022-02-25 | End: 2022-03-02

## 2022-02-25 NOTE — TELEPHONE ENCOUNTER
Please address the my chart message.  Routed to md on-call today as   Dr. Cramer is not in office this afternoon.  DANIELITO Maki RN

## 2022-02-28 ENCOUNTER — TELEPHONE (OUTPATIENT)
Dept: OBGYN | Facility: CLINIC | Age: 27
End: 2022-02-28

## 2022-02-28 ENCOUNTER — HOSPITAL ENCOUNTER (OUTPATIENT)
Dept: CT IMAGING | Facility: CLINIC | Age: 27
Discharge: HOME | End: 2022-02-28
Attending: UROLOGY | Admitting: UROLOGY
Payer: COMMERCIAL

## 2022-02-28 DIAGNOSIS — R31.9 HEMATURIA, UNSPECIFIED TYPE: ICD-10-CM

## 2022-02-28 PROCEDURE — 74178 CT ABD&PLV WO CNTR FLWD CNTR: CPT

## 2022-02-28 PROCEDURE — 250N000011 HC RX IP 250 OP 636: Performed by: UROLOGY

## 2022-02-28 RX ORDER — IOPAMIDOL 755 MG/ML
100 INJECTION, SOLUTION INTRAVASCULAR ONCE
Status: COMPLETED | OUTPATIENT
Start: 2022-02-28 | End: 2022-02-28

## 2022-02-28 RX ADMIN — IOPAMIDOL 100 ML: 755 INJECTION, SOLUTION INTRAVENOUS at 15:29

## 2022-02-28 NOTE — TELEPHONE ENCOUNTER
I spoke with the patient this morning.  She got her prescription for Macrobid that was prescribed to her several days ago has started and states that her bladder symptoms are much better.  She does have a CT urogram scheduled today.  We will review these results and develop an appropriate therapy plan based on them  Guillermo Cramer MD FACOG

## 2022-03-08 ENCOUNTER — LAB (OUTPATIENT)
Dept: LAB | Facility: CLINIC | Age: 27
End: 2022-03-08
Payer: COMMERCIAL

## 2022-03-08 ENCOUNTER — TELEPHONE (OUTPATIENT)
Dept: OBGYN | Facility: CLINIC | Age: 27
End: 2022-03-08
Payer: COMMERCIAL

## 2022-03-08 DIAGNOSIS — R30.0 DYSURIA: ICD-10-CM

## 2022-03-08 DIAGNOSIS — R30.0 DYSURIA: Primary | ICD-10-CM

## 2022-03-08 LAB
ALBUMIN UR-MCNC: 100 MG/DL
APPEARANCE UR: CLEAR
BACTERIA #/AREA URNS HPF: ABNORMAL /HPF
BILIRUB UR QL STRIP: NEGATIVE
COLOR UR AUTO: YELLOW
GLUCOSE UR STRIP-MCNC: NEGATIVE MG/DL
HGB UR QL STRIP: ABNORMAL
KETONES UR STRIP-MCNC: NEGATIVE MG/DL
LEUKOCYTE ESTERASE UR QL STRIP: ABNORMAL
NITRATE UR QL: POSITIVE
PH UR STRIP: 6 [PH] (ref 5–8)
RBC #/AREA URNS AUTO: ABNORMAL /HPF
SP GR UR STRIP: >=1.03 (ref 1–1.03)
SQUAMOUS #/AREA URNS AUTO: ABNORMAL /LPF
UROBILINOGEN UR STRIP-ACNC: 0.2 E.U./DL
WBC #/AREA URNS AUTO: ABNORMAL /HPF

## 2022-03-08 PROCEDURE — 81001 URINALYSIS AUTO W/SCOPE: CPT

## 2022-03-08 PROCEDURE — 87086 URINE CULTURE/COLONY COUNT: CPT

## 2022-03-08 NOTE — TELEPHONE ENCOUNTER
Please contact the pt.  I have placed an order for a [facility] culture if pos.  Can you please ask the pt to have this done  Thanks  Guillermo Cramer M.D.

## 2022-03-10 LAB — BACTERIA UR CULT: NORMAL

## 2022-03-17 ENCOUNTER — TELEPHONE (OUTPATIENT)
Dept: OBGYN | Facility: CLINIC | Age: 27
End: 2022-03-17
Payer: COMMERCIAL

## 2022-03-17 NOTE — TELEPHONE ENCOUNTER
I left a voicemail message for the patient to return my call.  I was inquiring how her symptoms of bladder irritability and pelvic floor discomfort were doing.  Her urine culture has been recently normal but pyuria was present.  I suspect this was a vaginal contaminant.  The patient has not responded to antimuscarinic therapies with Detrol and was presently scheduled to undergo pelvic floor biofeedback therapy.  If this does not do well I believe the patient would be a good candidate for advanced therapies such as neuro sacral modulation.  I would like to see her back in the office to reassess her progress  Could you please reach out to the patient  Thank you

## 2022-03-18 ENCOUNTER — TRANSFERRED RECORDS (OUTPATIENT)
Dept: HEALTH INFORMATION MANAGEMENT | Facility: CLINIC | Age: 27
End: 2022-03-18
Payer: COMMERCIAL

## 2022-03-21 ENCOUNTER — LAB (OUTPATIENT)
Dept: LAB | Facility: CLINIC | Age: 27
End: 2022-03-21
Payer: COMMERCIAL

## 2022-03-21 DIAGNOSIS — R30.0 DYSURIA: ICD-10-CM

## 2022-03-21 LAB
ALBUMIN UR-MCNC: ABNORMAL MG/DL
APPEARANCE UR: CLEAR
BACTERIA #/AREA URNS HPF: ABNORMAL /HPF
BILIRUB UR QL STRIP: NEGATIVE
COLOR UR AUTO: YELLOW
GLUCOSE UR STRIP-MCNC: NEGATIVE MG/DL
HGB UR QL STRIP: NEGATIVE
KETONES UR STRIP-MCNC: NEGATIVE MG/DL
LEUKOCYTE ESTERASE UR QL STRIP: ABNORMAL
NITRATE UR QL: NEGATIVE
PH UR STRIP: 6 [PH] (ref 5–8)
RBC #/AREA URNS AUTO: ABNORMAL /HPF
SP GR UR STRIP: 1.02 (ref 1–1.03)
SQUAMOUS #/AREA URNS AUTO: ABNORMAL /LPF
UROBILINOGEN UR STRIP-ACNC: 0.2 E.U./DL
WBC #/AREA URNS AUTO: ABNORMAL /HPF

## 2022-03-21 PROCEDURE — 81001 URINALYSIS AUTO W/SCOPE: CPT

## 2022-03-21 PROCEDURE — 87086 URINE CULTURE/COLONY COUNT: CPT

## 2022-03-21 NOTE — RESULT ENCOUNTER NOTE
Mer, I am happy to say that the urine analysis does not show any white blood cells in the urine that would suggest infection.  Awaiting the results of the cultures and will obviously prescribe an antibiotic if it is needed.  We will keep you posted of the results as they become available.  Please let me know if you have questions  Guillermo Cramer M.D.

## 2022-03-23 LAB — BACTERIA UR CULT: NORMAL

## 2022-03-23 NOTE — RESULT ENCOUNTER NOTE
Left a voicemail message for the patient to inform her of her negative urine culture result.  The patient does have a follow-up office appointment with me on 4/11/2022  Guillermo Cramer MD FACOG

## 2022-03-25 ENCOUNTER — OFFICE VISIT (OUTPATIENT)
Dept: INTERNAL MEDICINE | Facility: CLINIC | Age: 27
End: 2022-03-25
Payer: COMMERCIAL

## 2022-03-25 VITALS
HEART RATE: 81 BPM | SYSTOLIC BLOOD PRESSURE: 136 MMHG | OXYGEN SATURATION: 99 % | BODY MASS INDEX: 34.93 KG/M2 | DIASTOLIC BLOOD PRESSURE: 80 MMHG | WEIGHT: 185 LBS | HEIGHT: 61 IN

## 2022-03-25 DIAGNOSIS — Z12.4 CERVICAL CANCER SCREENING: ICD-10-CM

## 2022-03-25 DIAGNOSIS — Z13.21 SCREENING FOR ENDOCRINE, NUTRITIONAL, METABOLIC AND IMMUNITY DISORDER: ICD-10-CM

## 2022-03-25 DIAGNOSIS — N34.2 URETHRITIS: ICD-10-CM

## 2022-03-25 DIAGNOSIS — N94.9 VAGINAL DISCOMFORT: ICD-10-CM

## 2022-03-25 DIAGNOSIS — Z13.29 SCREENING FOR ENDOCRINE, NUTRITIONAL, METABOLIC AND IMMUNITY DISORDER: ICD-10-CM

## 2022-03-25 DIAGNOSIS — Z00.00 ENCOUNTER FOR ANNUAL PHYSICAL EXAM: ICD-10-CM

## 2022-03-25 DIAGNOSIS — E66.812 CLASS 2 OBESITY DUE TO EXCESS CALORIES WITHOUT SERIOUS COMORBIDITY WITH BODY MASS INDEX (BMI) OF 35.0 TO 35.9 IN ADULT: ICD-10-CM

## 2022-03-25 DIAGNOSIS — Z11.51 SCREENING FOR HUMAN PAPILLOMAVIRUS: ICD-10-CM

## 2022-03-25 DIAGNOSIS — Z13.0 SCREENING FOR ENDOCRINE, NUTRITIONAL, METABOLIC AND IMMUNITY DISORDER: ICD-10-CM

## 2022-03-25 DIAGNOSIS — R17 ELEVATED BILIRUBIN: ICD-10-CM

## 2022-03-25 DIAGNOSIS — Z11.59 NEED FOR HEPATITIS C SCREENING TEST: ICD-10-CM

## 2022-03-25 DIAGNOSIS — Z13.228 SCREENING FOR ENDOCRINE, NUTRITIONAL, METABOLIC AND IMMUNITY DISORDER: ICD-10-CM

## 2022-03-25 DIAGNOSIS — N94.10 PAIN IN FEMALE GENITALIA ON INTERCOURSE: ICD-10-CM

## 2022-03-25 DIAGNOSIS — E66.09 CLASS 2 OBESITY DUE TO EXCESS CALORIES WITHOUT SERIOUS COMORBIDITY WITH BODY MASS INDEX (BMI) OF 35.0 TO 35.9 IN ADULT: ICD-10-CM

## 2022-03-25 LAB
ALBUMIN SERPL-MCNC: 3.9 G/DL (ref 3.5–5)
ALBUMIN UR-MCNC: 30 MG/DL
ALP SERPL-CCNC: 59 U/L (ref 45–120)
ALT SERPL W P-5'-P-CCNC: 22 U/L (ref 0–45)
ANION GAP SERPL CALCULATED.3IONS-SCNC: 11 MMOL/L (ref 5–18)
APPEARANCE UR: CLEAR
AST SERPL W P-5'-P-CCNC: 17 U/L (ref 0–40)
BACTERIA #/AREA URNS HPF: ABNORMAL /HPF
BASOPHILS # BLD AUTO: 0.1 10E3/UL (ref 0–0.2)
BASOPHILS NFR BLD AUTO: 1 %
BILIRUB SERPL-MCNC: 1.4 MG/DL (ref 0–1)
BILIRUB UR QL STRIP: NEGATIVE
BUN SERPL-MCNC: 10 MG/DL (ref 8–22)
CALCIUM SERPL-MCNC: 9 MG/DL (ref 8.5–10.5)
CHLORIDE BLD-SCNC: 107 MMOL/L (ref 98–107)
CHOLEST SERPL-MCNC: 201 MG/DL
CLUE CELLS: ABNORMAL
CO2 SERPL-SCNC: 21 MMOL/L (ref 22–31)
COLOR UR AUTO: YELLOW
CREAT SERPL-MCNC: 0.76 MG/DL (ref 0.6–1.1)
EOSINOPHIL # BLD AUTO: 0.2 10E3/UL (ref 0–0.7)
EOSINOPHIL NFR BLD AUTO: 2 %
ERYTHROCYTE [DISTWIDTH] IN BLOOD BY AUTOMATED COUNT: 13.8 % (ref 10–15)
FASTING STATUS PATIENT QL REPORTED: YES
GFR SERPL CREATININE-BSD FRML MDRD: >90 ML/MIN/1.73M2
GLUCOSE BLD-MCNC: 88 MG/DL (ref 70–125)
GLUCOSE UR STRIP-MCNC: NEGATIVE MG/DL
HCT VFR BLD AUTO: 40.2 % (ref 35–47)
HDLC SERPL-MCNC: 53 MG/DL
HGB BLD-MCNC: 13.4 G/DL (ref 11.7–15.7)
HGB UR QL STRIP: ABNORMAL
IMM GRANULOCYTES # BLD: 0 10E3/UL
IMM GRANULOCYTES NFR BLD: 0 %
KETONES UR STRIP-MCNC: NEGATIVE MG/DL
LDLC SERPL CALC-MCNC: 131 MG/DL
LEUKOCYTE ESTERASE UR QL STRIP: ABNORMAL
LYMPHOCYTES # BLD AUTO: 2.2 10E3/UL (ref 0.8–5.3)
LYMPHOCYTES NFR BLD AUTO: 29 %
MCH RBC QN AUTO: 27.1 PG (ref 26.5–33)
MCHC RBC AUTO-ENTMCNC: 33.3 G/DL (ref 31.5–36.5)
MCV RBC AUTO: 81 FL (ref 78–100)
MONOCYTES # BLD AUTO: 0.6 10E3/UL (ref 0–1.3)
MONOCYTES NFR BLD AUTO: 8 %
NEUTROPHILS # BLD AUTO: 4.4 10E3/UL (ref 1.6–8.3)
NEUTROPHILS NFR BLD AUTO: 60 %
NITRATE UR QL: NEGATIVE
PH UR STRIP: 6 [PH] (ref 5–8)
PLATELET # BLD AUTO: 227 10E3/UL (ref 150–450)
POTASSIUM BLD-SCNC: 3.8 MMOL/L (ref 3.5–5)
PROT SERPL-MCNC: 7.6 G/DL (ref 6–8)
RBC # BLD AUTO: 4.95 10E6/UL (ref 3.8–5.2)
RBC #/AREA URNS AUTO: ABNORMAL /HPF
SODIUM SERPL-SCNC: 139 MMOL/L (ref 136–145)
SP GR UR STRIP: >=1.03 (ref 1–1.03)
SQUAMOUS #/AREA URNS AUTO: ABNORMAL /LPF
TRICHOMONAS, WET PREP: ABNORMAL
TRIGL SERPL-MCNC: 86 MG/DL
UROBILINOGEN UR STRIP-ACNC: 0.2 E.U./DL
WBC # BLD AUTO: 7.4 10E3/UL (ref 4–11)
WBC #/AREA URNS AUTO: ABNORMAL /HPF
WBC'S/HIGH POWER FIELD, WET PREP: ABNORMAL
YEAST, WET PREP: ABNORMAL

## 2022-03-25 PROCEDURE — 87491 CHLMYD TRACH DNA AMP PROBE: CPT | Performed by: NURSE PRACTITIONER

## 2022-03-25 PROCEDURE — 99214 OFFICE O/P EST MOD 30 MIN: CPT | Mod: 25 | Performed by: NURSE PRACTITIONER

## 2022-03-25 PROCEDURE — 99395 PREV VISIT EST AGE 18-39: CPT | Performed by: NURSE PRACTITIONER

## 2022-03-25 PROCEDURE — 85025 COMPLETE CBC W/AUTO DIFF WBC: CPT | Performed by: NURSE PRACTITIONER

## 2022-03-25 PROCEDURE — 80053 COMPREHEN METABOLIC PANEL: CPT | Performed by: NURSE PRACTITIONER

## 2022-03-25 PROCEDURE — 81001 URINALYSIS AUTO W/SCOPE: CPT | Performed by: NURSE PRACTITIONER

## 2022-03-25 PROCEDURE — 80061 LIPID PANEL: CPT | Performed by: NURSE PRACTITIONER

## 2022-03-25 PROCEDURE — 87591 N.GONORRHOEAE DNA AMP PROB: CPT | Performed by: NURSE PRACTITIONER

## 2022-03-25 PROCEDURE — G0123 SCREEN CERV/VAG THIN LAYER: HCPCS | Performed by: NURSE PRACTITIONER

## 2022-03-25 PROCEDURE — 36415 COLL VENOUS BLD VENIPUNCTURE: CPT | Performed by: NURSE PRACTITIONER

## 2022-03-25 PROCEDURE — 87210 SMEAR WET MOUNT SALINE/INK: CPT | Performed by: NURSE PRACTITIONER

## 2022-03-25 NOTE — PATIENT INSTRUCTIONS
Your labs are processing at this time, I will release results once they are back.    Starting a daily probiotic to help reestablish the good sallie within your gut and vagina.    This could also mean eating a good Greek yogurt daily.    Continue over-the-counter Azo bladder relief as needed for the urethra issues.    Follow-up if having continued symptoms.  Patient Education     Bacterial Vaginosis    You have a vaginal infection called bacterial vaginosis (BV). Both good and bad bacteria are present in a healthy vagina. BV occurs when these bacteria get out of balance. The number of bad bacteria increase. And the number of good bacteria decrease. BV is linked with sexual activity, but it's not a sexually transmitted infection (STI).   BV may or may not cause symptoms. If symptoms do occur, they can include:     Thin, gray, milky-white, or sometimes green discharge    Unpleasant odor or  fishy  smell    Itching, burning, or pain in or around the vagina  It is not known what causes BV, but certain factors can make the problem more likely. These can include:     Douching    Spermicides    Use of antibiotics    Change in hormone levels with pregnancy, breastfeeding, or menopause    Having sex with a new partner    Having sex with more than one partner  BV will sometimes go away on its own. But treatment is often advised. This is because untreated BV can raise the risk of more serious health problems such as:     Pelvic inflammatory disease (PID)     delivery (giving birth to a baby early if you re pregnant)    HIV and some other sexually transmitted infections (STIs)    Infection after surgery on the reproductive organs  Home care  General care    BV is most often treated with medicines called antibiotics. These may be given as pills or as a vaginal cream. If antibiotics are prescribed, be sure to use them exactly as directed. And complete all of the medicine, even if your symptoms go away.    Don't douche or  having sex during treatment.    If you have sex with a female partner, ask your healthcare provider if she should also be treated.  Prevention    Don't douche.    Don't have sex. If you do have sex, then take steps to lower your risk:  ? Use condoms when having sex.  ? Limit the number of sex partners you have.    Follow-up care  Follow up with your healthcare provider, or as advised.   When to get medical advice  Call your healthcare provider right away if:     You have a fever of 100.4 F (38 C) or higher, or as directed by your provider.    Your symptoms get worse, or they don t go away within a few days of starting treatment.    You have new pain in the lower belly or pelvic region.    You have side effects that bother you or a reaction to the pills or cream you re prescribed.    You or any of your sex partners have new symptoms, such as a rash, joint pain, or sores.  [employer] last reviewed this educational content on 6/1/2020 2000-2021 [employer]. All rights reserved. This information is not intended as a substitute for professional medical care. Always follow your healthcare professional's instructions.
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
## 2022-03-26 LAB
C TRACH DNA SPEC QL NAA+PROBE: NEGATIVE
N GONORRHOEA DNA SPEC QL NAA+PROBE: NEGATIVE

## 2022-03-28 ENCOUNTER — MYC MEDICAL ADVICE (OUTPATIENT)
Dept: INTERNAL MEDICINE | Facility: CLINIC | Age: 27
End: 2022-03-28
Payer: COMMERCIAL

## 2022-03-28 RX ORDER — CLINDAMYCIN PHOSPHATE 20 MG/G
1 CREAM VAGINAL AT BEDTIME
Qty: 5 G | Refills: 0 | Status: SHIPPED | OUTPATIENT
Start: 2022-03-28 | End: 2022-04-04

## 2022-03-30 LAB
BKR LAB AP GYN ADEQUACY: NORMAL
BKR LAB AP GYN INTERPRETATION: NORMAL
BKR LAB AP HPV REFLEX: NORMAL
BKR LAB AP LMP: NORMAL
BKR LAB AP PREVIOUS ABNORMAL: NORMAL
PATH REPORT.COMMENTS IMP SPEC: NORMAL
PATH REPORT.COMMENTS IMP SPEC: NORMAL
PATH REPORT.RELEVANT HX SPEC: NORMAL

## 2022-03-31 ENCOUNTER — LAB (OUTPATIENT)
Dept: LAB | Facility: CLINIC | Age: 27
End: 2022-03-31
Payer: COMMERCIAL

## 2022-03-31 DIAGNOSIS — R35.0 URINARY FREQUENCY: ICD-10-CM

## 2022-03-31 LAB
ALBUMIN UR-MCNC: 30 MG/DL
APPEARANCE UR: CLEAR
BACTERIA #/AREA URNS HPF: ABNORMAL /HPF
BILIRUB UR QL STRIP: NEGATIVE
COLOR UR AUTO: YELLOW
GLUCOSE UR STRIP-MCNC: NEGATIVE MG/DL
HGB UR QL STRIP: ABNORMAL
KETONES UR STRIP-MCNC: NEGATIVE MG/DL
LEUKOCYTE ESTERASE UR QL STRIP: ABNORMAL
NITRATE UR QL: NEGATIVE
PH UR STRIP: 6.5 [PH] (ref 5–8)
RBC #/AREA URNS AUTO: ABNORMAL /HPF
SP GR UR STRIP: 1.02 (ref 1–1.03)
SQUAMOUS #/AREA URNS AUTO: ABNORMAL /LPF
UROBILINOGEN UR STRIP-ACNC: 0.2 E.U./DL
WBC #/AREA URNS AUTO: ABNORMAL /HPF

## 2022-03-31 PROCEDURE — 81001 URINALYSIS AUTO W/SCOPE: CPT

## 2022-03-31 PROCEDURE — 87086 URINE CULTURE/COLONY COUNT: CPT

## 2022-04-02 LAB — BACTERIA UR CULT: NORMAL

## 2022-04-04 ENCOUNTER — MYC MEDICAL ADVICE (OUTPATIENT)
Dept: INTERNAL MEDICINE | Facility: CLINIC | Age: 27
End: 2022-04-04
Payer: COMMERCIAL

## 2022-04-06 ENCOUNTER — LAB (OUTPATIENT)
Dept: LAB | Facility: CLINIC | Age: 27
End: 2022-04-06
Payer: COMMERCIAL

## 2022-04-06 DIAGNOSIS — R35.0 URINARY FREQUENCY: ICD-10-CM

## 2022-04-06 LAB
ALBUMIN UR-MCNC: 30 MG/DL
AMORPH CRY #/AREA URNS HPF: ABNORMAL /HPF
APPEARANCE UR: ABNORMAL
BILIRUB UR QL STRIP: NEGATIVE
COLOR UR AUTO: YELLOW
GLUCOSE UR STRIP-MCNC: NEGATIVE MG/DL
HGB UR QL STRIP: NEGATIVE
KETONES UR STRIP-MCNC: NEGATIVE MG/DL
LEUKOCYTE ESTERASE UR QL STRIP: NEGATIVE
NITRATE UR QL: NEGATIVE
PH UR STRIP: 6 [PH] (ref 5–8)
RBC #/AREA URNS AUTO: ABNORMAL /HPF
SP GR UR STRIP: 1.02 (ref 1–1.03)
UROBILINOGEN UR STRIP-ACNC: 0.2 E.U./DL
WBC #/AREA URNS AUTO: ABNORMAL /HPF

## 2022-04-06 PROCEDURE — 81001 URINALYSIS AUTO W/SCOPE: CPT

## 2022-04-06 PROCEDURE — 87086 URINE CULTURE/COLONY COUNT: CPT

## 2022-04-06 PROCEDURE — 87186 SC STD MICRODIL/AGAR DIL: CPT

## 2022-04-06 NOTE — RESULT ENCOUNTER NOTE
I will discuss this result with the patient when I see her in the office tomorrow  Guillermo Cramer MD FACOG

## 2022-04-07 ENCOUNTER — OFFICE VISIT (OUTPATIENT)
Dept: OBGYN | Facility: CLINIC | Age: 27
End: 2022-04-07
Payer: COMMERCIAL

## 2022-04-07 VITALS — DIASTOLIC BLOOD PRESSURE: 72 MMHG | SYSTOLIC BLOOD PRESSURE: 112 MMHG | BODY MASS INDEX: 35.34 KG/M2 | WEIGHT: 184 LBS

## 2022-04-07 DIAGNOSIS — R35.0 URINARY FREQUENCY: Primary | ICD-10-CM

## 2022-04-07 PROCEDURE — 99214 OFFICE O/P EST MOD 30 MIN: CPT | Performed by: OBSTETRICS & GYNECOLOGY

## 2022-04-07 RX ORDER — MIRABEGRON 50 MG/1
50 TABLET, EXTENDED RELEASE ORAL DAILY
Qty: 45 TABLET | Refills: 0 | Status: SHIPPED | OUTPATIENT
Start: 2022-04-07 | End: 2022-08-29

## 2022-04-07 NOTE — NURSING NOTE
"Chief Complaint   Patient presents with     Results       Initial /72   Wt 83.5 kg (184 lb)   BMI 35.34 kg/m   Estimated body mass index is 35.34 kg/m  as calculated from the following:    Height as of 3/25/22: 1.537 m (5' 0.5\").    Weight as of this encounter: 83.5 kg (184 lb).  BP completed using cuff size: regular    Questioned patient about current smoking habits.  Pt. has never smoked.          The following HM Due: NONE      The following patient reported/Care Every where data was sent to:  P ABSTRACT QUALITY INITIATIVES [18127]        Dawn Lay CMA                 "

## 2022-04-07 NOTE — Clinical Note
Guillermo Cramer M.D. [address] [id number] [phone] phone  [phone] fax       Dear colleagues,         Thank you for the opportunity to see your patient. Please see my office visit notes for your review.  As always, I appreciate the opportunity to participate in your patient's care.     Sincerely yours,    Guillermo Cramer M.D.

## 2022-04-08 NOTE — PROGRESS NOTES
HPI:  Mer Gonzalez is a 27 year old white female  No LMP recorded.  Condoms for contraception, who presents for follow-up of lower abdominal pelvic suprapubic discomfort urgency frequency dysuria and pelvic pressure.  The patient has had an extensive work-up to date including a thorough work-up for microhematuria including a normal cystoscopy and [facility] negative CT urogram and extensive urinalysis urine cultures.  At this point the working diagnosis is benign asymptomatic hematuria.  The patient is also been doing biofeedback in an effort to improve her urgency frequency.  She has had 3 sessions and she said it helps for a day or 2 but then is less effective.  I discussed with her today that biofeedback and pelvic floor training is a long-term process something that needs to be done on a daily basis not a once or twice then.  I reviewed her recent urine analysis and culture results as outlined in the chart.  I had a lengthy discussion with the patient regarding urgency frequency and the risk benefits and alternative forms of therapy.  I gave her a flow diagram.  We discussed fluid management timed voidings elimination of bladder irritants.  The patient's not had a urodynamic study done.  She has had a trial of Detrol which the patient said makes her symptoms worse.  We discussed my preference for trying a second antimuscarinic agent if that does not work considering going to advanced therapies.  In light of her desire for future pregnancy Botox may be a good option for her.    No past medical history on file.  Past Surgical History:   Procedure Laterality Date     TONSILLECTOMY       WISDOM TOOTH EXTRACTION       Family History   Problem Relation Age of Onset     Kidney Cancer Father         age 48     Allergic rhinitis Sister      Social History     Socioeconomic History     Marital status: Single     Spouse name: Not on file     Number of children: Not on file      Years of education: Not on file     Highest education level: Not on file   Occupational History     Not on file   Tobacco Use     Smoking status: Never Smoker     Smokeless tobacco: Never Used   Vaping Use     Vaping Use: Never used   Substance and Sexual Activity     Alcohol use: Not Currently     Drug use: Never     Sexual activity: Yes     Partners: Male     Birth control/protection: Condom   Other Topics Concern     Not on file   Social History Narrative     Not on file     Social Determinants of Health     Financial Resource Strain: Not on file   Food Insecurity: Not on file   Transportation Needs: Not on file   Physical Activity: Not on file   Stress: Not on file   Social Connections: Not on file   Intimate Partner Violence: Not on file   Housing Stability: Not on file       Allergies:  Amoxicillin, Sulfamethoxazole-trimethoprim [sulfamethoxazole w/trimethoprim], and Latex    Current Outpatient Medications   Medication Sig Dispense Refill     albuterol (PROAIR HFA/PROVENTIL HFA/VENTOLIN HFA) 108 (90 Base) MCG/ACT inhaler Inhale 2 puffs into the lungs        mirabegron (MYRBETRIQ) 50 MG 24 hr tablet Take 1 tablet (50 mg) by mouth daily 45 tablet 0     valACYclovir (VALTREX) 500 MG tablet take 1 tablet 2 times daily Orally for  3 days         Review Of Systems   ROS: 10 point ROS neg other than the symptoms noted above in the HPI.    Exam:  /72   Wt 83.5 kg (184 lb)   BMI 35.34 kg/m    {Constitutional: healthy, alert and mild distress    Assessment/Plan:  (R35.0) Urinary frequency  (primary encounter diagnosis)  Comment: Risks, benefits, and alternative modes of therapy discussed at length. Pathophysiology of the disease process reviewed, all of the patients questions answered and informed consent obtained.    Plan: mirabegron (MYRBETRIQ) 50 MG 24 hr tablet,         CANCELED: Urine Culture, CANCELED: UA with         Microscopic - lab collect, CANCELED: UA with         Microscopic        At this point  would like to begin a trial of Myrbetriq 50 mg daily.  I have asked the patient to keep a voiding diary for 2 days now begin the Myrbetriq and beyond for a period of a month and then repeat the voiding diary for 2 days.  I like to see her back after that month for follow-up.  Patient also does have a follow-up appointment with [facility].  I have advised that it may be helpful to have 1 person managing her care to avoid confusion.  At this point my thoughts are if antimuscarinic therapy does not work I consider going to advanced therapies we discussed the option of Botox versus neuro sacral modulation.  A detailed written outline was given.  25 minutes spent in patient interview chart review documentation and care plan formulation      Guillermo Cramer M.D.

## 2022-04-08 NOTE — PATIENT INSTRUCTIONS
You can reach your [facility] Care Team any time of the day by calling [phone]. This number will put you in touch with the 24 hour nurse line if the clinic is closed.    To contact your OB/GYN Surgery Scheduler please call [phone]. This is a direct number for your care team between 8 a.m. and 4 p.m. Monday through Friday.    [facility] is open for your convenience: [phone]  Monday through Friday 8 a.m. to 8:30 p.m.  Saturday 9 a.m. to 6 p.m.  Sunday Noon to 6 p.m.    They are closed on all major holidays.

## 2022-04-09 LAB — BACTERIA UR CULT: ABNORMAL

## 2022-04-11 ENCOUNTER — TELEPHONE (OUTPATIENT)
Dept: OBGYN | Facility: CLINIC | Age: 27
End: 2022-04-11

## 2022-04-11 DIAGNOSIS — N39.0 RECURRENT UTI: Primary | ICD-10-CM

## 2022-04-11 DIAGNOSIS — N32.81 URGENCY-FREQUENCY SYNDROME: Primary | ICD-10-CM

## 2022-04-11 RX ORDER — VIBEGRON 75 MG/1
75 TABLET, FILM COATED ORAL DAILY
Qty: 45 TABLET | Refills: 0 | Status: SHIPPED | OUTPATIENT
Start: 2022-04-11 | End: 2022-05-26

## 2022-04-11 RX ORDER — CIPROFLOXACIN 500 MG/1
500 TABLET, FILM COATED ORAL 2 TIMES DAILY
Qty: 6 TABLET | Refills: 0 | Status: SHIPPED | OUTPATIENT
Start: 2022-04-11 | End: 2022-04-14

## 2022-04-11 NOTE — RESULT ENCOUNTER NOTE
Please see my telephone visit notes regarding treatment of this UTI with Cipro 500 mg p.o. twice daily for 3 days because of the recurrent nature of her infection and symptomatology

## 2022-04-11 NOTE — TELEPHONE ENCOUNTER
The patient's insurance company would not cover Myrbetriq but they will cover Darrell Amirah a similar drug in the same treatment category.  Please inform the patient that I sent a prescription in to her listed pharmacy for Darrell Efraín 75 mg orally once daily.  I sent a prescription for 45 tablets.  I would like her to keep the voiding diary and proceed with the plan that we outlined at her most recent office visit.  Please let me know if she has any questions

## 2022-04-11 NOTE — TELEPHONE ENCOUNTER
I reviewed the patient's most recent UC showing 50,000 colonies of E. coli.  In light of her history of recurring infections Jenniffer treat her with Cipro 500 mg p.o. twice daily for 3 days.  The patient will let me know if there is no improvement of her symptoms.  I would await the results of this before addressing vaginitis concerns  Please notify the patient

## 2022-04-12 ENCOUNTER — TELEPHONE (OUTPATIENT)
Dept: OBGYN | Facility: CLINIC | Age: 27
End: 2022-04-12
Payer: COMMERCIAL

## 2022-04-12 NOTE — TELEPHONE ENCOUNTER
I spoke to the patient today and addressed the concerns that she had regarding a prescription that I wrote for Cipro 500 mg p.o. twice daily for 3 days for recurring urinary tract infections and urgency frequency syndrome.  The patient states that when she went to the [facility] pharmacy in [place] the pharmacist discussed potentially dangerous side effects from this medication to the point where the patient did not take the medication and was uncomfortable in proceeding with it.  I spoke to the patient about Cipro and the rationale for prescribing this medication.  I have also called the pharmacy at the [facility] in [place] and asked to speak to a pharmacy supervisor to express my concerns about information that the patient was given.  The patient's perception was this was a dangerous medication.  After my discussion with her she is going to take her prescription and will follow up as per our earlier office plan

## 2022-04-25 ENCOUNTER — MYC MEDICAL ADVICE (OUTPATIENT)
Dept: INTERNAL MEDICINE | Facility: CLINIC | Age: 27
End: 2022-04-25
Payer: COMMERCIAL

## 2022-04-25 DIAGNOSIS — R05.9 COUGH: Primary | ICD-10-CM

## 2022-04-27 RX ORDER — ALBUTEROL SULFATE 90 UG/1
2 AEROSOL, METERED RESPIRATORY (INHALATION) EVERY 4 HOURS PRN
Qty: 18 G | Refills: 3 | Status: SHIPPED | OUTPATIENT
Start: 2022-04-27 | End: 2022-08-29

## 2022-05-02 ENCOUNTER — TRANSFERRED RECORDS (OUTPATIENT)
Dept: HEALTH INFORMATION MANAGEMENT | Facility: CLINIC | Age: 27
End: 2022-05-02
Payer: COMMERCIAL

## 2022-05-05 ENCOUNTER — TRANSFERRED RECORDS (OUTPATIENT)
Dept: HEALTH INFORMATION MANAGEMENT | Facility: CLINIC | Age: 27
End: 2022-05-05
Payer: COMMERCIAL

## 2022-08-25 ENCOUNTER — APPOINTMENT (OUTPATIENT)
Dept: CT IMAGING | Facility: CLINIC | Age: 27
End: 2022-08-25
Attending: EMERGENCY MEDICINE
Payer: COMMERCIAL

## 2022-08-25 ENCOUNTER — HOSPITAL ENCOUNTER (EMERGENCY)
Facility: CLINIC | Age: 27
Discharge: HOME | End: 2022-08-25
Attending: EMERGENCY MEDICINE | Admitting: EMERGENCY MEDICINE
Payer: COMMERCIAL

## 2022-08-25 ENCOUNTER — OFFICE VISIT (OUTPATIENT)
Dept: FAMILY MEDICINE | Facility: CLINIC | Age: 27
End: 2022-08-25
Payer: COMMERCIAL

## 2022-08-25 VITALS
RESPIRATION RATE: 16 BRPM | SYSTOLIC BLOOD PRESSURE: 157 MMHG | OXYGEN SATURATION: 96 % | DIASTOLIC BLOOD PRESSURE: 114 MMHG | TEMPERATURE: 98.3 F | HEART RATE: 71 BPM

## 2022-08-25 VITALS
OXYGEN SATURATION: 98 % | RESPIRATION RATE: 18 BRPM | DIASTOLIC BLOOD PRESSURE: 89 MMHG | TEMPERATURE: 98.3 F | WEIGHT: 180 LBS | HEART RATE: 76 BPM | SYSTOLIC BLOOD PRESSURE: 143 MMHG | BODY MASS INDEX: 34.58 KG/M2

## 2022-08-25 DIAGNOSIS — R03.0 ELEVATED BLOOD PRESSURE READING WITHOUT DIAGNOSIS OF HYPERTENSION: Primary | ICD-10-CM

## 2022-08-25 DIAGNOSIS — R03.0 ELEVATED BLOOD PRESSURE READING WITHOUT DIAGNOSIS OF HYPERTENSION: ICD-10-CM

## 2022-08-25 DIAGNOSIS — R51.9 ACUTE NONINTRACTABLE HEADACHE, UNSPECIFIED HEADACHE TYPE: ICD-10-CM

## 2022-08-25 LAB
ALBUMIN UR-MCNC: NEGATIVE MG/DL
ANION GAP SERPL CALCULATED.3IONS-SCNC: 8 MMOL/L (ref 7–15)
APPEARANCE UR: CLEAR
BACTERIA #/AREA URNS HPF: ABNORMAL /HPF
BILIRUB UR QL STRIP: NEGATIVE
BUN SERPL-MCNC: 12.8 MG/DL (ref 6–20)
CALCIUM SERPL-MCNC: 9.2 MG/DL (ref 8.6–10)
CHLORIDE SERPL-SCNC: 103 MMOL/L (ref 98–107)
COLOR UR AUTO: COLORLESS
CREAT SERPL-MCNC: 0.65 MG/DL (ref 0.51–0.95)
DEPRECATED HCO3 PLAS-SCNC: 25 MMOL/L (ref 22–29)
GFR SERPL CREATININE-BSD FRML MDRD: >90 ML/MIN/1.73M2
GLUCOSE SERPL-MCNC: 96 MG/DL (ref 70–99)
GLUCOSE UR STRIP-MCNC: NEGATIVE MG/DL
HCG UR QL: NEGATIVE
HGB UR QL STRIP: NEGATIVE
HOLD SPECIMEN: NORMAL
KETONES UR STRIP-MCNC: NEGATIVE MG/DL
LEUKOCYTE ESTERASE UR QL STRIP: ABNORMAL
NITRATE UR QL: NEGATIVE
PH UR STRIP: 6 [PH] (ref 5–7)
POTASSIUM SERPL-SCNC: 4.3 MMOL/L (ref 3.4–5.3)
RBC URINE: 2 /HPF
SODIUM SERPL-SCNC: 136 MMOL/L (ref 136–145)
SP GR UR STRIP: 1.01 (ref 1–1.03)
SQUAMOUS EPITHELIAL: 2 /HPF
UROBILINOGEN UR STRIP-MCNC: <2 MG/DL
WBC URINE: 16 /HPF

## 2022-08-25 PROCEDURE — 250N000011 HC RX IP 250 OP 636: Performed by: EMERGENCY MEDICINE

## 2022-08-25 PROCEDURE — 70450 CT HEAD/BRAIN W/O DYE: CPT

## 2022-08-25 PROCEDURE — 80048 BASIC METABOLIC PNL TOTAL CA: CPT | Performed by: FAMILY MEDICINE

## 2022-08-25 PROCEDURE — 36415 COLL VENOUS BLD VENIPUNCTURE: CPT | Performed by: FAMILY MEDICINE

## 2022-08-25 PROCEDURE — 81025 URINE PREGNANCY TEST: CPT | Performed by: EMERGENCY MEDICINE

## 2022-08-25 PROCEDURE — 81001 URINALYSIS AUTO W/SCOPE: CPT | Performed by: EMERGENCY MEDICINE

## 2022-08-25 PROCEDURE — 87086 URINE CULTURE/COLONY COUNT: CPT | Performed by: EMERGENCY MEDICINE

## 2022-08-25 PROCEDURE — 99285 EMERGENCY DEPT VISIT HI MDM: CPT | Mod: 25

## 2022-08-25 PROCEDURE — 258N000003 HC RX IP 258 OP 636: Performed by: EMERGENCY MEDICINE

## 2022-08-25 PROCEDURE — 96361 HYDRATE IV INFUSION ADD-ON: CPT

## 2022-08-25 PROCEDURE — 96375 TX/PRO/DX INJ NEW DRUG ADDON: CPT

## 2022-08-25 PROCEDURE — 99214 OFFICE O/P EST MOD 30 MIN: CPT | Performed by: FAMILY MEDICINE

## 2022-08-25 PROCEDURE — 96374 THER/PROPH/DIAG INJ IV PUSH: CPT

## 2022-08-25 RX ORDER — KETOROLAC TROMETHAMINE 15 MG/ML
15 INJECTION, SOLUTION INTRAMUSCULAR; INTRAVENOUS ONCE
Status: COMPLETED | OUTPATIENT
Start: 2022-08-25 | End: 2022-08-25

## 2022-08-25 RX ORDER — DIPHENHYDRAMINE HYDROCHLORIDE 50 MG/ML
25 INJECTION INTRAMUSCULAR; INTRAVENOUS ONCE
Status: COMPLETED | OUTPATIENT
Start: 2022-08-25 | End: 2022-08-25

## 2022-08-25 RX ORDER — IBUPROFEN 200 MG
200 TABLET ORAL EVERY 4 HOURS PRN
COMMUNITY
End: 2022-09-21

## 2022-08-25 RX ADMIN — PROCHLORPERAZINE EDISYLATE 5 MG: 5 INJECTION INTRAMUSCULAR; INTRAVENOUS at 20:36

## 2022-08-25 RX ADMIN — SODIUM CHLORIDE 500 ML: 9 INJECTION, SOLUTION INTRAVENOUS at 20:33

## 2022-08-25 RX ADMIN — DIPHENHYDRAMINE HYDROCHLORIDE 25 MG: 50 INJECTION, SOLUTION INTRAMUSCULAR; INTRAVENOUS at 20:35

## 2022-08-25 RX ADMIN — KETOROLAC TROMETHAMINE 15 MG: 15 INJECTION, SOLUTION INTRAMUSCULAR; INTRAVENOUS at 20:34

## 2022-08-25 ASSESSMENT — ACTIVITIES OF DAILY LIVING (ADL): ADLS_ACUITY_SCORE: 35

## 2022-08-25 NOTE — PROGRESS NOTES
Assessment:       Elevated blood pressure reading without diagnosis of hypertension  - Basic metabolic panel  (Ca, Cl, CO2, Creat, Gluc, K, Na, BUN)         Plan:     With elevated blood pressure without significant systemic symptoms.  We will check a BMP.  Recommend she monitor her blood pressures at home and follow-up with her PCP within the next week or so to address her elevated blood pressures if they are still continuing to be high.  Follow-up in ED if developing significantly worsening headache, chest pain, shortness of breath, lightheadedness, dizziness, exertional intolerance, vision changes, or tinnitus.    MEDICATIONS:   Orders Placed This Encounter   Medications     ibuprofen (ADVIL/MOTRIN) 200 MG tablet     Sig: Take 200 mg by mouth every 4 hours as needed for mild pain     Phenylephrine-Acetaminophen (TYLENOL SINUS CONGESTION/PAIN PO)       Patient Instructions   Keep your appointment Monday for follow up on your blood pressure.  Continue with healthy food choices, avoid processed foods and added salt, drink lots of water.  Avoid caffeine.      We will let you know the results of your blood work when we get it back.      Subjective:       27 year old female presents for evaluation of elevated blood pressure that was noted when she was at her physical at [facility].  Blood pressure was 154/104.  Over the past several days she has had some mild headaches and not quite feeling herself but denies any blurry vision, ringing in her ears, chest pain, shortness of breath, exertional intolerance, or any other concerning symptoms.  No prior issues with her blood pressure.    Patient Active Problem List   Diagnosis     Migraine without aura and without status migrainosus, not intractable     Scoliosis       No past medical history on file.    Past Surgical History:   Procedure Laterality Date     TONSILLECTOMY       WISDOM TOOTH EXTRACTION  2011       Current Outpatient Medications   Medication      ibuprofen (ADVIL/MOTRIN) 200 MG tablet     Phenylephrine-Acetaminophen (TYLENOL SINUS CONGESTION/PAIN PO)     albuterol (PROAIR HFA/PROVENTIL HFA/VENTOLIN HFA) 108 (90 Base) MCG/ACT inhaler     mirabegron (MYRBETRIQ) 50 MG 24 hr tablet     valACYclovir (VALTREX) 500 MG tablet     No current facility-administered medications for this visit.       Allergies   Allergen Reactions     Amoxicillin Hives     Sulfamethoxazole-Trimethoprim [Sulfamethoxazole W/Trimethoprim] Unknown     Latex Hives, Itching, Rash and Swelling       Family History   Problem Relation Age of Onset     Kidney Cancer Father         age 48     Allergic rhinitis Sister        Social History     Socioeconomic History     Marital status: Single     Spouse name: None     Number of children: None     Years of education: None     Highest education level: None   Tobacco Use     Smoking status: Never Smoker     Smokeless tobacco: Never Used   Vaping Use     Vaping Use: Never used   Substance and Sexual Activity     Alcohol use: Not Currently     Drug use: Never     Sexual activity: Yes     Partners: Male     Birth control/protection: Condom         Review of Systems  Pertinent items are noted in HPI.      Objective:                   General Appearance:    BP (!) 157/114   Pulse 71   Temp 98.3  F (36.8  C)   Resp 16   LMP 08/15/2022   SpO2 96%   Breastfeeding No         Alert, pleasant, cooperative, no distress, appears stated age   Head:    Normocephalic, without obvious abnormality, atraumatic   Eyes:    Conjunctiva/corneas clear   Ears:    Normal TM's without erythema or bulging. Normal external ear canals, both ears   Nose:   Nares normal, septum midline, mucosa normal, no drainage    or sinus tenderness   Throat:   Lips, mucosa, and tongue normal; teeth and gums normal.  No tonsilar hypertrophy or exudate.   Neck:   Supple, symmetrical, trachea midline, no adenopathy    Lungs:     Clear to auscultation bilaterally without wheezes, rales, or  rhonchi, respirations unlabored    Heart:    Regular rate and rhythm, S1 and S2 normal, no murmur, rub or gallop       Extremities:   Extremities normal, atraumatic, no cyanosis or edema   Skin:   Skin color, texture, turgor normal, no rashes or lesions

## 2022-08-26 NOTE — ED TRIAGE NOTES
Reports intermittent frontal headache for the last week. Was seen in clinic today and found to have elevated BP, reports 150s systolic. No hx HTN.

## 2022-08-26 NOTE — ED PROVIDER NOTES
EMERGENCY DEPARTMENT ENCOUNTER      NAME: Mer Gonzalez  AGE: 27 year old female  YOB: 1995  MRN: [id number]  EVALUATION DATE & TIME: 2022  8:01 PM    PCP: Lesly Emery    ED PROVIDER: Jose F Blevins M.D.      Chief Complaint   Patient presents with     Hypertension       FINAL IMPRESSION:  1. Acute nonintractable headache, unspecified headache type    2. Elevated blood pressure reading without diagnosis of hypertension        ED COURSE & MEDICAL DECISION MAKIN year old female presents to the Emergency Department for evaluation of concerns including headache and elevated blood pressure.  She had waxing and waning headache, positional, for the last week.  She is neurologically intact here.  She is newly hypertensive as high as 180 systolic, largely improved but not resolved after analgesics for headache.  Noncontrast head CT negative for any acute intracranial process, mass lesion, hemorrhage.  Based on her stable exam, I do not think she would require further work-up of other imaging modality or lumbar puncture.  She received complete relief with low-dose Compazine Benadryl and Toradol.  She was resting comfortably on reevaluation.  There was some nonspecific white matter changes, I wonder if this would relate to hypertension given it would be relatively unexpected in a 27-year-old.  The rest of her cardiopulmonary exam is unremarkable.  She did have some pyuria but does not report any symptoms, this test was ordered from triage, I think the urine culture can be followed but I would not treat for what is probably contamination.  Pregnancy test is negative.  Her blood pressure improved but remain mildly elevated after analgesics for headache.  We discussed the importance of clinic follow-up to review any ongoing symptoms.  She will keep a journal of any recurrent headaches.  She was discharged in good condition.    At the conclusion of the encounter I discussed the results of all  "of the tests and the disposition. The questions were answered. The patient or family acknowledged understanding and was agreeable with the care plan.       MEDICATIONS GIVEN IN THE EMERGENCY:  Medications   prochlorperazine (COMPAZINE) injection 5 mg (5 mg Intravenous Given 8/25/22 2036)   diphenhydrAMINE (BENADRYL) injection 25 mg (25 mg Intravenous Given 8/25/22 2035)   0.9% sodium chloride BOLUS (0 mLs Intravenous Stopped 8/25/22 2126)   ketorolac (TORADOL) injection 15 mg (15 mg Intravenous Given 8/25/22 2034)       NEW PRESCRIPTIONS STARTED AT TODAY'S ER VISIT  Discharge Medication List as of 8/25/2022  9:27 PM             =================================================================    HPI    Patient information was obtained from: Patient    Use of : N/A         Mer Gonzalez is a 27 year old female who presents to this ED via walk-in for evaluation of hypertension.    Patient reports head pressure for 1 week with intermittent blurred vision and a \"dazed feeling.\" She was seen in clinic today and found to have high blood pressure, also endorses a new headache, 6/10. She denies any history of similar symptoms, history of hypertension, and does not take any medications. She took ibuprofen today at 11 AM and tylenol at 3 PM.     Patient endorses headache, hypertension, intermittent blurred vision, intermittent dazed feeling. Patient denies vomit, confusion, difficulty walking, urinary symptoms and all other relevant symptoms.      REVIEW OF SYSTEMS   All systems reviewed and negative except as noted in HPI.    PAST MEDICAL HISTORY:  History reviewed. No pertinent past medical history.    PAST SURGICAL HISTORY:  Past Surgical History:   Procedure Laterality Date     TONSILLECTOMY       WISDOM TOOTH EXTRACTION  2011           CURRENT MEDICATIONS:    No current facility-administered medications for this encounter.     Current Outpatient Medications   Medication     albuterol (PROAIR HFA/PROVENTIL " HFA/VENTOLIN HFA) 108 (90 Base) MCG/ACT inhaler     ibuprofen (ADVIL/MOTRIN) 200 MG tablet     mirabegron (MYRBETRIQ) 50 MG 24 hr tablet     Phenylephrine-Acetaminophen (TYLENOL SINUS CONGESTION/PAIN PO)     valACYclovir (VALTREX) 500 MG tablet         ALLERGIES:  Allergies   Allergen Reactions     Amoxicillin Hives     Sulfamethoxazole-Trimethoprim [Sulfamethoxazole W/Trimethoprim] Unknown     Latex Hives, Itching, Rash and Swelling       FAMILY HISTORY:  Family History   Problem Relation Age of Onset     Kidney Cancer Father         age 48     Allergic rhinitis Sister        SOCIAL HISTORY:   Social History     Socioeconomic History     Marital status: Single   Tobacco Use     Smoking status: Never Smoker     Smokeless tobacco: Never Used   Vaping Use     Vaping Use: Never used   Substance and Sexual Activity     Alcohol use: Not Currently     Drug use: Never     Sexual activity: Yes     Partners: Male     Birth control/protection: Condom       VITALS:  BP (!) 143/89   Pulse 76   Temp 98.3  F (36.8  C) (Oral)   Resp 18   Wt 81.6 kg (180 lb)   LMP 08/15/2022   SpO2 98%   BMI 34.58 kg/m      PHYSICAL EXAM    Constitutional: Well developed, Well nourished, NAD.  HENT: Normocephalic, Atraumatic. Neck Supple.  Eyes: EOMI, Conjunctiva normal.  Respiratory: Breathing comfortably on room air. Speaks full sentences easily. Lungs clear to ascultation.  Cardiovascular: Normal heart rate, Regular rhythm. No peripheral edema.  Abdomen: Soft, nontender  Musculoskeletal: Good range of motion in all major joints. No major deformities noted.  Integument: Warm, Dry.  Neurologic: Fully awake, alert, oriented.  Face is symmetric.  Speech is normal.  Cranial nerves II through XII intact.  Strength is 5 out of 5 throughout bilateral upper and lower extremities.  Sensation to light touch intact x4.  Patient is ambulatory.  Psychiatric: Cooperative. Affect appropriate.     LAB:  All pertinent labs reviewed and interpreted.  Labs  Ordered and Resulted from Time of ED Arrival to Time of ED Departure   ROUTINE UA WITH MICROSCOPIC REFLEX TO CULTURE - Abnormal       Result Value    Color Urine Colorless      Appearance Urine Clear      Glucose Urine Negative      Bilirubin Urine Negative      Ketones Urine Negative      Specific Gravity Urine 1.008      Blood Urine Negative      pH Urine 6.0      Protein Albumin Urine Negative      Urobilinogen Urine <2.0      Nitrite Urine Negative      Leukocyte Esterase Urine 250 Devendra/uL (*)     Bacteria Urine Few (*)     RBC Urine 2      WBC Urine 16 (*)     Squamous Epithelials Urine 2 (*)    HCG QUALITATIVE URINE - Normal    hCG Urine Qualitative Negative     URINE CULTURE       RADIOLOGY:  Reviewed all pertinent imaging. Please see official radiology report.  Head CT w/o contrast   Final Result   IMPRESSION:     1.  No evidence of acute intracranial hemorrhage or mass effect.   2.  Mild nonspecific white matter changes.            I, Lui Mancini, am serving as a scribe to document services personally performed by Dr. Jose F Blevins, based on my observation and the provider's statements to me. I, Jose F Blevins MD attest that Lui Mancini is acting in a scribe capacity, has observed my performance of the services and has documented them in accordance with my direction.    Jose F Blevins M.D.  Emergency Medicine  [facility] EMERGENCY ROOM  [address] [id number] [phone]  Dept: [phone]      Jose F Blevins MD  08/25/22 2948

## 2022-08-26 NOTE — DISCHARGE INSTRUCTIONS
You were seen in the emergency department at [facility] for headache.  Your evaluation today included a head CT which was negative for anything like brain mass or bleeding.  You were treated with IV headache medications, we are glad that you are feeling improved.  This alone resulted in improvement in your elevated blood pressure however it remains slightly elevated and will require attention on follow-up with your primary doctor.  You can continue using Tylenol and ibuprofen at home for any continued headaches.  Please keep a log of any further headache symptoms and review this with your primary doctor.  Follow-up in a week as you have planned to continue your evaluation

## 2022-08-27 ENCOUNTER — NURSE TRIAGE (OUTPATIENT)
Dept: NURSING | Facility: CLINIC | Age: 27
End: 2022-08-27

## 2022-08-27 LAB
BACTERIA UR CULT: ABNORMAL
BACTERIA UR CULT: ABNORMAL

## 2022-08-28 ENCOUNTER — APPOINTMENT (OUTPATIENT)
Dept: RADIOLOGY | Facility: CLINIC | Age: 27
End: 2022-08-28
Attending: EMERGENCY MEDICINE
Payer: COMMERCIAL

## 2022-08-28 ENCOUNTER — HOSPITAL ENCOUNTER (EMERGENCY)
Facility: CLINIC | Age: 27
Discharge: HOME | End: 2022-08-28
Attending: EMERGENCY MEDICINE | Admitting: EMERGENCY MEDICINE
Payer: COMMERCIAL

## 2022-08-28 VITALS
WEIGHT: 180 LBS | OXYGEN SATURATION: 98 % | DIASTOLIC BLOOD PRESSURE: 96 MMHG | HEART RATE: 76 BPM | SYSTOLIC BLOOD PRESSURE: 146 MMHG | TEMPERATURE: 98.3 F | RESPIRATION RATE: 16 BRPM | BODY MASS INDEX: 34.58 KG/M2

## 2022-08-28 DIAGNOSIS — R00.2 PALPITATIONS: ICD-10-CM

## 2022-08-28 LAB
ANION GAP SERPL CALCULATED.3IONS-SCNC: 8 MMOL/L (ref 5–18)
ATRIAL RATE - MUSE: 85 BPM
BASOPHILS # BLD AUTO: 0.1 10E3/UL (ref 0–0.2)
BASOPHILS NFR BLD AUTO: 1 %
BUN SERPL-MCNC: 11 MG/DL (ref 8–22)
CALCIUM SERPL-MCNC: 9.5 MG/DL (ref 8.5–10.5)
CHLORIDE BLD-SCNC: 106 MMOL/L (ref 98–107)
CO2 SERPL-SCNC: 25 MMOL/L (ref 22–31)
CREAT SERPL-MCNC: 0.68 MG/DL (ref 0.6–1.1)
DIASTOLIC BLOOD PRESSURE - MUSE: 105 MMHG
EOSINOPHIL # BLD AUTO: 0.2 10E3/UL (ref 0–0.7)
EOSINOPHIL NFR BLD AUTO: 2 %
ERYTHROCYTE [DISTWIDTH] IN BLOOD BY AUTOMATED COUNT: 14.2 % (ref 10–15)
GFR SERPL CREATININE-BSD FRML MDRD: >90 ML/MIN/1.73M2
GLUCOSE BLD-MCNC: 94 MG/DL (ref 70–125)
HCT VFR BLD AUTO: 37 % (ref 35–47)
HGB BLD-MCNC: 12.3 G/DL (ref 11.7–15.7)
HOLD SPECIMEN: NORMAL
IMM GRANULOCYTES # BLD: 0 10E3/UL
IMM GRANULOCYTES NFR BLD: 0 %
INTERPRETATION ECG - MUSE: NORMAL
LYMPHOCYTES # BLD AUTO: 3.1 10E3/UL (ref 0.8–5.3)
LYMPHOCYTES NFR BLD AUTO: 33 %
MAGNESIUM SERPL-MCNC: 2 MG/DL (ref 1.8–2.6)
MCH RBC QN AUTO: 26.6 PG (ref 26.5–33)
MCHC RBC AUTO-ENTMCNC: 33.2 G/DL (ref 31.5–36.5)
MCV RBC AUTO: 80 FL (ref 78–100)
MONOCYTES # BLD AUTO: 0.9 10E3/UL (ref 0–1.3)
MONOCYTES NFR BLD AUTO: 9 %
NEUTROPHILS # BLD AUTO: 5.1 10E3/UL (ref 1.6–8.3)
NEUTROPHILS NFR BLD AUTO: 55 %
NRBC # BLD AUTO: 0 10E3/UL
NRBC BLD AUTO-RTO: 0 /100
P AXIS - MUSE: 12 DEGREES
PLATELET # BLD AUTO: 284 10E3/UL (ref 150–450)
POTASSIUM BLD-SCNC: 3.4 MMOL/L (ref 3.5–5)
PR INTERVAL - MUSE: 116 MS
QRS DURATION - MUSE: 82 MS
QT - MUSE: 368 MS
QTC - MUSE: 437 MS
R AXIS - MUSE: 56 DEGREES
RBC # BLD AUTO: 4.62 10E6/UL (ref 3.8–5.2)
SODIUM SERPL-SCNC: 139 MMOL/L (ref 136–145)
SYSTOLIC BLOOD PRESSURE - MUSE: 182 MMHG
T AXIS - MUSE: 25 DEGREES
TROPONIN I SERPL-MCNC: <0.01 NG/ML (ref 0–0.29)
TSH SERPL DL<=0.005 MIU/L-ACNC: 4.2 UIU/ML (ref 0.3–5)
VENTRICULAR RATE- MUSE: 85 BPM
WBC # BLD AUTO: 9.3 10E3/UL (ref 4–11)

## 2022-08-28 PROCEDURE — 93005 ELECTROCARDIOGRAM TRACING: CPT | Performed by: EMERGENCY MEDICINE

## 2022-08-28 PROCEDURE — 99285 EMERGENCY DEPT VISIT HI MDM: CPT | Mod: 25

## 2022-08-28 PROCEDURE — 80048 BASIC METABOLIC PNL TOTAL CA: CPT | Performed by: EMERGENCY MEDICINE

## 2022-08-28 PROCEDURE — 84484 ASSAY OF TROPONIN QUANT: CPT | Performed by: EMERGENCY MEDICINE

## 2022-08-28 PROCEDURE — 85014 HEMATOCRIT: CPT | Performed by: EMERGENCY MEDICINE

## 2022-08-28 PROCEDURE — 36415 COLL VENOUS BLD VENIPUNCTURE: CPT | Performed by: EMERGENCY MEDICINE

## 2022-08-28 PROCEDURE — 84443 ASSAY THYROID STIM HORMONE: CPT | Performed by: EMERGENCY MEDICINE

## 2022-08-28 PROCEDURE — 71045 X-RAY EXAM CHEST 1 VIEW: CPT

## 2022-08-28 PROCEDURE — 83735 ASSAY OF MAGNESIUM: CPT | Performed by: EMERGENCY MEDICINE

## 2022-08-28 ASSESSMENT — ENCOUNTER SYMPTOMS
PALPITATIONS: 1
SHORTNESS OF BREATH: 0
DIZZINESS: 1

## 2022-08-28 ASSESSMENT — ACTIVITIES OF DAILY LIVING (ADL): ADLS_ACUITY_SCORE: 35

## 2022-08-28 NOTE — DISCHARGE INSTRUCTIONS
Keep your appointment tomorrow with your primary care doctor.  Return for any new or worsening symptoms.

## 2022-08-28 NOTE — TELEPHONE ENCOUNTER
Mer calls and says that she had been in the ER a few days ago for her headache. Pt. Says that tonight, she is dizzy and has heart flutters. Denies chest pain. Pt. Will have someone take her to the [facility] ER. COVID 19 Nurse Triage Plan/Patient Instructions    Please be aware that novel coronavirus (COVID-19) may be circulating in the community. If you develop symptoms such as fever, cough, or SOB or if you have concerns about the presence of another infection including coronavirus (COVID-19), please contact your health care provider or visit [email or web address].     Disposition/Instructions    ED Visit recommended. Follow protocol based instructions.     Bring Your Own Device:  Please also bring your smart device(s) (smart phones, tablets, laptops) and their charging cables for your personal use and to communicate with your care team during your visit.    Thank you for taking steps to prevent the spread of this virus.  o Limit your contact with others.  o Wear a simple mask to cover your cough.  o Wash your hands well and often.    Resources    [facility]: About COVID-19: [email or web address]    CDC: What to Do If You're Sick: www.cdc.gov/coronavirus/2019-ncov/about/steps-when-sick.html    CDC: Ending Home Isolation: www.cdc.gov/coronavirus/2019-ncov/hcp/disposition-in-home-patients.html     CDC: Caring for Someone: www.cdc.gov/coronavirus/2019-ncov/if-you-are-sick/care-for-someone.html     [facility]: Interim Guidance for Hospital Discharge to Home: www.health.[facility].mn.us/diseases/coronavirus/hcp/hospdischarge.pdf    [facility] clinical trials (COVID-19 research studies): [email or web address].[email or web address]/um-clinical-trials     Below are the COVID-19 hotlines at the [facility] of Health ([facility]). Interpreters are available.   o For health questions: Call [phone] or [phone] (7 a.m. to 7 p.m.)  o For questions about schools and childcare: Call [phone]  or [phone] (7 a.m. to 7 p.m.)                     Reason for Disposition    Dizziness, lightheadedness, or weakness    Additional Information    Negative: Passed out (i.e., lost consciousness, collapsed and was not responding)    Negative: Shock suspected (e.g., cold/pale/clammy skin, too weak to stand, low BP, rapid pulse)    Negative: Difficult to awaken or acting confused (e.g., disoriented, slurred speech)    Negative: Visible sweat on face or sweat dripping down face    Negative: Unable to walk, or can only walk with assistance (e.g., requires support)    Negative: [1] Received SHOCK from implantable cardiac defibrillator AND [2] persisting symptoms (i.e., palpitations, lightheadedness)    Negative: [1] Dizziness, lightheadedness, or weakness AND [2] heart beating very rapidly (e.g., > 140 / minute)    Negative: [1] Dizziness, lightheadedness, or weakness AND [2] heart beating very slowly (e.g., < 50 / minute)    Negative: Sounds like a life-threatening emergency to the triager    Negative: Chest pain    Negative: Implantable Cardiac Defibrillator (ICD) or a pacemaker symptoms or questions    Negative: Difficulty breathing    Protocols used: HEART RATE AND HEARTBEAT [id number]

## 2022-08-28 NOTE — ED PROVIDER NOTES
EMERGENCY DEPARTMENT ENCOUNTER      NAME: Mer Gonzalez  AGE: 27 year old female  YOB: 1995  MRN: [id number]  EVALUATION DATE & TIME: 8/28/2022 12:37 AM    PCP: Lesly Emery    ED PROVIDER: Sierra Bro M.D.      Chief Complaint   Patient presents with     Palpitations     Dizziness         FINAL IMPRESSION:  1. Palpitations        MEDICAL DECISION MAKING:    Pertinent Labs & Imaging studies reviewed. (See chart for details)  ED Course as of 08/28/22 0201   Sun Aug 28, 2022   0054 Afebrile.  Vital signs here initially some significant hypertension, could be related to some anxiety with being in the emergency department.  Patient is coming in for evaluation of palpitations and slight dizziness.  No pain in the chest.  Recently here and evaluated with head CT for headaches in the setting of hypertension.  She was sent by her primary care doctor's office.  She is scheduled for recheck with them tomorrow to further discuss her hypertension.  She came in because of the palpitations in her chest.  No syncope.    Physical exam for patient here is completely unremarkable.    Patient's EKG here shows sinus rhythm at a rate of 85.  There is no ST elevations or depressions.  No ectopy.  Intervals are intact.  Normal axis.  QTC is 437, QRS 82, .  No previous for comparison.    Will check labs for patient here, since she has been here blood pressures been steadily decreasing, initially in the 200s now down to the 160s.  We will continue to monitor that as well.  No need at this time for any urgent blood pressure administrating medications as she has no signs of hypertensive urgency or emergency.  No headache at present.  She is scheduled to have a appoint with her primary care doctor tomorrow to discuss this further.  Continue to monitor closely.       Labs here unremarkable.  Patient's blood pressure is down to the 140s.  Asymptomatic at this time.  Discharged with follow-up with primary care  "tomorrow.    Critical care: 0 minutes excluding separately billable procedures.  Includes bedside management, time reviewing test results, review of records, discussing the case with staff, documenting the medical record and time spent with family members (or surrogate decision makers) discussing specific treatment issues.          ED COURSE:  12:46 AM I introduced myself to the patient, obtained patient history, performed a physical exam, and discussed plan for ED workup including potential diagnostic laboratory/imaging studies and interventions.  1:51 AM I rechecked the patient and updated them on laboratory and imaging results. We discussed the plan for discharge and the patient is agreeable. Reviewed supportive cares, symptomatic treatment, outpatient follow up, and reasons to return to the Emergency Department. Patient to be discharged by ED RN.    The importance of close follow up was discussed. We reviewed warning signs and symptoms, and I instructed Ms. Gonzalez to return to the emergency department immediately if she develops any new or worsening symptoms. I provided additional verbal discharge instructions. Ms. Gonzalez expressed understanding and agreement with this plan of care, her questions were answered, and she was discharged in stable condition.     MEDICATIONS GIVEN IN THE EMERGENCY:  Medications - No data to display    NEW PRESCRIPTIONS STARTED AT TODAY'S ER VISIT:  New Prescriptions    No medications on file          =================================================================    HPI    Patient information was obtained from: Patient    Use of : N/A       Mer Gonzalez is a 27 year old female who presents to the ED for evaluation of palpitations and dizziness.    Per chart review, the patient was seen in [facility] Emergency Department on 8/25/22 presenting for evaluation of high blood pressure. The patient reported 1 week of intermittent blurry vision, headache, and a \"dazed " "feeling.\" Labs included UA and urine culture, pregnancy negative. Noncontrast head CT was negative for any acute abnormalities. Patient's symptoms improved with compazine, Benadryl, and Toradol. Patient was discharged to home plan for out-patient follow up with PCP.    The patient is presenting this evening with \"heart flutters,\" dizziness, and throat discomfort she describes as a pressure. Her symptoms started around 2130 this evening and have come on randomly throughout the evening. She denies any history of similar symptoms and has no history of hypertension. She denies chest pain, shortness of breath, and any other complaints at this time.    She has an appointment with her PCP on 8/29.      REVIEW OF SYSTEMS   Review of Systems   HENT:        Positive for throat pressure   Respiratory: Negative for shortness of breath.    Cardiovascular: Positive for palpitations. Negative for chest pain.   Neurological: Positive for dizziness.   All other systems reviewed and are negative.        PAST MEDICAL HISTORY:  History reviewed. No pertinent past medical history.    PAST SURGICAL HISTORY:  Past Surgical History:   Procedure Laterality Date     TONSILLECTOMY       WISDOM TOOTH EXTRACTION  2011       CURRENT MEDICATIONS:    No current facility-administered medications for this encounter.    Current Outpatient Medications:      albuterol (PROAIR HFA/PROVENTIL HFA/VENTOLIN HFA) 108 (90 Base) MCG/ACT inhaler, Inhale 2 puffs into the lungs every 4 hours as needed for shortness of breath / dyspnea or wheezing (Patient not taking: Reported on 8/25/2022), Disp: 18 g, Rfl: 3     ibuprofen (ADVIL/MOTRIN) 200 MG tablet, Take 200 mg by mouth every 4 hours as needed for mild pain, Disp: , Rfl:      mirabegron (MYRBETRIQ) 50 MG 24 hr tablet, Take 1 tablet (50 mg) by mouth daily (Patient not taking: Reported on 8/25/2022), Disp: 45 tablet, Rfl: 0     Phenylephrine-Acetaminophen (TYLENOL SINUS CONGESTION/PAIN PO), , Disp: , Rfl:      " valACYclovir (VALTREX) 500 MG tablet, take 1 tablet 2 times daily Orally for  3 days (Patient not taking: Reported on 8/25/2022), Disp: , Rfl:     ALLERGIES:  Allergies   Allergen Reactions     Amoxicillin Hives     Sulfamethoxazole-Trimethoprim [Sulfamethoxazole W/Trimethoprim] Unknown     Latex Hives, Itching, Rash and Swelling       FAMILY HISTORY:  Family History   Problem Relation Age of Onset     Kidney Cancer Father         age 48     Allergic rhinitis Sister        SOCIAL HISTORY:   Social History     Socioeconomic History     Marital status: Single   Tobacco Use     Smoking status: Never Smoker     Smokeless tobacco: Never Used   Vaping Use     Vaping Use: Never used   Substance and Sexual Activity     Alcohol use: Not Currently     Drug use: Never     Sexual activity: Yes     Partners: Male     Birth control/protection: Condom       PHYSICAL EXAM:    Vitals: BP (!) 162/93   Pulse 78   Temp 98.3  F (36.8  C) (Oral)   Resp 14   Wt 81.6 kg (180 lb)   LMP 08/15/2022   SpO2 99%   BMI 34.58 kg/m     General:. Alert and interactive, comfortable appearing.  HENT: Oropharynx without erythema or exudates. MMM.  TMs clear bilaterally.  Eyes: Pupils mid-sized and equally reactive.   Neck: Full AROM.  No midline tenderness to palpation.  Cardiovascular: Regular rate and rhythm. Peripheral pulses 2+ bilaterally.  Chest/Pulmonary: Normal work of breathing. Lung sounds clear and equal throughout, no wheezes or crackles. No chest wall tenderness or deformities.  Abdomen: Soft, nondistended. Nontender without guarding or rebound.  Back/Spine: No CVA or midline tenderness.  Extremities: Normal ROM of all major joints. No lower extremity edema.   Skin: Warm and dry. Normal skin color.   Neuro: Speech clear. CNs grossly intact. Moves all extremities appropriately. Strength and sensation grossly intact to all extremities.   Psych: Normal affect/mood, cooperative, memory appropriate.     LAB:  All pertinent labs reviewed  and interpreted.  Labs Ordered and Resulted from Time of ED Arrival to Time of ED Departure   BASIC METABOLIC PANEL - Abnormal       Result Value    Sodium 139      Potassium 3.4 (*)     Chloride 106      Carbon Dioxide (CO2) 25      Anion Gap 8      Urea Nitrogen 11      Creatinine 0.68      Calcium 9.5      Glucose 94      GFR Estimate >90     MAGNESIUM - Normal    Magnesium 2.0     TSH WITH FREE T4 REFLEX - Normal    TSH 4.20     TROPONIN I - Normal    Troponin I <0.01     CBC WITH PLATELETS AND DIFFERENTIAL    WBC Count 9.3      RBC Count 4.62      Hemoglobin 12.3      Hematocrit 37.0      MCV 80      MCH 26.6      MCHC 33.2      RDW 14.2      Platelet Count 284      % Neutrophils 55      % Lymphocytes 33      % Monocytes 9      % Eosinophils 2      % Basophils 1      % Immature Granulocytes 0      NRBCs per 100 WBC 0      Absolute Neutrophils 5.1      Absolute Lymphocytes 3.1      Absolute Monocytes 0.9      Absolute Eosinophils 0.2      Absolute Basophils 0.1      Absolute Immature Granulocytes 0.0      Absolute NRBCs 0.0         RADIOLOGY:  XR Chest Port 1 View   Final Result   IMPRESSION: Negative chest.          EKG:  See MDM  Performed at: 00:43  Impression: Patient's EKG here shows sinus rhythm at a rate of 85.  There is no ST elevations or depressions.  No ectopy.  Intervals are intact.  Normal axis.  Rate: 85 bpm  Rhythm: Sinus  QRS Interval: 82 ms  QTc Interval: 437 ms  Comparison: No previous EKG for comparison.  I have independently reviewed and interpreted the EKG(s) documented above.       PROCEDURES:   None      I, Jose Juan Virk, am serving as a scribe to document services personally performed by Dr. Sierra Bro  based on my observation and the provider's statements to me. ISierra MD attest that Jose Juan Virk is acting in a scribe capacity, has observed my performance of the services and has documented them in accordance with my direction.      Sierra Bro M.D.  Emergency  [facility] EMERGENCY ROOM  [address] [id number]  [phone]  Dept: [phone]       Sierra Bro MD  08/28/22 0204

## 2022-08-28 NOTE — ED TRIAGE NOTES
"Reports \"heart pounding and fluttering\" since 2130 this evening. Also reports dizziness since that same time. Denies pain. Was seen here recently for elevated BP. Denies cardiac history.       "

## 2022-08-29 ENCOUNTER — OFFICE VISIT (OUTPATIENT)
Dept: FAMILY MEDICINE | Facility: CLINIC | Age: 27
End: 2022-08-29
Payer: COMMERCIAL

## 2022-08-29 VITALS
HEART RATE: 75 BPM | BODY MASS INDEX: 34.96 KG/M2 | DIASTOLIC BLOOD PRESSURE: 108 MMHG | WEIGHT: 182 LBS | SYSTOLIC BLOOD PRESSURE: 138 MMHG

## 2022-08-29 DIAGNOSIS — I10 ESSENTIAL HYPERTENSION: Primary | ICD-10-CM

## 2022-08-29 DIAGNOSIS — R00.2 PALPITATIONS: ICD-10-CM

## 2022-08-29 PROCEDURE — 99213 OFFICE O/P EST LOW 20 MIN: CPT | Performed by: PHYSICIAN ASSISTANT

## 2022-08-29 RX ORDER — AMLODIPINE BESYLATE 5 MG/1
5 TABLET ORAL DAILY
Qty: 30 TABLET | Refills: 1 | Status: SHIPPED | OUTPATIENT
Start: 2022-08-29 | End: 2022-08-29

## 2022-08-29 RX ORDER — AMLODIPINE BESYLATE 5 MG/1
5 TABLET ORAL DAILY
Qty: 30 TABLET | Refills: 1
Start: 2022-08-29 | End: 2022-10-28

## 2022-08-29 ASSESSMENT — ENCOUNTER SYMPTOMS
PALPITATIONS: 1
CONSTITUTIONAL NEGATIVE: 1
RESPIRATORY NEGATIVE: 1
LIGHT-HEADEDNESS: 1

## 2022-08-29 NOTE — PROGRESS NOTES
"  Assessment & Plan     Essential hypertension  Start meds  - amLODIPine (NORVASC) 5 MG tablet  Dispense: 30 tablet; Refill: 1    Palpitations  Set up event monitor  - Adult Cardiac Event Monitor    To ED if acute symptoms. Home blood pressures.               No follow-ups on file.    SANDRO Ashraf  [facility]    Isaac Del Angel is a 27 year old, presenting for the following health issues:  Vertigo, Hypertension, and Headache      27-year-old female presents to clinic with complaint of lightheadedness, elevated blood pressure readings and palpitations with some mild tachycardia  She is noted elevated blood pressure over the last couple of weeks both in office and with home blood pressure readings  She had an episode over the weekend with some palpitations and head pressure she was seen in the emergency room work-up was unremarkable except for elevated blood pressure lab work reviewed including normal TSH chemistries and blood counts EKG unremarkable  These are relatively new things  She has been trying to be a little more healthy lately and working on some modest weight reduction she is meeting with a nutritionist in the near future  Family history of hypertension  She has not had chest pain    History of Present Illness       Hypertension: She presents for follow up of hypertension.  She does not check blood pressure  regularly outside of the clinic. Outside blood pressures have been over 140/90. She follows a low salt diet.     Headaches:   Since the patient's last clinic visit, headaches are: improved  The patient is getting headaches:  Once or more a week recently  She is not able to do normal daily activities when she has a migraine.  The patient is taking the following rescue/relief medications:  Ibuprofen (Advil, Motrin), Tylenol and Excedrin   Patient states \"I get some relief\" from the rescue/relief medications.   The patient is taking the following medications to " prevent migraines:  No medications to prevent migraines  In the past 4 weeks, the patient has gone to an Urgent Care or Emergency Room 1 time times due to headaches.           Review of Systems   Constitutional: Negative.    HENT: Negative.    Respiratory: Negative.    Cardiovascular: Positive for palpitations.   Neurological: Positive for light-headedness.            Objective    BP (!) 138/98 (BP Location: Right arm, Patient Position: Sitting, Cuff Size: Adult Large)   Pulse 75   Wt 82.6 kg (182 lb)   LMP 08/15/2022   BMI 34.96 kg/m    Body mass index is 34.96 kg/m .  Physical Exam oropharynx benign  Neck supple no adenopathy  Lungs clear well ventilated  Cardiovascular good rate and rhythm  Abdomen soft nontender no palpable masses organomegaly                      .  ..

## 2022-09-15 ENCOUNTER — HOSPITAL ENCOUNTER (EMERGENCY)
Facility: CLINIC | Age: 27
Discharge: HOME | End: 2022-09-15
Attending: STUDENT IN AN ORGANIZED HEALTH CARE EDUCATION/TRAINING PROGRAM | Admitting: STUDENT IN AN ORGANIZED HEALTH CARE EDUCATION/TRAINING PROGRAM
Payer: COMMERCIAL

## 2022-09-15 VITALS
RESPIRATION RATE: 16 BRPM | TEMPERATURE: 98.1 F | DIASTOLIC BLOOD PRESSURE: 91 MMHG | HEART RATE: 76 BPM | HEIGHT: 62 IN | BODY MASS INDEX: 32.2 KG/M2 | WEIGHT: 175 LBS | SYSTOLIC BLOOD PRESSURE: 131 MMHG | OXYGEN SATURATION: 98 %

## 2022-09-15 DIAGNOSIS — R20.2 PARESTHESIA: ICD-10-CM

## 2022-09-15 LAB
ALBUMIN SERPL-MCNC: 3.8 G/DL (ref 3.5–5)
ALP SERPL-CCNC: 58 U/L (ref 45–120)
ALT SERPL W P-5'-P-CCNC: 10 U/L (ref 0–45)
ANION GAP SERPL CALCULATED.3IONS-SCNC: 8 MMOL/L (ref 5–18)
AST SERPL W P-5'-P-CCNC: 14 U/L (ref 0–40)
ATRIAL RATE - MUSE: 70 BPM
BASOPHILS # BLD AUTO: 0 10E3/UL (ref 0–0.2)
BASOPHILS NFR BLD AUTO: 1 %
BILIRUB SERPL-MCNC: 1.2 MG/DL (ref 0–1)
BUN SERPL-MCNC: 8 MG/DL (ref 8–22)
CALCIUM SERPL-MCNC: 9 MG/DL (ref 8.5–10.5)
CHLORIDE BLD-SCNC: 106 MMOL/L (ref 98–107)
CO2 SERPL-SCNC: 24 MMOL/L (ref 22–31)
CREAT SERPL-MCNC: 0.73 MG/DL (ref 0.6–1.1)
DIASTOLIC BLOOD PRESSURE - MUSE: 91 MMHG
EOSINOPHIL # BLD AUTO: 0.1 10E3/UL (ref 0–0.7)
EOSINOPHIL NFR BLD AUTO: 1 %
ERYTHROCYTE [DISTWIDTH] IN BLOOD BY AUTOMATED COUNT: 14.1 % (ref 10–15)
GFR SERPL CREATININE-BSD FRML MDRD: >90 ML/MIN/1.73M2
GLUCOSE BLD-MCNC: 99 MG/DL (ref 70–125)
HCT VFR BLD AUTO: 35.5 % (ref 35–47)
HGB BLD-MCNC: 12.1 G/DL (ref 11.7–15.7)
IMM GRANULOCYTES # BLD: 0 10E3/UL
IMM GRANULOCYTES NFR BLD: 0 %
INTERPRETATION ECG - MUSE: NORMAL
LYMPHOCYTES # BLD AUTO: 1.5 10E3/UL (ref 0.8–5.3)
LYMPHOCYTES NFR BLD AUTO: 29 %
MAGNESIUM SERPL-MCNC: 2.1 MG/DL (ref 1.8–2.6)
MCH RBC QN AUTO: 26.9 PG (ref 26.5–33)
MCHC RBC AUTO-ENTMCNC: 34.1 G/DL (ref 31.5–36.5)
MCV RBC AUTO: 79 FL (ref 78–100)
MONOCYTES # BLD AUTO: 0.5 10E3/UL (ref 0–1.3)
MONOCYTES NFR BLD AUTO: 10 %
NEUTROPHILS # BLD AUTO: 3 10E3/UL (ref 1.6–8.3)
NEUTROPHILS NFR BLD AUTO: 59 %
NRBC # BLD AUTO: 0 10E3/UL
NRBC BLD AUTO-RTO: 0 /100
P AXIS - MUSE: 12 DEGREES
PLATELET # BLD AUTO: 252 10E3/UL (ref 150–450)
POTASSIUM BLD-SCNC: 3.6 MMOL/L (ref 3.5–5)
PR INTERVAL - MUSE: 112 MS
PROT SERPL-MCNC: 7.1 G/DL (ref 6–8)
QRS DURATION - MUSE: 82 MS
QT - MUSE: 408 MS
QTC - MUSE: 440 MS
R AXIS - MUSE: 62 DEGREES
RBC # BLD AUTO: 4.5 10E6/UL (ref 3.8–5.2)
SODIUM SERPL-SCNC: 138 MMOL/L (ref 136–145)
SYSTOLIC BLOOD PRESSURE - MUSE: 131 MMHG
T AXIS - MUSE: 21 DEGREES
TROPONIN I SERPL-MCNC: <0.01 NG/ML (ref 0–0.29)
TSH SERPL DL<=0.005 MIU/L-ACNC: 2.7 UIU/ML (ref 0.3–5)
VENTRICULAR RATE- MUSE: 70 BPM
WBC # BLD AUTO: 5.1 10E3/UL (ref 4–11)

## 2022-09-15 PROCEDURE — 85025 COMPLETE CBC W/AUTO DIFF WBC: CPT | Performed by: STUDENT IN AN ORGANIZED HEALTH CARE EDUCATION/TRAINING PROGRAM

## 2022-09-15 PROCEDURE — 82040 ASSAY OF SERUM ALBUMIN: CPT | Performed by: STUDENT IN AN ORGANIZED HEALTH CARE EDUCATION/TRAINING PROGRAM

## 2022-09-15 PROCEDURE — 84443 ASSAY THYROID STIM HORMONE: CPT | Performed by: STUDENT IN AN ORGANIZED HEALTH CARE EDUCATION/TRAINING PROGRAM

## 2022-09-15 PROCEDURE — 80053 COMPREHEN METABOLIC PANEL: CPT | Performed by: STUDENT IN AN ORGANIZED HEALTH CARE EDUCATION/TRAINING PROGRAM

## 2022-09-15 PROCEDURE — 93005 ELECTROCARDIOGRAM TRACING: CPT | Performed by: STUDENT IN AN ORGANIZED HEALTH CARE EDUCATION/TRAINING PROGRAM

## 2022-09-15 PROCEDURE — 99284 EMERGENCY DEPT VISIT MOD MDM: CPT

## 2022-09-15 PROCEDURE — 36415 COLL VENOUS BLD VENIPUNCTURE: CPT | Performed by: STUDENT IN AN ORGANIZED HEALTH CARE EDUCATION/TRAINING PROGRAM

## 2022-09-15 PROCEDURE — 84484 ASSAY OF TROPONIN QUANT: CPT | Performed by: STUDENT IN AN ORGANIZED HEALTH CARE EDUCATION/TRAINING PROGRAM

## 2022-09-15 PROCEDURE — 83735 ASSAY OF MAGNESIUM: CPT | Performed by: STUDENT IN AN ORGANIZED HEALTH CARE EDUCATION/TRAINING PROGRAM

## 2022-09-15 ASSESSMENT — ACTIVITIES OF DAILY LIVING (ADL): ADLS_ACUITY_SCORE: 35

## 2022-09-15 NOTE — DISCHARGE INSTRUCTIONS
Your blood work today was reassuring.  No signs of electrolyte abnormalities, thyroid disease heart attack or kidney damage.    Please follow-up with your primary care doctor if you continue to have symptoms.  The next step to work-up her symptoms would be an MRI of your brain and neck.

## 2022-09-15 NOTE — ED TRIAGE NOTES
"Reports waking up this am at around 0530 feeling bilat arm heaviness, weakness and numbness from shoulders to fingers   Also reporting arms feel cold  Denies any le weakness  Reports on drive to work felt heart racing and got sob lasting approx 5 minutes, pulled over and called EMS and was evaluated at that time  Denies any cp, sob or palpitations currently       Triage Assessment     Row Name 09/15/22 0807       Triage Assessment (Adult)    Airway WDL WDL       Respiratory WDL    Respiratory WDL WDL       Skin Circulation/Temperature WDL    Skin Circulation/Temperature WDL WDL       Cardiac WDL    Cardiac WDL WDL       Peripheral/Neurovascular WDL    Peripheral Neurovascular WDL neurovascular assessment upper;X       LUE Neurovascular Assessment    Color LUE no discoloration    Sensation LUE numbness present  \"heaviness\"       RUE Neurovascular Assessment    Color RUE no discoloration    Sensation RUE numbness present  \"heaviness\"       Cognitive/Neuro/Behavioral WDL    Cognitive/Neuro/Behavioral WDL WDL              "

## 2022-09-15 NOTE — ED PROVIDER NOTES
"  Emergency Department Encounter         FINAL IMPRESSION:  Paresthesias      ED COURSE AND MEDICAL DECISION MAKING   8:06 AM I met with the patient to gather history and to perform my initial exam. We discussed plans for the ED course, including diagnostic testing and treatment.    9:23 AM Rechecked and updated patient. I discussed plans for discharge with the patient, which they were agreeable to. We discussed supportive cares at home and reasons for return to the ER including new or worsening symptoms. All questions and concerns were addressed. Patient to be discharged by RN.       ED Course as of 09/15/22 1335   Thu Sep 15, 2022   1520 Patient is an obese 27-year-old with a history of palpitations and hypertension lisinopril, here from home via EMS with complaints of upper extremity \"coldness\" as well as heaviness and paresthesias.  Patient states she went to bed feeling fine.  Has been doing well this week otherwise.  No fevers, chills, nausea or vomiting this week.  No diarrhea.  Eating and drinking normally.  States he woke up this morning having bilateral arm paresthesias stating that she was having some \"pins-and-needles.\"  States the pins-and-needles went away however she still feels \"something is wrong and they feel heavy.\"  Started driving to work this morning and started having palpitations, heart racing, shortness of breath, and pulled over the side of the road and called 911.  Brought here.  States her arms still feel heavy.  Denies any chest pain, pleuritic pain or shortness of breath.  Otherwise this week and right now denies any vomiting, nausea, diaphoresis.  No abdominal pain.  Currently on her menstrual cycle.  No headache.  No vision issues.  No dysarthria.  Physical examination here showing an NIH of 0.  Heart and lungs normal.  Abdomen benign.  She has really no other complaints other than some nonspecific subjective sensation changes of her upper extremities.  No neck pain.  No recent " trauma.  Plan for basic labs including TSH and reevaluate.  No indication for neuroimaging today.  Anxiety reaction is a possibility.  No signs or symptom suggesting cauda equina or central cord.  Sally sign negative.  She has no headache or vision issues right now.  No family history of MS.  With no headache, unlikely atypical migraine.  No bowel or bladder incontinence/retention again suggesting against cord pathology.  No black or bloody stools.  No infectious symptoms.  No neck stiffness.  No vertigo.  No difficulty walking.  No dysmetria here.   0938 EKG is sinus, nonspecific ST changes including a subtle inversion in aVF and V5 which are new, no STEMI criteria no depressions , overall unchanged from August 28, 2022       Labs reassuring.  EKG as above.  Unsure was causing patient's symptoms.  Has no objective findings today.  Stress is a possibility.  She has no other hard neurologic findings.              At the conclusion of the encounter I discussed the results of all the tests and the disposition. The questions were answered. The patient or family acknowledged understanding and was agreeable with the care plan.              MEDICATIONS GIVEN IN THE EMERGENCY DEPARTMENT:  Medications - No data to display    NEW PRESCRIPTIONS STARTED AT TODAY'S ED VISIT:  Discharge Medication List as of 9/15/2022  9:38 AM          HPI     Patient information obtained from: Patient    Use of Interpretor: N/A     Merishmael Gonzalez is a 27 year old female with a pertinent medical history of scoliosis who presents to this ED via walk-in for evaluation of arm heaviness.     Patient reports that this morning at approximately 5:30 AM she woke up with bilateral arm heaviness, weakness, numbness, and cold sensations, as well as bilateral hand numbness and paraesthesias. She notes that both of her hands felt like they were asleep when she woke up. She states that while the bilateral hand numbness and paraesthesias progressively  resolved, her bilateral arm heaviness, weakness, numbness, and cold sensations remained persistent. She notes that she then went to drive to work (at the [employer] in [place]), but reports that while driving she had a sudden onset of an episode of shortness of breath, chest palpitations characterized by heart pounding and racing, and generalized body shaking for approximately 5-10 minutes, which prompted her to pull over and call EMS. She denies having any of her current symptoms in the past. She mentions that when she went to bed last night she felt fine and was at her normal state of health. She denies any recent heart fluttering, syncope, nausea, vomiting, diarrhea, appetite change, fever, chills, abdominal pain, chest pain, headache, gait problems, urinary issues, bowel issues, rash, or leg issues.    Patient endorses recently taking blood pressure medications, which she denies being a diuretic. She denies any known major surgical history. She denies any recent alcohol use, drug use, or smoking. She states she is currently on her menstrual cycle. She endorses a familial history of cancer, but states she was told this cancer is nonhereditary. She denies any known familial history of MS or thyroid diseases. She denies any other complications at this time.      REVIEW OF SYSTEMS:  Review of Systems   Constitutional: Positive for generalized body shaking. Negative for fever, malaise, chills, appetite change.  HEENT: Negative runny nose, sore throat, ear pain, neck pain  Respiratory: Positive for shortness of breath. Negative for cough, congestion  Cardiovascular: Positive for palpitations (heart pounding and racing, no heart fluttering). Negative for chest pain, leg edema  Gastrointestinal: Negative for abdominal distention, abdominal pain, constipation, vomiting, nausea, diarrhea. Negative for stool incontinence, blood in stool, melena.   Genitourinary: Negative for dysuria and hematuria. Negative for enuresis.  "Negative for urinary retention.   Integument: Negative for rash, skin breakdown  Neurological: Positive for bilateral arm heaviness, bilateral arm weakness, bilateral arm numbness, bilateral arm cold sensations. Positive for bilateral hand numbness. and bilateral hand paraesthesias. Negative for headache. Negative for syncope. Negative for leg heaviness, leg weakness, leg numbness, leg paraesthesias, or leg cold sensations.   Musculoskeletal: Negative for joint pain, joint swelling, gait problem.    All other systems reviewed and are negative.          MEDICAL HISTORY     History reviewed. No pertinent past medical history.    Past Surgical History:   Procedure Laterality Date     TONSILLECTOMY       WISDOM TOOTH EXTRACTION  2011       Social History     Tobacco Use     Smoking status: Never Smoker     Smokeless tobacco: Never Used   Vaping Use     Vaping Use: Never used   Substance Use Topics     Alcohol use: Not Currently     Drug use: Never       amLODIPine (NORVASC) 5 MG tablet  ibuprofen (ADVIL/MOTRIN) 200 MG tablet            PHYSICAL EXAM     BP (!) 131/91   Pulse 76   Temp 98.1  F (36.7  C) (Oral)   Resp 16   Ht 1.575 m (5' 2\")   Wt 79.4 kg (175 lb)   LMP 08/15/2022   SpO2 98%   BMI 32.01 kg/m        PHYSICAL EXAM:     General: Patient appears well, nontoxic, comfortable  HEENT: Moist mucous membranes,  No head trauma.  No midline neck pain.  Cardiovascular: Normal rate, normal rhythm, no extremity edema.  No appreciable murmur.  Respiratory: No signs of respiratory distress, lungs are clear to auscultation bilaterally with no wheezes rhonchi or rales.  Abdominal: Soft, nontender, nondistended, no palpable masses, no guarding, no rebound  Musculoskeletal: Full range of motion of joints, no deformities appreciated.  Neurological:  Alert and oriented, grossly neurologically intact. Gonzalez sign negative.   Psychological: Normal affect and mood.  Integument: No rashes appreciated    National Institutes " [facility] Stroke Scale  Exam Interval: Baseline   Score    Level of consciousness: (0)   Alert, keenly responsive    LOC questions: (0)   Answers both questions correctly    LOC commands: (0)   Performs both tasks correctly    Best gaze: (0)   Normal    Visual: (0)   No visual loss    Facial palsy: (0)   Normal symmetrical movements    Motor arm (left): (0)   No drift    Motor arm (right): (0)   No drift    Motor leg (left): (0)   No drift    Motor leg (right): (0)   No drift    Limb ataxia: (0)   Absent    Sensory: (0)   Normal- no sensory loss    Best language: (0)   Normal- no aphasia    Dysarthria: (0)   Normal    Extinction and inattention: (0)   No abnormality        Total Score:  0             RESULTS       Labs Ordered and Resulted from Time of ED Arrival to Time of ED Departure   COMPREHENSIVE METABOLIC PANEL - Abnormal       Result Value    Sodium 138      Potassium 3.6      Chloride 106      Carbon Dioxide (CO2) 24      Anion Gap 8      Urea Nitrogen 8      Creatinine 0.73      Calcium 9.0      Glucose 99      Alkaline Phosphatase 58      AST 14      ALT 10      Protein Total 7.1      Albumin 3.8      Bilirubin Total 1.2 (*)     GFR Estimate >90     MAGNESIUM - Normal    Magnesium 2.1     TROPONIN I - Normal    Troponin I <0.01     TSH WITH FREE T4 REFLEX - Normal    TSH 2.70     CBC WITH PLATELETS AND DIFFERENTIAL    WBC Count 5.1      RBC Count 4.50      Hemoglobin 12.1      Hematocrit 35.5      MCV 79      MCH 26.9      MCHC 34.1      RDW 14.1      Platelet Count 252      % Neutrophils 59      % Lymphocytes 29      % Monocytes 10      % Eosinophils 1      % Basophils 1      % Immature Granulocytes 0      NRBCs per 100 WBC 0      Absolute Neutrophils 3.0      Absolute Lymphocytes 1.5      Absolute Monocytes 0.5      Absolute Eosinophils 0.1      Absolute Basophils 0.0      Absolute Immature Granulocytes 0.0      Absolute NRBCs 0.0         PROCEDURES:  Procedures:  Procedures   None.     Baron POLLARD  Bhavin am serving as a scribe to document services personally performed by Jeremiah Pichardo DO, based on my observations and the provider's statements to me.  I, Jeremiah Pichardo DO, attest that Baron Delcid is acting in a scribe capacity, has observed my performance of the services and has documented them in accordance with my direction.    Jeremiah Pichardo DO  Emergency Medicine  [facility] EMERGENCY ROOM       Ohsasha, Jeremiah Cardona DO  09/15/22 1528

## 2022-09-16 ENCOUNTER — PATIENT OUTREACH (OUTPATIENT)
Dept: CARE COORDINATION | Facility: CLINIC | Age: 27
End: 2022-09-16

## 2022-09-16 NOTE — PROGRESS NOTES
Clinic Care Coordination Contact  [facility]: Post-Discharge Note  SITUATION                                                      Admission:    Admission Date: 09/15/22   Reason for Admission: Paresthesias  Discharge:   Discharge Date: 09/15/22  Discharge Diagnosis: Paresthesias    BACKGROUND                                                      Per hospital discharge summary and inpatient provider notes:    Mer Gonzalez is a 27 year old female with a pertinent medical history of scoliosis who presents to this ED via walk-in for evaluation of arm heaviness.      Patient reports that this morning at approximately 5:30 AM she woke up with bilateral arm heaviness, weakness, numbness, and cold sensations, as well as bilateral hand numbness and paraesthesias. She notes that both of her hands felt like they were asleep when she woke up. She states that while the bilateral hand numbness and paraesthesias progressively resolved, her bilateral arm heaviness, weakness, numbness, and cold sensations remained persistent. She notes that she then went to drive to work (at the [employer] in [place]), but reports that while driving she had a sudden onset of an episode of shortness of breath, chest palpitations characterized by heart pounding and racing, and generalized body shaking for approximately 5-10 minutes, which prompted her to pull over and call EMS. She denies having any of her current symptoms in the past. She mentions that when she went to bed last night she felt fine and was at her normal state of health. She denies any recent heart fluttering, syncope, nausea, vomiting, diarrhea, appetite change, fever, chills, abdominal pain, chest pain, headache, gait problems, urinary issues, bowel issues, rash, or leg issues.     Patient endorses recently taking blood pressure medications, which she denies being a diuretic. She denies any known major surgical history. She denies any recent alcohol use, drug use, or smoking. She  "states she is currently on her menstrual cycle. She endorses a familial history of cancer, but states she was told this cancer is nonhereditary. She denies any known familial history of MS or thyroid diseases. She denies any other complications at this time.    ASSESSMENT      Discharge Assessment  How are you doing now that you are home?: \"Doing better\"  How are your symptoms? (Red Flag symptoms escalate to triage hotline per guidelines): Improved  Do you feel your condition is stable enough to be safe at home until your provider visit?: Yes  Does the patient have their discharge instructions? : Yes  Does the patient have questions regarding their discharge instructions? : No  Were you started on any new medications or were there changes to any of your previous medications? : No  Does the patient have all of their medications?: Yes  Do you have questions regarding any of your medications? : No  Do you have all of your needed medical supplies or equipment (DME)?  (i.e. oxygen tank, CPAP, cane, etc.): Yes  Discharge follow-up appointment scheduled within 14 calendar days? : Yes  Discharge Follow Up Appointment Date: 09/21/22  Discharge Follow Up Appointment Scheduled with?: Primary Care Provider    Post-op ([facility] Only)  If the patient had a surgery or procedure, do they have any questions for a nurse?: No    PLAN                                                      Outpatient Plan:    Follow up with Lesly Emery CNP (Nurse Practitioner - Gerontology) in 1 week (9/22/2022); If symptoms worsen    Future Appointments   Date Time Provider Department Center   9/21/2022 12:00 PM Lesly Emery CNP [facility] [place] [facility]         For any urgent concerns, please contact our 24 hour nurse triage line: [phone] ([phone])         CARSON Mercer  [phone]  [facility]    "

## 2022-09-20 ENCOUNTER — TRANSFERRED RECORDS (OUTPATIENT)
Dept: HEALTH INFORMATION MANAGEMENT | Facility: CLINIC | Age: 27
End: 2022-09-20

## 2022-09-21 ENCOUNTER — OFFICE VISIT (OUTPATIENT)
Dept: NEUROLOGY | Facility: CLINIC | Age: 27
End: 2022-09-21
Attending: NURSE PRACTITIONER

## 2022-09-21 ENCOUNTER — VIRTUAL VISIT (OUTPATIENT)
Dept: INTERNAL MEDICINE | Facility: CLINIC | Age: 27
End: 2022-09-21
Payer: COMMERCIAL

## 2022-09-21 ENCOUNTER — PRE VISIT (OUTPATIENT)
Dept: NEUROLOGY | Facility: CLINIC | Age: 27
End: 2022-09-21

## 2022-09-21 VITALS
WEIGHT: 180 LBS | BODY MASS INDEX: 32.92 KG/M2 | HEART RATE: 81 BPM | SYSTOLIC BLOOD PRESSURE: 139 MMHG | OXYGEN SATURATION: 100 % | DIASTOLIC BLOOD PRESSURE: 89 MMHG | RESPIRATION RATE: 16 BRPM

## 2022-09-21 DIAGNOSIS — I10 PRIMARY HYPERTENSION: ICD-10-CM

## 2022-09-21 DIAGNOSIS — R42 DIZZINESS: ICD-10-CM

## 2022-09-21 DIAGNOSIS — R20.2 PARESTHESIAS: ICD-10-CM

## 2022-09-21 DIAGNOSIS — G43.009 MIGRAINE WITHOUT AURA AND WITHOUT STATUS MIGRAINOSUS, NOT INTRACTABLE: ICD-10-CM

## 2022-09-21 PROCEDURE — 99213 OFFICE O/P EST LOW 20 MIN: CPT | Mod: TEL | Performed by: NURSE PRACTITIONER

## 2022-09-21 PROCEDURE — 99205 OFFICE O/P NEW HI 60 MIN: CPT | Performed by: PSYCHIATRY & NEUROLOGY

## 2022-09-21 ASSESSMENT — PAIN SCALES - GENERAL: PAINLEVEL: NO PAIN (0)

## 2022-09-21 NOTE — PATIENT INSTRUCTIONS
I suspect you may have some intracranial hypertension given your blurred vision, pressure headaches, odd tinnitus of one ear.  I would like you to rule out conditions like a venous clot in the head or neck with venous studies (CTV head and neck).  I would like you to rule out conditions like tumor or stroke by obtaining an MRI brain.  Further testing of your vision, and possible papilledema can be done with an ophthalmologist.    If all testing is unrevealing, either a lumbar puncture to obtain an opening pressure relating to your CSF or a trial of a medication to relieve intracranial pressure could be done.

## 2022-09-21 NOTE — LETTER
9/21/2022       RE: Mer Gonzalez  [address]  [facility] [address]     Dear Colleague,    Thank you for referring your patient, Mer Gonzalez, to the [facility] NEUROLOG[facility] at [facility]. Please see a copy of my visit note below.    [facility] Neurology Consultation    Mer Gonzalez MRN# [id number]   Age: 27 year old YOB: 1995     Requesting physician: Lesly Malik     Reason for Consultation: arm heaviness      History of Presenting Symptoms:   Mer Gonzalez is a 27 year old female who presents today for evaluation of arm heaviness.    Her first head rush sensation was in the end of August.  This was prior to her 8/25/2022 presentation.  She described a sense of not being able to breath, as if holding her breath.  This sensation then led to her head feeling full/pressure.  This sensation then led to feeling light-headed.  This light-headedness was then leading to feeling foggy in mentation.  It would occur at random times (getting out of car, while sitting and working).  The sensation may last 4-5 minutes in intensity, and then she may have a brain pressure feeling (like sinus pressure but higher).  She may feel normal after 20 minutes.  She may have been having 2-3 episodes a day prior to 8/25/2022 presentation.    The patient was seen 9/15/2022 with the ED for b/l arm heaviness with paresthesias (cold, numb) in the hands and arms as well.  The sensory symptoms resolved but her weakness persisted.  Then, when driving to work, she developed SOB, chest palpitations, tachycardia, and general shaking for 5-10 minutes.  In the ED, her NIHSS was 0.  /91.  No major interventions were done.  CT head from an ED visit 8/25/2022 for headache was reviewed and was normal.   During the 8/25/2022 visit with the ED, she was noted to have elevated BP (180 systolic), and pyuria.    She has awoken to whistling in  her right ear at times. It is high-pitched. It is not constant, but fluctuates without a trigger. She may have blurred vision in both eyes at times.  She notes having head pressure at this time, like her head is too big.  It is holocephalic.      She indicates that she had an out of body experience during prior to her ED visit 8/25/2022.    No psychiatric history of depression, anxiety.  There is no physical or childhood trauma to report.  No drug use history.  She has had herpes in the past, but is not actively treating it.  She has no head trauma. She is not on birth control.      She just had a 14 day home heart monitor which is being monitored with her PCP.     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  No issues with home environment.  No alcohol use (minimal). No smoking history. No use of marijuana at this time.     Medications:   Amlodipine     Physical Exam:   Vitals: /89   Pulse 81   Resp 16   Wt 81.6 kg (180 lb)   LMP 08/15/2022   SpO2 100%   BMI 32.92 kg/m     General: Seated comfortably in no acute distress.  HEENT: Neck supple with normal range of motion. No paracervical muscle tenderness or tightness.  Optic discs difficult to visualize on the left side but the right side could be seen and looked somewhat swollen.  Skin: No rashes  Neurologic:     Mental Status: Fully alert, attentive and oriented. Speech clear and fluent, no paraphasic errors.      Cranial Nerves: Visual fields intact. PERRL. EOMI with normal smooth pursuit. Facial sensation intact/symmetric. Facial movements symmetric. Hearing not formally tested but intact to conversation. Palate elevation symmetric, uvula midline. No dysarthria. Shoulder shrug strong bilaterally. Tongue protrusion midline.     Motor: No tremors or other abnormal movements observed. Muscle tone normal throughout. No pronator drift. Normal/symmetric rapid finger tapping. Strength 5/5  throughout upper and lower extremities.     Deep Tendon Reflexes: 2+/symmetric throughout upper and lower extremities. No clonus. Toes downgoing bilaterally.     Sensory: Intact/symmetric to light touch, pinprick, temperature, vibration and proprioception throughout upper and lower extremities. Negative Romberg.      Coordination: Finger-nose-finger and heel-shin intact without dysmetria. Rapid alternating movements intact/symmetric with normal speed and rhythm.     Gait: Normal, steady casual gait. Able to walk on toes, heels and tandem without difficulty.         Assessment and Plan:   Assessment:  Paresthesias, light-headedness, tinnitus that is whistling and pulsatile, vision changes ~ concern for intracranial hypertension     The patient has a number of symptoms, which could at first seem anxiety/stress related.  However, I do not find this to be the case given the lack of stress reported, lack of psychiatric conditions present, and overall demeanor of the patient today.  I am concerned for a possible unifying diagnosis, like intracranial hypertension from some source like jugular venous stenosis or a cerebral venous thrombosis/stenosis given her pulsatile tinnitus but lack of major brainstem related tumor on prior Head CT.  I also think she has some papilledema of at least the right eye, but had difficulty in visualizing the left today.  Imaging with MRI, and venous studies will be needed to better define signs of intracranial hypertension.  Following those imaging, a visit with an ophthalmologist for evaluation would be helpful given a dilated eye exam could be done.  Pending results, a trial of acetazolamide could be considered to see if it helps reduce visual and headache related symptoms.    Plan:  - CTV head and neck w/wout  - MRI brain w/wout contrast  - Ophthalmology referral    Follow up in Neurology clinic in 6 weeks, or should new concerns arise.      BECKA Mckenzie D.O.   of  Neurology    Total time today (71 min) in this patient encounter was spent on pre-charting, counseling and/or coordination of care.   The patient is in agreement with this plan and has no further questions.

## 2022-09-21 NOTE — TELEPHONE ENCOUNTER
FUTURE VISIT INFORMATION      FUTURE VISIT INFORMATION:    Date: 9/21/2022    Time: 4pm    [place] [facility] [place]  REFERRAL INFORMATION:    Referring provider:  Dr. Pichardo     Referring providers clinic:  [facility]     Reason for visit/diagnosis  ED Follow-up     RECORDS REQUESTED FROM:       Clinic name Comments Records Status Imaging Status   Internal ED Visit-9/15/2022    JEVON Emery-9/21/2022    CT Head-8/25/2022 Epic PACS

## 2022-09-21 NOTE — PROGRESS NOTES
"Mer is a 27 year old who is being evaluated via a billable telephone visit.      What phone number would you like to be contacted at? [phone]  How would you like to obtain your AVS? Tyrahart    Assessment & Plan     Dizziness/Paresthesias/Migraine without aura and without status migrainosus, not intractable: Hx of paresthesias that come and go in her arms since 09/15; brain fogginess, heaviness, and dizziness. Hx of migraine headaches. Will refer to Neurology for further work up. Follow up if symptoms persist or worsen.   - Adult Neurology  Referral    Primary hypertension: Home blood pressures have been within goal at home, under 140/90. She continues on norvasc 5mg daily. Stable.      BMI:   Estimated body mass index is 32.01 kg/m  as calculated from the following:    Height as of 9/15/22: 1.575 m (5' 2\").    Weight as of 9/15/22: 79.4 kg (175 lb).     Return in about 2 weeks (around 10/5/2022) for Follow up.    Lesly Emery CNP  [facility]    Isaac Del Angel is a 27 year old presenting for the following health issues:  Follow Up (Follow up ER- [facility])      HPI     The patient presents today for follow up.    She was seen at the [facility] ER on 09/15/22 for upper extremity coldness, heaviness, and paraesthesias.    She was driving, and pulled over, called 911, and was brought into the ER for this.    She reports that she woke up the AM of 09/15 and had pins and needles like feeling in both upper arms.    Her workup at the ER was benign in nature.    She had a head CT done on 08/25 that showed no acute findings. She does have a history of migraine headaches.    She reports that today she continues to have some brain fog, head feels heavy at times, she still also has some numbness and tingling in her arms that comes and goes.    Home blood pressures have been under 140/90; she is tolerating the Norvasc well.    She does not feel anxious. She reports that " she was at [facility], and will need to have a uterine polyp removed. The thought of surgery made her anxious at this time, but otherwise she has been okay.    She is turing in her heart monitor today, she has a history of PVC's.    She denies other concerns today.     Review of Systems   Constitutional, HEENT, cardiovascular, pulmonary, GI, , musculoskeletal, neuro, skin, endocrine and psych systems are negative, except as otherwise noted.      Objective           Vitals:  No vitals were obtained today due to virtual visit.    Physical Exam   healthy, alert and no distress  PSYCH: Alert and oriented times 3; coherent speech, normal   rate and volume, able to articulate logical thoughts, able   to abstract reason, no tangential thoughts, no hallucinations   or delusions  Her affect is normal  RESP: No cough, no audible wheezing, able to talk in full sentences  Remainder of exam unable to be completed due to telephone visits        Phone call duration: 12 minutes

## 2022-09-21 NOTE — PROGRESS NOTES
[facility] Neurology Consultation    Mer Gonzalez MRN# [id number]   Age: 27 year old YOB: 1995     Requesting physician: Lesly Malik     Reason for Consultation: arm heaviness      History of Presenting Symptoms:   Mer oGnzalez is a 27 year old female who presents today for evaluation of arm heaviness.    Her first head rush sensation was in the end of August.  This was prior to her 8/25/2022 presentation.  She described a sense of not being able to breath, as if holding her breath.  This sensation then led to her head feeling full/pressure.  This sensation then led to feeling light-headed.  This light-headedness was then leading to feeling foggy in mentation.  It would occur at random times (getting out of car, while sitting and working).  The sensation may last 4-5 minutes in intensity, and then she may have a brain pressure feeling (like sinus pressure but higher).  She may feel normal after 20 minutes.  She may have been having 2-3 episodes a day prior to 8/25/2022 presentation.    The patient was seen 9/15/2022 with the ED for b/l arm heaviness with paresthesias (cold, numb) in the hands and arms as well.  The sensory symptoms resolved but her weakness persisted.  Then, when driving to work, she developed SOB, chest palpitations, tachycardia, and general shaking for 5-10 minutes.  In the ED, her NIHSS was 0.  /91.  No major interventions were done.  CT head from an ED visit 8/25/2022 for headache was reviewed and was normal.   During the 8/25/2022 visit with the ED, she was noted to have elevated BP (180 systolic), and pyuria.    She has awoken to whistling in her right ear at times. It is high-pitched. It is not constant, but fluctuates without a trigger. She may have blurred vision in both eyes at times.  She notes having head pressure at this time, like her head is too big.  It is holocephalic.      She indicates that she had an out of body experience during prior  to her ED visit 8/25/2022.    No psychiatric history of depression, anxiety.  There is no physical or childhood trauma to report.  No drug use history.  She has had herpes in the past, but is not actively treating it.  She has no head trauma. She is not on birth control.      She just had a 14 day home heart monitor which is being monitored with her PCP.     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  No issues with home environment.  No alcohol use (minimal). No smoking history. No use of marijuana at this time.     Medications:   Amlodipine     Physical Exam:   Vitals: /89   Pulse 81   Resp 16   Wt 81.6 kg (180 lb)   LMP 08/15/2022   SpO2 100%   BMI 32.92 kg/m     General: Seated comfortably in no acute distress.  HEENT: Neck supple with normal range of motion. No paracervical muscle tenderness or tightness.  Optic discs difficult to visualize on the left side but the right side could be seen and looked somewhat swollen.  Skin: No rashes  Neurologic:     Mental Status: Fully alert, attentive and oriented. Speech clear and fluent, no paraphasic errors.      Cranial Nerves: Visual fields intact. PERRL. EOMI with normal smooth pursuit. Facial sensation intact/symmetric. Facial movements symmetric. Hearing not formally tested but intact to conversation. Palate elevation symmetric, uvula midline. No dysarthria. Shoulder shrug strong bilaterally. Tongue protrusion midline.     Motor: No tremors or other abnormal movements observed. Muscle tone normal throughout. No pronator drift. Normal/symmetric rapid finger tapping. Strength 5/5 throughout upper and lower extremities.     Deep Tendon Reflexes: 2+/symmetric throughout upper and lower extremities. No clonus. Toes downgoing bilaterally.     Sensory: Intact/symmetric to light touch, pinprick, temperature, vibration and proprioception throughout upper and lower extremities. Negative Romberg.       Coordination: Finger-nose-finger and heel-shin intact without dysmetria. Rapid alternating movements intact/symmetric with normal speed and rhythm.     Gait: Normal, steady casual gait. Able to walk on toes, heels and tandem without difficulty.         Assessment and Plan:   Assessment:  Paresthesias, light-headedness, tinnitus that is whistling and pulsatile, vision changes ~ concern for intracranial hypertension     The patient has a number of symptoms, which could at first seem anxiety/stress related.  However, I do not find this to be the case given the lack of stress reported, lack of psychiatric conditions present, and overall demeanor of the patient today.  I am concerned for a possible unifying diagnosis, like intracranial hypertension from some source like jugular venous stenosis or a cerebral venous thrombosis/stenosis given her pulsatile tinnitus but lack of major brainstem related tumor on prior Head CT.  I also think she has some papilledema of at least the right eye, but had difficulty in visualizing the left today.  Imaging with MRI, and venous studies will be needed to better define signs of intracranial hypertension.  Following those imaging, a visit with an ophthalmologist for evaluation would be helpful given a dilated eye exam could be done.  Pending results, a trial of acetazolamide could be considered to see if it helps reduce visual and headache related symptoms.    Plan:  - CTV head and neck w/wout  - MRI brain w/wout contrast  - Ophthalmology referral    Follow up in Neurology clinic in 6 weeks, or should new concerns arise.    BECKA Mckenzie D.O.   of Neurology    Total time today (71 min) in this patient encounter was spent on pre-charting, counseling and/or coordination of care.   The patient is in agreement with this plan and has no further questions.

## 2022-09-22 ENCOUNTER — TELEPHONE (OUTPATIENT)
Dept: OPHTHALMOLOGY | Facility: CLINIC | Age: 27
End: 2022-09-22

## 2022-09-22 ENCOUNTER — MYC MEDICAL ADVICE (OUTPATIENT)
Dept: INTERNAL MEDICINE | Facility: CLINIC | Age: 27
End: 2022-09-22

## 2022-09-22 NOTE — TELEPHONE ENCOUNTER
Health Call Center    Phone Message    May a detailed message be left on voicemail: yes     Reason for Call: Appointment Intake    Referring Provider Name: Pratik Mckenzie,  in [facility]   Diagnosis and/or Symptoms: Dizziness [R42]  Migraine without aura and without status migrainosus, not intractable    Intracranial hypertension suspected. looking for papilledema     Protocols indicate that TE needs to be sent for dx. Please follow-up with pt to assist with scheduling. Pt would prefer to stay at the [facility] clinic if possible. Thank you.     Action Taken: Other:   eye    Travel Screening: Not Applicable                                                                            No/Not applicable

## 2022-09-22 NOTE — TELEPHONE ENCOUNTER
Called and LVM     Mer can make an appointment with any general ophth to rule out papilledema.     Lakeisha Bernal Communication Facilitator on 9/22/2022 at 10:05 AM

## 2022-09-23 NOTE — TELEPHONE ENCOUNTER
Lesly,  Please see MyChart message from patient needing provider direction.    Please respond directly to patient if appropriate.    FAB AcostaN, RN  [facility]

## 2022-10-01 ENCOUNTER — HEALTH MAINTENANCE LETTER (OUTPATIENT)
Age: 27
End: 2022-10-01

## 2022-10-04 ENCOUNTER — HOSPITAL ENCOUNTER (OUTPATIENT)
Dept: MRI IMAGING | Facility: CLINIC | Age: 27
Discharge: HOME | End: 2022-10-04
Attending: PSYCHIATRY & NEUROLOGY
Payer: COMMERCIAL

## 2022-10-04 ENCOUNTER — HOSPITAL ENCOUNTER (OUTPATIENT)
Dept: CT IMAGING | Facility: CLINIC | Age: 27
Discharge: HOME | End: 2022-10-04
Attending: PSYCHIATRY & NEUROLOGY
Payer: COMMERCIAL

## 2022-10-04 DIAGNOSIS — R42 DIZZINESS: ICD-10-CM

## 2022-10-04 DIAGNOSIS — G43.009 MIGRAINE WITHOUT AURA AND WITHOUT STATUS MIGRAINOSUS, NOT INTRACTABLE: ICD-10-CM

## 2022-10-04 PROCEDURE — A9585 GADOBUTROL INJECTION: HCPCS | Performed by: PSYCHIATRY & NEUROLOGY

## 2022-10-04 PROCEDURE — 70496 CT ANGIOGRAPHY HEAD: CPT

## 2022-10-04 PROCEDURE — 70498 CT ANGIOGRAPHY NECK: CPT

## 2022-10-04 PROCEDURE — 255N000002 HC RX 255 OP 636: Performed by: PSYCHIATRY & NEUROLOGY

## 2022-10-04 PROCEDURE — 70553 MRI BRAIN STEM W/O & W/DYE: CPT

## 2022-10-04 PROCEDURE — 250N000011 HC RX IP 250 OP 636: Performed by: PSYCHIATRY & NEUROLOGY

## 2022-10-04 RX ORDER — IOPAMIDOL 755 MG/ML
100 INJECTION, SOLUTION INTRAVASCULAR ONCE
Status: COMPLETED | OUTPATIENT
Start: 2022-10-04 | End: 2022-10-04

## 2022-10-04 RX ORDER — GADOBUTROL 604.72 MG/ML
8 INJECTION INTRAVENOUS ONCE
Status: COMPLETED | OUTPATIENT
Start: 2022-10-04 | End: 2022-10-04

## 2022-10-04 RX ADMIN — GADOBUTROL 8 ML: 604.72 INJECTION INTRAVENOUS at 19:04

## 2022-10-04 RX ADMIN — IOPAMIDOL 75 ML: 755 INJECTION, SOLUTION INTRAVENOUS at 17:38

## 2022-10-05 ENCOUNTER — OFFICE VISIT (OUTPATIENT)
Dept: INTERNAL MEDICINE | Facility: CLINIC | Age: 27
End: 2022-10-05
Payer: COMMERCIAL

## 2022-10-05 VITALS
TEMPERATURE: 97.8 F | BODY MASS INDEX: 33.43 KG/M2 | WEIGHT: 182.8 LBS | DIASTOLIC BLOOD PRESSURE: 86 MMHG | SYSTOLIC BLOOD PRESSURE: 126 MMHG | OXYGEN SATURATION: 99 % | HEART RATE: 92 BPM

## 2022-10-05 DIAGNOSIS — N84.0 UTERINE POLYP: ICD-10-CM

## 2022-10-05 DIAGNOSIS — E66.811 CLASS 1 OBESITY DUE TO EXCESS CALORIES WITH SERIOUS COMORBIDITY AND BODY MASS INDEX (BMI) OF 33.0 TO 33.9 IN ADULT: ICD-10-CM

## 2022-10-05 DIAGNOSIS — N30.00 ACUTE CYSTITIS WITHOUT HEMATURIA: ICD-10-CM

## 2022-10-05 DIAGNOSIS — R20.2 PARESTHESIAS: ICD-10-CM

## 2022-10-05 DIAGNOSIS — I10 PRIMARY HYPERTENSION: ICD-10-CM

## 2022-10-05 DIAGNOSIS — G43.009 MIGRAINE WITHOUT AURA AND WITHOUT STATUS MIGRAINOSUS, NOT INTRACTABLE: ICD-10-CM

## 2022-10-05 DIAGNOSIS — R82.90 FOUL SMELLING URINE: ICD-10-CM

## 2022-10-05 DIAGNOSIS — R42 DIZZINESS: ICD-10-CM

## 2022-10-05 DIAGNOSIS — E66.09 CLASS 1 OBESITY DUE TO EXCESS CALORIES WITH SERIOUS COMORBIDITY AND BODY MASS INDEX (BMI) OF 33.0 TO 33.9 IN ADULT: ICD-10-CM

## 2022-10-05 DIAGNOSIS — Z01.818 PREOPERATIVE EXAMINATION: Primary | ICD-10-CM

## 2022-10-05 LAB
ALBUMIN UR-MCNC: NEGATIVE MG/DL
APPEARANCE UR: ABNORMAL
BACTERIA #/AREA URNS HPF: ABNORMAL /HPF
BILIRUB UR QL STRIP: NEGATIVE
COLOR UR AUTO: YELLOW
GLUCOSE UR STRIP-MCNC: NEGATIVE MG/DL
HGB UR QL STRIP: ABNORMAL
KETONES UR STRIP-MCNC: NEGATIVE MG/DL
LEUKOCYTE ESTERASE UR QL STRIP: ABNORMAL
NITRATE UR QL: POSITIVE
PH UR STRIP: 6.5 [PH] (ref 5–8)
RBC #/AREA URNS AUTO: ABNORMAL /HPF
SP GR UR STRIP: 1.02 (ref 1–1.03)
SQUAMOUS #/AREA URNS AUTO: ABNORMAL /LPF
UROBILINOGEN UR STRIP-ACNC: 0.2 E.U./DL
WBC #/AREA URNS AUTO: ABNORMAL /HPF

## 2022-10-05 PROCEDURE — 87186 SC STD MICRODIL/AGAR DIL: CPT | Performed by: NURSE PRACTITIONER

## 2022-10-05 PROCEDURE — 81001 URINALYSIS AUTO W/SCOPE: CPT | Performed by: NURSE PRACTITIONER

## 2022-10-05 PROCEDURE — 87086 URINE CULTURE/COLONY COUNT: CPT | Performed by: NURSE PRACTITIONER

## 2022-10-05 PROCEDURE — 99214 OFFICE O/P EST MOD 30 MIN: CPT | Performed by: NURSE PRACTITIONER

## 2022-10-05 RX ORDER — NITROFURANTOIN 25; 75 MG/1; MG/1
100 CAPSULE ORAL 2 TIMES DAILY
Qty: 10 CAPSULE | Refills: 0 | Status: SHIPPED | OUTPATIENT
Start: 2022-10-05 | End: 2023-01-06

## 2022-10-05 NOTE — PATIENT INSTRUCTIONS
Your urine is processing, I will release results on my chart once they are back.    No Aleve, Advil, Ibuprofen 7 days prior to procedure.    Home COVID testing.    I will update you of your heart monitor once the results are back.    Follow up in 3 months for a recheck, before then if anything comes up.

## 2022-10-05 NOTE — PROGRESS NOTES
[facility]  [address] [facility] [address]  Phone: [phone]  Fax: [phone]  Primary Provider: Maryann Emery  Pre-op Performing Provider: MARYANN EMERY    PREOPERATIVE EVALUATION:  Today's date: 10/5/2022    Mer Gonzalez is a 27 year old female who presents for a preoperative evaluation.    Surgical Information:  Surgery/Procedure: Polyp Removal  Surgery Location: [facility]  Surgeon: Dr. Schaffer  Surgery Date: 10/20/20  Time of Surgery: Unknown  Where patient plans to recover: At home with family  Fax number for surgical facility:     Type of Anesthesia Anticipated: Local with MAC    Assessment & Plan     The proposed surgical procedure is considered LOW risk.    Preoperative examination: No EKG is needed, labs were normal on 09/15/22. Home COVID test prior to procedure. No NSAIDS 7 days prior to procedure. Follow up prior to procedure if having new symptoms.     Uterine polyp: To have this removed.     Primary hypertension: She continues on Norvasc. Blood pressure today in office was 126/86. Stable.     Foul smelling urine/Acute cystitis without hematuria: Urine shows infection. Will treat with Macrobid 100mg twice daily x 5 days, culture is processing.   - UA Macro with Reflex to Micro and Culture - lab collect  - Urine Microscopic Exam  - Urine Culture  - nitroFURantoin macrocrystal-monohydrate (MACROBID) 100 MG capsule  Dispense: 10 capsule; Refill: 0    Migraine without aura and without status migrainosus, not intractable/Dizziness/Paresthesias: Currently being worked up by Neurology.     Class 1 obesity due to excess calories with serious comorbidity and body mass index (BMI) of 33.0 to 33.9 in adult: BMI today was 33.43. She continues to work on diet and exercise.     Risks and Recommendations:  The patient has the following additional risks and recommendations for perioperative complications:   - No identified additional risk  factors other than previously addressed    Medication Instructions:  Discussed no NSAIDS 7 days prior    RECOMMENDATION:  APPROVAL GIVEN to proceed with proposed procedure, without further diagnostic evaluation.    Subjective     HPI related to upcoming procedure: The patient presents today for a preoperative examination.    She will be having a uterine polyp removed in office at [facility].     She denies any issues with anesthesia in the past. She would like to be a full code.    She last had labs done 09/15; her hemoglobin was normal.    She is undergoing evaluation through Neurology for migraine headaches and dizziness. Her follow up with them is scheduled in November.    She reports noticing that her urine has been very foul smelling in nature. She first noticed this about the last couple of weeks. No other urgency, frequency, burning, or flank pain. No nausea/vomiting.     Preop Questions 10/5/2022   1. Have you ever had a heart attack or stroke? No   2. Have you ever had surgery on your heart or blood vessels, such as a stent placement, a coronary artery bypass, or surgery on an artery in your head, neck, heart, or legs? No   3. Do you have chest pain with activity? No   4. Do you have a history of  heart failure? No   5. Do you currently have a cold, bronchitis or symptoms of other infection? No   6. Do you have a cough, shortness of breath, or wheezing? No   7. Do you or anyone in your family have previous history of blood clots? No   8. Do you or does anyone in your family have a serious bleeding problem such as prolonged bleeding following surgeries or cuts? No   9. Have you ever had problems with anemia or been told to take iron pills? No   10. Have you had any abnormal blood loss such as black, tarry or bloody stools, or abnormal vaginal bleeding? YES - Vaginal bleeding   11. Have you ever had a blood transfusion? No   12. Are you willing to have a blood transfusion if it is medically needed  before, during, or after your surgery? Yes   13. Have you or any of your relatives ever had problems with anesthesia? No   14. Do you have sleep apnea, excessive snoring or daytime drowsiness? No   15. Do you have any artifical heart valves or other implanted medical devices like a pacemaker, defibrillator, or continuous glucose monitor? No   16. Do you have artificial joints? No   17. Are you allergic to latex? YES: Known allergy.   18. Is there any chance that you may be pregnant? No       Health Care Directive:  Patient does not have a Health Care Directive or Living Will: Full Code    Preoperative Review of :   reviewed - no record of controlled substances prescribed.    Review of Systems  Constitutional, neuro, ENT, endocrine, pulmonary, cardiac, gastrointestinal, genitourinary, musculoskeletal, integument and psychiatric systems are negative, except as otherwise noted.    Patient Active Problem List    Diagnosis Date Noted     Migraine without aura and without status migrainosus, not intractable 08/23/2019     Priority: Medium     Treated with over-the-counter medications with little disability         Scoliosis 11/08/2014     Priority: Medium      No past medical history on file.  Past Surgical History:   Procedure Laterality Date     TONSILLECTOMY       WISDOM TOOTH EXTRACTION  2011     Current Outpatient Medications   Medication Sig Dispense Refill     amLODIPine (NORVASC) 5 MG tablet Take 1 tablet (5 mg) by mouth daily 30 tablet 1     nitroFURantoin macrocrystal-monohydrate (MACROBID) 100 MG capsule Take 1 capsule (100 mg) by mouth 2 times daily 10 capsule 0       Allergies   Allergen Reactions     Amoxicillin Hives     Sulfamethoxazole-Trimethoprim [Sulfamethoxazole W/Trimethoprim] Unknown     Latex Hives, Itching, Rash and Swelling        Social History     Tobacco Use     Smoking status: Never Smoker     Smokeless tobacco: Never Used   Substance Use Topics     Alcohol use: Not Currently     Family  History   Problem Relation Age of Onset     Kidney Cancer Father         age 48     Allergic rhinitis Sister      History   Drug Use Unknown         Objective     /86 (BP Location: Right arm, Patient Position: Sitting, Cuff Size: Adult Large)   Pulse 92   Temp 97.8  F (36.6  C) (Oral)   Wt 82.9 kg (182 lb 12.8 oz)   LMP 08/15/2022   SpO2 99%   BMI 33.43 kg/m      Physical Exam    GENERAL APPEARANCE: healthy, alert and no distress     EYES: EOMI, PERRL     HENT: ear canals and TM's normal and nose and mouth without ulcers or lesions     NECK: no adenopathy, no asymmetry, masses, or scars and thyroid normal to palpation     RESP: lungs clear to auscultation - no rales, rhonchi or wheezes     CV: regular rates and rhythm, normal S1 S2, no S3 or S4 and no murmur, click or rub     ABDOMEN:  soft, nontender, no HSM or masses and bowel sounds normal     MS: extremities normal- no gross deformities noted, no evidence of inflammation in joints, FROM in all extremities.     SKIN: no suspicious lesions or rashes     NEURO: Normal strength and tone, sensory exam grossly normal, mentation intact and speech normal     PSYCH: mentation appears normal. and affect normal/bright     LYMPHATICS: No cervical adenopathy    Recent Labs   Lab Test 09/15/22  0828 08/28/22  0052   HGB 12.1 12.3    284    139   POTASSIUM 3.6 3.4*   CR 0.73 0.68        Diagnostics:  Recent Results (from the past 24 hour(s))   UA Macro with Reflex to Micro and Culture - lab collect    Collection Time: 10/05/22  4:44 PM    Specimen: Urine, Clean Catch   Result Value Ref Range    Color Urine Yellow Colorless, Straw, Light Yellow, Yellow    Appearance Urine Slightly Cloudy (A) Clear    Glucose Urine Negative Negative mg/dL    Bilirubin Urine Negative Negative    Ketones Urine Negative Negative mg/dL    Specific Gravity Urine 1.020 1.005 - 1.030    Blood Urine Trace (A) Negative    pH Urine 6.5 5.0 - 8.0    Protein Albumin Urine Negative  Negative mg/dL    Urobilinogen Urine 0.2 0.2, 1.0 E.U./dL    Nitrite Urine Positive (A) Negative    Leukocyte Esterase Urine Small (A) Negative   Urine Microscopic Exam    Collection Time: 10/05/22  4:44 PM   Result Value Ref Range    Bacteria Urine Many (A) None Seen /HPF    RBC Urine 0-2 0-2 /HPF /HPF    WBC Urine 5-10 (A) 0-5 /HPF /HPF    Squamous Epithelials Urine Many (A) None Seen /LPF      No EKG required for low risk surgery (cataract, skin procedure, breast biopsy, etc).    Revised Cardiac Risk Index (RCRI):  The patient has the following serious cardiovascular risks for perioperative complications:   - No serious cardiac risks = 0 points     RCRI Interpretation: 0 points: Class I (very low risk - 0.4% complication rate)           Signed Electronically by: Lesly Emery CNP  Copy of this evaluation report is provided to requesting physician.

## 2022-10-07 LAB — BACTERIA UR CULT: ABNORMAL

## 2022-10-28 ENCOUNTER — OFFICE VISIT (OUTPATIENT)
Dept: OPHTHALMOLOGY | Facility: CLINIC | Age: 27
End: 2022-10-28
Payer: COMMERCIAL

## 2022-10-28 DIAGNOSIS — H02.886 MEIBOMIAN GLAND DYSFUNCTION (MGD) OF BOTH EYES: ICD-10-CM

## 2022-10-28 DIAGNOSIS — H02.883 MEIBOMIAN GLAND DYSFUNCTION (MGD) OF BOTH EYES: ICD-10-CM

## 2022-10-28 DIAGNOSIS — G43.009 MIGRAINE WITHOUT AURA AND WITHOUT STATUS MIGRAINOSUS, NOT INTRACTABLE: Primary | ICD-10-CM

## 2022-10-28 PROCEDURE — 92004 COMPRE OPH EXAM NEW PT 1/>: CPT | Performed by: STUDENT IN AN ORGANIZED HEALTH CARE EDUCATION/TRAINING PROGRAM

## 2022-10-28 PROCEDURE — G0463 HOSPITAL OUTPT CLINIC VISIT: HCPCS

## 2022-10-28 PROCEDURE — 92133 CPTRZD OPH DX IMG PST SGM ON: CPT | Performed by: STUDENT IN AN ORGANIZED HEALTH CARE EDUCATION/TRAINING PROGRAM

## 2022-10-28 ASSESSMENT — CONF VISUAL FIELD
OD_INFERIOR_NASAL_RESTRICTION: 0
OD_SUPERIOR_TEMPORAL_RESTRICTION: 0
OS_INFERIOR_TEMPORAL_RESTRICTION: 0
OD_NORMAL: 1
OS_INFERIOR_NASAL_RESTRICTION: 0
OS_SUPERIOR_TEMPORAL_RESTRICTION: 0
OS_SUPERIOR_NASAL_RESTRICTION: 0
OD_SUPERIOR_NASAL_RESTRICTION: 0
OS_NORMAL: 1
OD_INFERIOR_TEMPORAL_RESTRICTION: 0

## 2022-10-28 ASSESSMENT — TONOMETRY
OD_IOP_MMHG: 18
IOP_METHOD: TONOPEN
OS_IOP_MMHG: 17

## 2022-10-28 ASSESSMENT — VISUAL ACUITY
OD_SC: 20/20
OS_SC: 20/20
METHOD: SNELLEN - LINEAR

## 2022-10-28 ASSESSMENT — EXTERNAL EXAM - RIGHT EYE: OD_EXAM: WNL

## 2022-10-28 ASSESSMENT — CUP TO DISC RATIO
OD_RATIO: 0.2
OS_RATIO: 0.2

## 2022-10-28 ASSESSMENT — SLIT LAMP EXAM - LIDS
COMMENTS: MILD MGD
COMMENTS: MILD MGD

## 2022-10-28 ASSESSMENT — EXTERNAL EXAM - LEFT EYE: OS_EXAM: WNL

## 2022-10-28 NOTE — NURSING NOTE
Chief Complaints and History of Present Illnesses   Patient presents with     Consult For     Blurred vision x 2 months at times  Feel pressure at time in head  Sydney Latio [facility] 2:21 PM October 28, 2022        Chief Complaint(s) and History of Present Illness(es)     Consult For            Laterality: both eyes    Onset: gradual    Onset: 2 months ago    Quality: blurred vision    Timing: at random times    Context: distance vision, mid-range vision and near vision    Course: stable    Associated symptoms: glare.  Negative for eye pain, headache and floaters    Treatments tried: no treatments    Pain scale: 0/10    Comments: Blurred vision x 2 months at times  Feel pressure at time in head  i.Meter [facility] 2:21 PM October 28, 2022

## 2022-10-28 NOTE — LETTER
10/28/2022       RE: Mer Gonzalez  [address]  [facility] [address]     Dear Colleague,    Thank you for referring your patient, Mer Gonzalez, to the [facility] [place] at [facility]. Please see a copy of my visit note below.    .  HPI     Consult For    In both eyes.  Onset was gradual.  This started 2 months ago.  Charactertized as  blurred vision.  It is worse at random times.  Context:  distance vision, mid-range vision and near vision.  Since onset it is stable.  Associated symptoms include glare.  Negative for eye pain, headache and floaters.  Treatments tried include no treatments.  Pain was noted as 0/10. Additional comments: Blurred vision x 2 months at times  Feel pressure at time in head  Sydney Hart [facility] 2:21 PM October 28, 2022             Last edited by Sydney Hart on 10/28/2022  2:22 PM.          Review of systems for the eyes was negative other than the pertinent positives/negatives listed in the HPI.    Ocular Meds: none    Ocular Hx: none    FOHx: no family history of glaucoma or blindness    PMHx: HTN    Imaging:  MR Brain w/o and with Contrast 10/4/22  IMPRESSION:  1.  Unremarkable brain MRI without evidence for acute intracranial process.  2.  Subtle loss of the normal distal transverse venous sinus flow voids potentially corresponding with findings on recent CTV. No additional secondary findings to suggest elevated intracranial pressure or radiographic intracranial hypertension.    CTV Head and Neck w/Contrast 10/4/22  IMPRESSION:   HEAD CT:  1.  Normal head CT.     HEAD CTV:   1.  Mild to moderate narrowing of the lateral recesses bilaterally. This may be congenital or secondary to stenosis. If additional radiographic evaluation is indicated conventional catheter directed CT venogram could BE considered.  2.  Negative for dural venous sinus thrombosis.    Assessment & Plan     Mer Gonzalez is a 27 year old female  with the following diagnoses:    1. Migraine without aura and without status migrainosus, not intractable       - Patient referred by neurologist Dr Mckenzie to rule out papilledema  - denies recent weight gain, denies OCP, denies use of Vitamin A products or derivatives; denies use of minocycline  - reports vision is intermittently blurry, but states no tinnitus at this time, no  Transient visual obscurations  - Examination with no signs of optic nerve head edema in both eyes; CVF full to CF; color plates full OU; excellent distance vision without correction, tech eval of pupils did not reveal RAPD OU  - though unable to view for spontaneous venous pulsations (SVP) on clinical examination; IR video of optic nerve head able to capture SVP (visualized in left eye); in the presence of SVP this suggests that there is low probability for intracranial pressure to be elevated  - defer further work up and management to primary/neurology if pseudotumor cerebri still remains a concern, with consideration of lumbar puncture in the left lateral decubitus position to obtain opening pressure  - patient to do warm compresses BID OU x 5-10 min to eyelids and start ATs prn OU for mild mgd on exam which may help with patient's intermittent blurry vision    Patient disposition:   Return in about 1 year (around 10/28/2023) for Annual Visit, or sooner changes.    Attending Physician Attestation:  Complete documentation of historical and exam elements from today's encounter can be found in the full encounter summary report (not reduplicated in this progress note).  I personally obtained the chief complaint(s) and history of present illness.  I confirmed and edited as necessary the review of systems, past medical/surgical history, family history, social history, and examination findings as documented by others; and I examined the patient myself.  I personally reviewed the relevant tests, images, and reports as documented above.  I  formulated and edited as necessary the assessment and plan and discussed the findings and management plan with the patient and family. . - Francisca Soriano MD          Again, thank you for allowing me to participate in the care of your patient.      Sincerely,    Francisca Soriano MD

## 2022-10-28 NOTE — PROGRESS NOTES
.  HPI     Consult For    In both eyes.  Onset was gradual.  This started 2 months ago.  Charactertized as  blurred vision.  It is worse at random times.  Context:  distance vision, mid-range vision and near vision.  Since onset it is stable.  Associated symptoms include glare.  Negative for eye pain, headache and floaters.  Treatments tried include no treatments.  Pain was noted as 0/10. Additional comments: Blurred vision x 2 months at times  Feel pressure at time in head  Sydney Hart [facility] 2:21 PM October 28, 2022             Last edited by Sydney Hart on 10/28/2022  2:22 PM.          Review of systems for the eyes was negative other than the pertinent positives/negatives listed in the HPI.    Ocular Meds: none    Ocular Hx: none    FOHx: no family history of glaucoma or blindness    PMHx: HTN    Imaging:  MR Brain w/o and with Contrast 10/4/22  IMPRESSION:  1.  Unremarkable brain MRI without evidence for acute intracranial process.  2.  Subtle loss of the normal distal transverse venous sinus flow voids potentially corresponding with findings on recent CTV. No additional secondary findings to suggest elevated intracranial pressure or radiographic intracranial hypertension.    CTV Head and Neck w/Contrast 10/4/22  IMPRESSION:   HEAD CT:  1.  Normal head CT.     HEAD CTV:   1.  Mild to moderate narrowing of the lateral recesses bilaterally. This may be congenital or secondary to stenosis. If additional radiographic evaluation is indicated conventional catheter directed CT venogram could BE considered.  2.  Negative for dural venous sinus thrombosis.    Assessment & Plan      Mer Gonzalez is a 27 year old female with the following diagnoses:    1. Migraine without aura and without status migrainosus, not intractable       - Patient referred by neurologist Dr Mckenzie to rule out papilledema  - denies recent weight gain, denies OCP, denies use of Vitamin A products or derivatives; denies use of minocycline  -  reports vision is intermittently blurry, but states no tinnitus at this time, no  Transient visual obscurations  - Examination with no signs of optic nerve head edema in both eyes; CVF full to CF; color plates full OU; excellent distance vision without correction, tech eval of pupils did not reveal RAPD OU  - though unable to view for spontaneous venous pulsations (SVP) on clinical examination; IR video of optic nerve head able to capture SVP (visualized in left eye); in the presence of SVP this suggests that there is low probability for intracranial pressure to be elevated  - defer further work up and management to primary/neurology if pseudotumor cerebri still remains a concern, with consideration of lumbar puncture in the left lateral decubitus position to obtain opening pressure  - patient to do warm compresses BID OU x 5-10 min to eyelids and start ATs prn OU for mild mgd on exam which may help with patient's intermittent blurry vision    Patient disposition:   Return in about 1 year (around 10/28/2023) for Annual Visit, or sooner changes.    Attending Physician Attestation:  Complete documentation of historical and exam elements from today's encounter can be found in the full encounter summary report (not reduplicated in this progress note).  I personally obtained the chief complaint(s) and history of present illness.  I confirmed and edited as necessary the review of systems, past medical/surgical history, family history, social history, and examination findings as documented by others; and I examined the patient myself.  I personally reviewed the relevant tests, images, and reports as documented above.  I formulated and edited as necessary the assessment and plan and discussed the findings and management plan with the patient and family. . - Francisca Soriano MD

## 2022-11-22 ENCOUNTER — VIRTUAL VISIT (OUTPATIENT)
Dept: NEUROLOGY | Facility: CLINIC | Age: 27
End: 2022-11-22
Payer: COMMERCIAL

## 2022-11-22 DIAGNOSIS — R42 DIZZINESS: Primary | ICD-10-CM

## 2022-11-22 PROCEDURE — 99214 OFFICE O/P EST MOD 30 MIN: CPT | Mod: GT | Performed by: PSYCHIATRY & NEUROLOGY

## 2022-11-22 NOTE — PROGRESS NOTES
"[facility] Neurology Follow Up Visit    Mer Gonzalez MRN# [id number]   Age: 27 year old YOB: 1995     Brief history of symptoms: The patient was initially seen in neurologic consultation on 9/21/2022 for evaluation of dizziness and arm heaviness. Please see the comprehensive neurologic consultation notes from those dates in the Epic records for details.     The patient had multiple symptoms reported; light-headedness, tinnitus that was whistling and pulsatile, b/l vision blurring/changes.  To better identify a unifying diagnosis, CTV head and neck along with MRI brain was to be done to look for jugular venous stenosis/compression, or signs of increased intracranial pressure.    Interval history:   - MRI brain w/wout contrast 10/4/2022 was unremarkable upon review (no tumor, no infarction, no signs of edema,, and no signs of increased ICP)  - CTV head and neck 10/4/2022 showed mild symmetric narrowing of the transverse sinus b/l, but no specific jugular venous stenosis.  - Ophthalmology visit 10/28/2022 did not reveal signs of optic nerve edema or ICP    Today, the patient reports that her symptoms are fizzling out, and only occurring once a week. She gets a head pressure and feels \"out of it\" still, lasting about a minute to five minutes.  The pressure then goes away without any major side-effects of nausea, vomiting, photophobia, phonophobia, or headache.  The pressure is not positional.     Physical Exam:   General: Seated comfortably in no acute distress. Pleasant disposition.  HEENT: Neck supple with normal range of motion.   Skin: No rashes  Neurologic:     Mental Status: Fully alert, attentive and oriented. Speech clear and fluent, no paraphasic errors.     Cranial Nerves: EOMI with normal smooth pursuit. Facial movements symmetric. Hearing not formally tested but intact to conversation.  No dysarthria.     Motor: No tremors or other abnormal movements observed.          Assessment and Plan: "   Assessment:  Head pressure    The patient's symptoms are relatively fast in onset and short in duration, without a neuralgiform presentation. Given her imaging, and ophthalmology exam, I am less concerned for ICP related changes being the etiology with her current state.  I would watch and wait at this time given the lack of define etiology or diagnosis, and overall improvement of symptoms over times without intervention.  Should she develop a longer duration of symptoms, meet criteria for a headache or migraine disorder, or develop new deficits with her symptoms, then treatment may be needed.      Plan:  Follow up with me in 6 months to see if symptoms progress over time or become more apparent, requiring further treatment    BECKA Mckenzie D.O.   of Neurology    Total time today (30 min) in this patient encounter was spent on pre-charting, counseling and/or coordination of care.

## 2022-11-22 NOTE — LETTER
"11/22/2022       RE: Mer Gonzalez  [address]  [facility] [address]     Dear Colleague,    Thank you for referring your patient, Mer Gonzalez, to the [facility]UROLOG[facility] [place] at [facility]. Please see a copy of my visit note below.    [facility] Neurology Follow Up Visit    Mer Gonzalez MRN# [id number]   Age: 27 year old YOB: 1995     Brief history of symptoms: The patient was initially seen in neurologic consultation on 9/21/2022 for evaluation of dizziness and arm heaviness. Please see the comprehensive neurologic consultation notes from those dates in the Epic records for details.     The patient had multiple symptoms reported; light-headedness, tinnitus that was whistling and pulsatile, b/l vision blurring/changes.  To better identify a unifying diagnosis, CTV head and neck along with MRI brain was to be done to look for jugular venous stenosis/compression, or signs of increased intracranial pressure.    Interval history:   - MRI brain w/wout contrast 10/4/2022 was unremarkable upon review (no tumor, no infarction, no signs of edema,, and no signs of increased ICP)  - CTV head and neck 10/4/2022 showed mild symmetric narrowing of the transverse sinus b/l, but no specific jugular venous stenosis.  - Ophthalmology visit 10/28/2022 did not reveal signs of optic nerve edema or ICP    Today, the patient reports that her symptoms are fizzling out, and only occurring once a week. She gets a head pressure and feels \"out of it\" still, lasting about a minute to five minutes.  The pressure then goes away without any major side-effects of nausea, vomiting, photophobia, phonophobia, or headache.  The pressure is not positional.     Physical Exam:   General: Seated comfortably in no acute distress. Pleasant disposition.  HEENT: Neck supple with normal range of motion.   Skin: No rashes  Neurologic:     Mental Status: Fully alert, attentive " and oriented. Speech clear and fluent, no paraphasic errors.     Cranial Nerves: EOMI with normal smooth pursuit. Facial movements symmetric. Hearing not formally tested but intact to conversation.  No dysarthria.     Motor: No tremors or other abnormal movements observed.          Assessment and Plan:   Assessment:  Head pressure    The patient's symptoms are relatively fast in onset and short in duration, without a neuralgiform presentation. Given her imaging, and ophthalmology exam, I am less concerned for ICP related changes being the etiology with her current state.  I would watch and wait at this time given the lack of define etiology or diagnosis, and overall improvement of symptoms over times without intervention.  Should she develop a longer duration of symptoms, meet criteria for a headache or migraine disorder, or develop new deficits with her symptoms, then treatment may be needed.      Plan:  Follow up with me in 6 months to see if symptoms progress over time or become more apparent, requiring further treatment      BECKA Mckenzie D.O.   of Neurology    Total time today (30 min) in this patient encounter was spent on pre-charting, counseling and/or coordination of care.

## 2022-11-22 NOTE — PROGRESS NOTES
Mer is a 27 year old who is being evaluated via a billable video visit.      How would you like to obtain your AVS? Mychart  If the video visit is dropped, the invitation should be resent by: [phone]        Video-Visit Details    Video Start Time: 0730    Type of service:  Video Visit    Video End Time:7:56 AM    Originating Location (pt. Location): Home        Distant Location (provider location):  On-site    Platform used for Video Visit: [employer]

## 2022-11-27 ENCOUNTER — OFFICE VISIT (OUTPATIENT)
Dept: FAMILY MEDICINE | Facility: CLINIC | Age: 27
End: 2022-11-27
Payer: COMMERCIAL

## 2022-11-27 ENCOUNTER — HOSPITAL ENCOUNTER (OUTPATIENT)
Dept: CT IMAGING | Facility: CLINIC | Age: 27
Discharge: HOME | End: 2022-11-27
Attending: PHYSICIAN ASSISTANT | Admitting: PHYSICIAN ASSISTANT
Payer: COMMERCIAL

## 2022-11-27 VITALS
DIASTOLIC BLOOD PRESSURE: 87 MMHG | OXYGEN SATURATION: 100 % | HEART RATE: 83 BPM | BODY MASS INDEX: 32.37 KG/M2 | SYSTOLIC BLOOD PRESSURE: 135 MMHG | RESPIRATION RATE: 14 BRPM | TEMPERATURE: 98.6 F | WEIGHT: 177 LBS

## 2022-11-27 DIAGNOSIS — K59.00 CONSTIPATION, UNSPECIFIED CONSTIPATION TYPE: ICD-10-CM

## 2022-11-27 DIAGNOSIS — R10.32 ABDOMINAL PAIN, LEFT LOWER QUADRANT: Primary | ICD-10-CM

## 2022-11-27 LAB
ALBUMIN UR-MCNC: ABNORMAL MG/DL
APPEARANCE UR: CLEAR
BACTERIA #/AREA URNS HPF: ABNORMAL /HPF
BASOPHILS # BLD AUTO: 0.1 10E3/UL (ref 0–0.2)
BASOPHILS NFR BLD AUTO: 1 %
BILIRUB UR QL STRIP: NEGATIVE
COLOR UR AUTO: YELLOW
EOSINOPHIL # BLD AUTO: 0.2 10E3/UL (ref 0–0.7)
EOSINOPHIL NFR BLD AUTO: 3 %
ERYTHROCYTE [DISTWIDTH] IN BLOOD BY AUTOMATED COUNT: 14.2 % (ref 10–15)
GLUCOSE UR STRIP-MCNC: NEGATIVE MG/DL
HCT VFR BLD AUTO: 37.9 % (ref 35–47)
HGB BLD-MCNC: 12.6 G/DL (ref 11.7–15.7)
HGB UR QL STRIP: NEGATIVE
IMM GRANULOCYTES # BLD: 0 10E3/UL
IMM GRANULOCYTES NFR BLD: 0 %
KETONES UR STRIP-MCNC: NEGATIVE MG/DL
LEUKOCYTE ESTERASE UR QL STRIP: ABNORMAL
LYMPHOCYTES # BLD AUTO: 2.3 10E3/UL (ref 0.8–5.3)
LYMPHOCYTES NFR BLD AUTO: 38 %
MCH RBC QN AUTO: 26.5 PG (ref 26.5–33)
MCHC RBC AUTO-ENTMCNC: 33.2 G/DL (ref 31.5–36.5)
MCV RBC AUTO: 80 FL (ref 78–100)
MONOCYTES # BLD AUTO: 0.5 10E3/UL (ref 0–1.3)
MONOCYTES NFR BLD AUTO: 9 %
MUCOUS THREADS #/AREA URNS LPF: PRESENT /LPF
NEUTROPHILS # BLD AUTO: 3 10E3/UL (ref 1.6–8.3)
NEUTROPHILS NFR BLD AUTO: 50 %
NITRATE UR QL: NEGATIVE
PH UR STRIP: 6.5 [PH] (ref 5–8)
PLATELET # BLD AUTO: 264 10E3/UL (ref 150–450)
RBC # BLD AUTO: 4.75 10E6/UL (ref 3.8–5.2)
RBC #/AREA URNS AUTO: ABNORMAL /HPF
SP GR UR STRIP: 1.02 (ref 1–1.03)
SQUAMOUS #/AREA URNS AUTO: ABNORMAL /LPF
UROBILINOGEN UR STRIP-ACNC: 0.2 E.U./DL
WBC # BLD AUTO: 6 10E3/UL (ref 4–11)
WBC #/AREA URNS AUTO: ABNORMAL /HPF

## 2022-11-27 PROCEDURE — 80076 HEPATIC FUNCTION PANEL: CPT | Performed by: PHYSICIAN ASSISTANT

## 2022-11-27 PROCEDURE — 81001 URINALYSIS AUTO W/SCOPE: CPT | Performed by: PHYSICIAN ASSISTANT

## 2022-11-27 PROCEDURE — 250N000011 HC RX IP 250 OP 636: Performed by: PHYSICIAN ASSISTANT

## 2022-11-27 PROCEDURE — 36415 COLL VENOUS BLD VENIPUNCTURE: CPT | Performed by: PHYSICIAN ASSISTANT

## 2022-11-27 PROCEDURE — 99214 OFFICE O/P EST MOD 30 MIN: CPT | Performed by: PHYSICIAN ASSISTANT

## 2022-11-27 PROCEDURE — 74177 CT ABD & PELVIS W/CONTRAST: CPT

## 2022-11-27 PROCEDURE — 85025 COMPLETE CBC W/AUTO DIFF WBC: CPT | Performed by: PHYSICIAN ASSISTANT

## 2022-11-27 RX ORDER — IOPAMIDOL 755 MG/ML
100 INJECTION, SOLUTION INTRAVASCULAR ONCE
Status: COMPLETED | OUTPATIENT
Start: 2022-11-27 | End: 2022-11-27

## 2022-11-27 RX ADMIN — IOPAMIDOL 100 ML: 755 INJECTION, SOLUTION INTRAVENOUS at 14:07

## 2022-11-27 NOTE — PROGRESS NOTES
Assessment & Plan:      Problem List Items Addressed This Visit    None  Visit Diagnoses     Abdominal pain, left lower quadrant    -  Primary    Relevant Orders    CBC with platelets and differential (Completed)    UA macro with reflex to Microscopic and Culture - Clinc Collect (Completed)    CT Abdomen Pelvis w Contrast (Completed)    Urine Microscopic (Completed)    Constipation, unspecified constipation type            Medical Decision Making  Patient presents with ongoing lower abdominal pains for 1 to 2 months.  Initial concerns for possible diverticulitis versus appendicitis.  White blood cell count is normal.  Urine analysis is negative for UTI.  Abdominal CT is negative for signs of diverticulitis and appendicitis.  Patient did recently have polyp removal from the uterus.  There is still some remaining fluid seen on CT.  This could be contributing to some of patient's pains, however she had symptoms before the polyp procedure.  There is also moderate amount of stool seen on CT.  This could be consistent with mild to moderate constipation.  Recommend continuing with daily laxatives, fluids, and increasing fiber in diet.  Discussed treatment and symptomatic care.  Allergies and medication interactions reviewed.  Discussed signs of worsening symptoms and when to follow-up with PCP if no symptom improvement.     Subjective:      Mer Gonzalez is a 27 year old female here for evaluation of left lower quadrant abdominal pains.  Onset of symptoms was 1 to 2 months ago.  Patient has noted worsening symptoms over the last week.  She will note occasional sharp pains primarily in the left lower quadrant.  Patient recently had a procedure to remove a uterine polyp 1 to 2 weeks ago.  Her symptoms began before this, and have persisted ever since.  Associated symptoms include nausea.  No emesis or fevers.  No dysuria or increased urinary frequency.  Patient did try a short course of laxatives as she did notice a change  Outpatient Medications Marked as Taking for the 5/16/22 encounter (Telephone) with Leigh Ac, DO   Medication Sig Dispense Refill   • nystatin (MYCOSTATIN) [id number] UNIT/GM cream Apply topically to affected areas under breasts daily. (Maintenance) 30 g 3         No call back needed unless nurse has questions.     Pharmacy: Fax   in bowel movements and and difficulties passing stools.  However, this treatment did not help symptoms.     The following portions of the patient's history were reviewed and updated as appropriate: allergies, current medications, and problem list.     Review of Systems  Pertinent items are noted in HPI.    Allergies  Allergies   Allergen Reactions     Amoxicillin Hives     Cephalexin      Other reaction(s): brain fog, emotional     Sulfamethoxazole-Trimethoprim [Sulfamethoxazole W/Trimethoprim] Unknown     Latex Hives, Itching, Rash and Swelling       Family History   Problem Relation Age of Onset     Kidney Cancer Father         age 48     Allergic rhinitis Sister        Social History     Tobacco Use     Smoking status: Never     Smokeless tobacco: Never   Substance Use Topics     Alcohol use: Not Currently        Objective:      /87   Pulse 83   Temp 98.6  F (37  C) (Oral)   Resp 14   Wt 80.3 kg (177 lb)   SpO2 100%   BMI 32.37 kg/m    General appearance - alert, well appearing, and in no distress and non-toxic  Abdomen - Tenderness to palpation throughout the lower abdomen, with worse tenderness in the left lower quadrant, otherwise abdomen soft, normal bowel sounds, no masses or organomegaly  Back exam - No CVA tenderness     Lab & Imaging Results    Results for orders placed or performed in visit on 11/27/22   CT Abdomen Pelvis w Contrast     Status: None    Narrative    EXAM: CT ABDOMEN PELVIS W CONTRAST  LOCATION: [facility]  DATE/TIME: 11/27/2022 2:14 PM    INDICATION:  Abdominal pain, left lower quadrant  COMPARISON: None.  TECHNIQUE: CT scan of the abdomen and pelvis was performed following injection of IV contrast. Multiplanar reformats were obtained. Dose reduction techniques were used.  CONTRAST: Omnipaque 350    FINDINGS:   LOWER CHEST: Normal.    HEPATOBILIARY: No intrahepatic biliary ductal dilatation. The liver measures 15.7 cm in size. No intrahepatic biliary  ductal dilatation.    PANCREAS: Normal.    SPLEEN: Normal.    ADRENAL GLANDS: Normal.    KIDNEYS/BLADDER: Normal.    BOWEL: The small and large bowel is normal in caliber. A moderate amount of stool is identified within the colon. No free air or free fluid.    LYMPH NODES: Normal.    VASCULATURE: Unremarkable.    PELVIC ORGANS: Fluid is identified within the endometrial canal. Several follicles identified on the right ovary.    MUSCULOSKELETAL: Normal.      Impression    IMPRESSION:   1.  Fluid identified within the endometrial canal of the uterus.   2.  No additional acute findings of the abdomen or pelvis.   UA macro with reflex to Microscopic and Culture - Clinc Collect     Status: Abnormal    Specimen: Urine, Clean Catch   Result Value Ref Range    Color Urine Yellow Colorless, Straw, Light Yellow, Yellow    Appearance Urine Clear Clear    Glucose Urine Negative Negative mg/dL    Bilirubin Urine Negative Negative    Ketones Urine Negative Negative mg/dL    Specific Gravity Urine 1.025 1.005 - 1.030    Blood Urine Negative Negative    pH Urine 6.5 5.0 - 8.0    Protein Albumin Urine Trace (A) Negative mg/dL    Urobilinogen Urine 0.2 0.2, 1.0 E.U./dL    Nitrite Urine Negative Negative    Leukocyte Esterase Urine Trace (A) Negative   CBC with platelets and differential     Status: None   Result Value Ref Range    WBC Count 6.0 4.0 - 11.0 10e3/uL    RBC Count 4.75 3.80 - 5.20 10e6/uL    Hemoglobin 12.6 11.7 - 15.7 g/dL    Hematocrit 37.9 35.0 - 47.0 %    MCV 80 78 - 100 fL    MCH 26.5 26.5 - 33.0 pg    MCHC 33.2 31.5 - 36.5 g/dL    RDW 14.2 10.0 - 15.0 %    Platelet Count 264 150 - 450 10e3/uL    % Neutrophils 50 %    % Lymphocytes 38 %    % Monocytes 9 %    % Eosinophils 3 %    % Basophils 1 %    % Immature Granulocytes 0 %    Absolute Neutrophils 3.0 1.6 - 8.3 10e3/uL    Absolute Lymphocytes 2.3 0.8 - 5.3 10e3/uL    Absolute Monocytes 0.5 0.0 - 1.3 10e3/uL    Absolute Eosinophils 0.2 0.0 - 0.7 10e3/uL    Absolute  Basophils 0.1 0.0 - 0.2 10e3/uL    Absolute Immature Granulocytes 0.0 <=0.4 10e3/uL   Urine Microscopic     Status: Abnormal   Result Value Ref Range    Bacteria Urine Few (A) None Seen /HPF    RBC Urine 0-2 0-2 /HPF /HPF    WBC Urine 5-10 (A) 0-5 /HPF /HPF    Squamous Epithelials Urine Moderate (A) None Seen /LPF    Mucus Urine Present (A) None Seen /LPF    Narrative    Urine Culture not indicated   CBC with platelets and differential     Status: None    Narrative    The following orders were created for panel order CBC with platelets and differential.  Procedure                               Abnormality         Status                     ---------                               -----------         ------                     CBC with platelets and d...[[id number]]                      Final result                 Please view results for these tests on the individual orders.       I personally reviewed these results and discussed findings with the patient.    The use of Dragon[employer] services was used to construct the content of this note; any grammatical errors are non-intentional. Please contact the author directly if you are in need of any clarification.

## 2022-11-28 LAB
ALBUMIN SERPL BCG-MCNC: 4.5 G/DL (ref 3.5–5.2)
ALP SERPL-CCNC: 66 U/L (ref 35–104)
ALT SERPL W P-5'-P-CCNC: 15 U/L (ref 10–35)
AST SERPL W P-5'-P-CCNC: 18 U/L (ref 10–35)
BILIRUB DIRECT SERPL-MCNC: <0.2 MG/DL (ref 0–0.3)
BILIRUB SERPL-MCNC: 0.8 MG/DL
PROT SERPL-MCNC: 7.2 G/DL (ref 6.4–8.3)

## 2023-01-06 ENCOUNTER — OFFICE VISIT (OUTPATIENT)
Dept: INTERNAL MEDICINE | Facility: CLINIC | Age: 28
End: 2023-01-06
Payer: COMMERCIAL

## 2023-01-06 VITALS
HEART RATE: 94 BPM | RESPIRATION RATE: 16 BRPM | SYSTOLIC BLOOD PRESSURE: 136 MMHG | BODY MASS INDEX: 33.13 KG/M2 | WEIGHT: 180 LBS | TEMPERATURE: 98 F | DIASTOLIC BLOOD PRESSURE: 88 MMHG | OXYGEN SATURATION: 100 % | HEIGHT: 62 IN

## 2023-01-06 DIAGNOSIS — I10 ESSENTIAL HYPERTENSION: ICD-10-CM

## 2023-01-06 DIAGNOSIS — H69.93 DYSFUNCTION OF BOTH EUSTACHIAN TUBES: ICD-10-CM

## 2023-01-06 DIAGNOSIS — R22.32 AXILLARY LUMP, LEFT: ICD-10-CM

## 2023-01-06 PROCEDURE — 99214 OFFICE O/P EST MOD 30 MIN: CPT | Performed by: NURSE PRACTITIONER

## 2023-01-06 RX ORDER — ALBUTEROL SULFATE 90 UG/1
2 AEROSOL, METERED RESPIRATORY (INHALATION) EVERY 6 HOURS PRN
Qty: 18 G | COMMUNITY
Start: 2023-01-06 | End: 2023-10-03

## 2023-01-06 NOTE — PATIENT INSTRUCTIONS
Continue your blood pressure medication as is.    To schedule your ultrasound of your left axilla call [phone].    For the eustachian tube dysfunction, fluid behind your left ear try Flonase 1 spray each nostril twice a day, and daily Zyrtec/cetirizine (generic) over-the-counter for the next 2 weeks.  If this is not helpful please send me a hetrast message and we can send you to ENT for further evaluation.    Lets plan on seeing you back in April for your physical with fasting labs, before then if anything comes up.

## 2023-01-06 NOTE — PROGRESS NOTES
"  Assessment & Plan     Essential hypertension: Well controlled with Norvasc. Blood pressure today was 130/80. Stable.     Axillary lump, left: new onset squishy painful lump in armpit, suspect possible inflamed node or blocked sweat duct. Will check an US.   - US Axillary Left    Dysfunction of both eustachian tubes: Slight dizziness when rolling over in AM, slight fluid noted behind both ears. Suspected Eustachian tube dysfunction. To restart her cetirizine and flonase. Follow up if symptoms persist or worsen.     BMI:   Estimated body mass index is 32.92 kg/m  as calculated from the following:    Height as of this encounter: 1.575 m (5' 2\").    Weight as of this encounter: 81.6 kg (180 lb).   Weight management plan: Discussed healthy diet and exercise guidelines    Return in about 4 months (around 5/6/2023) for Follow up, Routine preventive.    YURIY Atkins [facility]    Isaac Del Angel is a 27 year old presenting for the following health issues:  Follow Up      The patient presents today for a blood pressure check. She has been tolerating the Norvasc well.    She also noticed a left armpit lump that is painful to palpation that just appeared. No recent COVID shots, no other changes known, no drainage, fever, chills, nausea, or vomiting.    She reports also having a couple seconds of dizziness when she gets up in the AM and rolls over. It only lasts for a few seconds. But it has been bothersome.     History of Present Illness       Hypertension: She presents for follow up of hypertension.  She does not check blood pressure  regularly outside of the clinic. Outpatient blood pressures have not been over 140/90. She follows a low salt diet.       Review of Systems   Constitutional, HEENT, cardiovascular, pulmonary, GI, , musculoskeletal, neuro, skin, endocrine and psych systems are negative, except as otherwise noted.      Objective    /88 (BP Location: Right " "arm, Patient Position: Sitting)   Pulse 94   Temp 98  F (36.7  C)   Resp 16   Ht 1.575 m (5' 2\")   Wt 81.6 kg (180 lb)   SpO2 100%   BMI 32.92 kg/m    Body mass index is 32.92 kg/m .  Physical Exam  Vitals and nursing note reviewed.   Constitutional:       Appearance: Normal appearance. She is normal weight.   Chest:       Neurological:      Mental Status: She is alert.        GENERAL: healthy, alert and no distress  EYES: Eyes grossly normal to inspection, PERRL and conjunctivae and sclerae normal  HENT: Scant fluid behind TM's nose and mouth without ulcers or lesions  NECK: no adenopathy, no asymmetry, masses, or scars  RESP: lungs clear to auscultation - no rales, rhonchi or wheezes  CV: regular rate and rhythm, normal S1 S2, no S3 or S4, no murmur, click or rub, no peripheral edema and peripheral pulses strong  MS: no gross musculoskeletal defects noted, no edema  SKIN: no suspicious lesions or rashes  NEURO: Normal strength and tone, mentation intact and speech normal  PSYCH: mentation appears normal, affect normal/bright        "

## 2023-01-12 ENCOUNTER — HOSPITAL ENCOUNTER (OUTPATIENT)
Dept: MAMMOGRAPHY | Facility: CLINIC | Age: 28
Discharge: HOME | End: 2023-01-12
Attending: NURSE PRACTITIONER | Admitting: NURSE PRACTITIONER
Payer: COMMERCIAL

## 2023-01-12 DIAGNOSIS — R22.32 AXILLARY LUMP, LEFT: ICD-10-CM

## 2023-01-12 PROCEDURE — 76642 ULTRASOUND BREAST LIMITED: CPT | Mod: LT

## 2023-01-20 ENCOUNTER — E-VISIT (OUTPATIENT)
Dept: INTERNAL MEDICINE | Facility: CLINIC | Age: 28
End: 2023-01-20
Payer: COMMERCIAL

## 2023-01-20 DIAGNOSIS — N39.0 ACUTE UTI (URINARY TRACT INFECTION): Primary | ICD-10-CM

## 2023-01-20 PROCEDURE — 99421 OL DIG E/M SVC 5-10 MIN: CPT | Performed by: NURSE PRACTITIONER

## 2023-01-20 RX ORDER — NITROFURANTOIN 25; 75 MG/1; MG/1
100 CAPSULE ORAL 2 TIMES DAILY
Qty: 10 CAPSULE | Refills: 0 | Status: SHIPPED | OUTPATIENT
Start: 2023-01-20 | End: 2023-01-25

## 2023-01-20 NOTE — PATIENT INSTRUCTIONS
Dear Mer Gonzalez    After reviewing your responses, I've been able to diagnose you with a urinary tract infection, which is a common infection of the bladder with bacteria.  This is not a sexually transmitted infection, though urinating immediately after intercourse can help prevent infections.  Drinking lots of fluids is also helpful to clear your current infection and prevent the next one.      I have sent a prescription for antibiotics to your pharmacy to treat this infection.    It is important that you take all of your prescribed medication even if your symptoms are improving after a few doses.  Taking all of your medicine helps prevent the symptoms from returning.     If your symptoms worsen, you develop pain in your back or stomach, develop fevers, or are not improving in 5 days, please contact your primary care provider for an appointment or visit any of our convenient Walk-in or Urgent Care Centers to be seen, which can be found on our website here.    Thanks again for choosing us as your health care partner,    Lesly Emery, CNP    Urinary Tract Infections in Women  Urinary tract infections (UTIs) are most often caused by bacteria. These bacteria enter the urinary tract. The bacteria may come from inside the body. Or they may travel from the skin outside the rectum or vagina into the urethra. Female anatomy makes it easy for bacteria from the bowel to enter a woman s urinary tract, which is the most common source of UTI. This means women develop UTIs more often than men. Pain in or around the urinary tract is a common UTI symptom. But the only way to know for sure if you have a UTI for the healthcare provider to test your urine. The two tests that may be done are the urinalysis and urine culture.     Types of UTIs    Cystitis. A bladder infection (cystitis) is the most common UTI in women. You may have urgent or frequent need to pee. You may also have pain, burning when you pee, and bloody  urine.    Urethritis. This is an inflamed urethra, which is the tube that carries urine from the bladder to outside the body. You may have lower stomach or back pain. You may also have urgent or frequent need to pee.    Pyelonephritis. This is a kidney infection. If not treated, it can be serious and damage your kidneys. In severe cases, you may need to stay in the hospital. You may have a fever and lower back pain.    Medicines to treat a UTI  Most UTIs are treated with antibiotics. These kill the bacteria. The length of time you need to take them depends on the type of infection. It may be as short as 3 days. If you have repeated UTIs, you may need a low-dose antibiotic for several months. Take antibiotics exactly as directed. Don t stop taking them until all of the medicine is gone. If you stop taking the antibiotic too soon, the infection may not go away. You may also develop a resistance to the antibiotic. This can make it much harder to treat.   Lifestyle changes to treat and prevent UTIs   The lifestyle changes below will help get rid of your UTI. They may also help prevent future UTIs.     Drink plenty of fluids. This includes water, juice, or other caffeine-free drinks. Fluids help flush bacteria out of your body.    Empty your bladder. Always empty your bladder when you feel the urge to pee. And always pee before going to sleep. Urine that stays in your bladder can lead to infection. Try to pee before and after sex as well.    Practice good personal hygiene. Wipe yourself from front to back after using the toilet. This helps keep bacteria from getting into the urethra.    Use condoms during sex. These help prevent UTIs caused by sexually transmitted bacteria. Also don't use spermicides during sex. These can increase the risk for UTIs. Choose other forms of birth control instead. For women who tend to get UTIs after sex, a low-dose of a preventive antibiotic may be used. Be sure to discuss this option with  your healthcare provider.    Follow up with your healthcare provider as directed. He or she may test to make sure the infection has cleared. If needed, more treatment may be started.  Keri last reviewed this educational content on 7/1/2019 2000-2021 [employer]. All rights reserved. This information is not intended as a substitute for professional medical care. Always follow your healthcare professional's instructions.

## 2023-02-02 ENCOUNTER — OFFICE VISIT (OUTPATIENT)
Dept: FAMILY MEDICINE | Facility: CLINIC | Age: 28
End: 2023-02-02
Payer: COMMERCIAL

## 2023-02-02 VITALS
HEART RATE: 80 BPM | DIASTOLIC BLOOD PRESSURE: 82 MMHG | SYSTOLIC BLOOD PRESSURE: 120 MMHG | BODY MASS INDEX: 32.92 KG/M2 | WEIGHT: 180 LBS | TEMPERATURE: 98.1 F

## 2023-02-02 DIAGNOSIS — H00.014 HORDEOLUM EXTERNUM OF LEFT UPPER EYELID: Primary | ICD-10-CM

## 2023-02-02 DIAGNOSIS — Z23 NEED FOR VACCINATION: ICD-10-CM

## 2023-02-02 PROCEDURE — 99213 OFFICE O/P EST LOW 20 MIN: CPT

## 2023-02-02 ASSESSMENT — ENCOUNTER SYMPTOMS
EYE DISCHARGE: 0
EYE REDNESS: 0

## 2023-02-02 NOTE — PROGRESS NOTES
Assessment & Plan     Hordeolum externum of left upper eyelid  Mildly erythematous semiformed 3 to 4 mm nodule on upper left medial eyelid.  No purulent or otherwise discharge noted. uniform color.  Has applied warm moist compress, and had resolution of similar stye on lower lid in past 2 weeks.  Encouraged to increased warm moist compresses to eye 3-4 times a day.  Encouraged to follow-up in 1 week if symptoms not improving, or if discharge or visual changes develop.  Patient encouraged to use artificial tears for comfort as needed.  Discussed that antibiotic drops are not recommended at this time.    Need for vaccination  Recommendation for vaccination discussed with patient.  She declines at this time.                 Return if symptoms worsen or fail to improve.    LESLIE Reyes [facility]    Subjective   Mer is a 27 year old, presenting for the following health issues:  Eye Problem (L eye. Started 2 weeks ago. Started bottom of eyelid, resolved, then came back to upper lid. )      Mer is a 27-year-old female ambulatory to clinic with complaints of a swollen area on her left upper eyelid.  She reports she had a similar swollen lump on her lower eyelid that has since resolved and was painless.  She reports a mild amount of pain with lump to upper left medial eyelid she denies any other symptoms no fevers no purulent discharge.  She has tried intermittent warm moist compresses.  No previous episodes reported.  No changes to her vision.  She denies any fevers.    History of Present Illness       Reason for visit:  Swollen and irratated eye  Symptom onset:  1-2 weeks ago  Symptom intensity:  Moderate  Symptom progression:  Worsening  Had these symptoms before:  No    She eats 2-3 servings of fruits and vegetables daily.She consumes 0 sweetened beverage(s) daily.She exercises with enough effort to increase her heart rate 9 or less minutes per day.  She exercises with  enough effort to increase her heart rate 3 or less days per week.   She is taking medications regularly.             Review of Systems   Eyes: Negative for discharge, redness and visual disturbance.   All other systems reviewed and are negative.           Objective    /82 (BP Location: Right arm, Patient Position: Sitting, Cuff Size: Adult Large)   Pulse 80   Temp 98.1  F (36.7  C) (Tympanic)   Wt 81.6 kg (180 lb)   BMI 32.92 kg/m    Body mass index is 32.92 kg/m .  Physical Exam  Constitutional:       Appearance: Normal appearance.   Eyes:      General:         Left eye: Hordeolum present.    Neurological:      Mental Status: She is alert.

## 2023-02-17 ENCOUNTER — OFFICE VISIT (OUTPATIENT)
Dept: FAMILY MEDICINE | Facility: CLINIC | Age: 28
End: 2023-02-17
Payer: COMMERCIAL

## 2023-02-17 VITALS
HEIGHT: 62 IN | SYSTOLIC BLOOD PRESSURE: 116 MMHG | TEMPERATURE: 98 F | BODY MASS INDEX: 33.13 KG/M2 | HEART RATE: 82 BPM | RESPIRATION RATE: 18 BRPM | WEIGHT: 180 LBS | DIASTOLIC BLOOD PRESSURE: 86 MMHG | OXYGEN SATURATION: 98 %

## 2023-02-17 DIAGNOSIS — H69.93 DYSFUNCTION OF BOTH EUSTACHIAN TUBES: Primary | ICD-10-CM

## 2023-02-17 PROCEDURE — 99214 OFFICE O/P EST MOD 30 MIN: CPT | Performed by: PHYSICIAN ASSISTANT

## 2023-02-17 RX ORDER — PREDNISONE 20 MG/1
20 TABLET ORAL DAILY
Qty: 7 TABLET | Refills: 0 | Status: SHIPPED | OUTPATIENT
Start: 2023-02-17 | End: 2023-05-18

## 2023-02-17 ASSESSMENT — ENCOUNTER SYMPTOMS
PARESTHESIAS: 0
DIARRHEA: 0
SINUS PRESSURE: 1
ARTHRALGIAS: 0
CONSTIPATION: 0
NUMBNESS: 0
DIZZINESS: 0
HEADACHES: 1
FEVER: 0

## 2023-02-17 NOTE — PROGRESS NOTES
Assessment & Plan     Dysfunction of both eustachian tubes  Patient with pressure sensation in the head, with some pressure over the frontal sinus and popping ear sensation, as well as tinnitus. Normal neurological exam today, no focal findings. Normal MRI, normal optho exams recently upon chart review. Patient is scheduled to see ENT not until May. Unclear etiology of symptoms at this point. Will rx prednisone for possible eustachian tube dysfunction or sinus inflammation, patient should still follow up with ENT. Advised should the prednisone not be helpful and with ongoing symptoms patient should schedule follow up with neurology to continue looking into potential for increased intracranial pressure with LP. Patient advised to follow up if ongoing symptoms.  Can consider checking inflammatory markers in the future.   - predniSONE (DELTASONE) 20 MG tablet  Dispense: 7 tablet; Refill: 0    No follow-ups on file.  Note composed by YRN Tamayo. History and exam performed and confirmed by me. Agree with assessment and plan.    SANDRO Ashraf  [facility]    Isaac Del Angel is a 27 year old, presenting for the following health issues:  Headache  For the past 6 months, patient has noted intermittent head pressure. She describes it feels like an air head pressure sensation in the head. She also feels a throbbing in her head in addition to ear popping. This got bad again over the weekend over the last 3 days. She states it feels like a migraine without the pain. Denies nasal congestion. She feels there is some pressure in the forehead area. She does have an appointment with ENT not until May. She took ibuprofen at home which didn't seem to make a difference. She intermittently experiences tinnitus, denies hearing loss, ear pain. No history of trauma to the head. Sometimes feels like she has foggy vision. No weakness, numbness. No joint or muscle pains. She has had migraine  "headaches before though reports that this feels different.     She has seen a neurologist and she had an MRI which was largely unremarkable. They suspected it is largely due to hypertension which she is taking blood pressure medication for as normal.     She denies diarrhea, constipation or additional concerns at this time.     History of Present Illness       Reason for visit:  Head pressure without pain (headache/migraine) ears popping, throbbing in head.    She eats 2-3 servings of fruits and vegetables daily.She consumes 1 sweetened beverage(s) daily.She exercises with enough effort to increase her heart rate 9 or less minutes per day.  She exercises with enough effort to increase her heart rate 3 or less days per week.   She is taking medications regularly.       Review of Systems   Constitutional: Negative for fever.   HENT: Positive for sinus pressure and tinnitus (intermittent). Negative for congestion, ear pain and hearing loss.    Gastrointestinal: Negative for constipation and diarrhea.   Musculoskeletal: Negative for arthralgias.   Neurological: Positive for headaches. Negative for dizziness, numbness and paresthesias.          Objective    /86   Pulse 82   Temp 98  F (36.7  C)   Resp 18   Ht 1.575 m (5' 2\")   Wt 81.6 kg (180 lb)   LMP 02/12/2023   SpO2 98%   BMI 32.92 kg/m    Body mass index is 32.92 kg/m .  Physical Exam  Constitutional:       General: She is not in acute distress.     Appearance: Normal appearance. She is not ill-appearing.   HENT:      Head: Normocephalic and atraumatic.      Right Ear: Tympanic membrane, ear canal and external ear normal.      Left Ear: Tympanic membrane, ear canal and external ear normal.      Nose:      Right Sinus: Frontal sinus tenderness present. No maxillary sinus tenderness.      Left Sinus: Frontal sinus tenderness present. No maxillary sinus tenderness.      Mouth/Throat:      Mouth: Mucous membranes are moist.      Pharynx: No oropharyngeal " exudate or posterior oropharyngeal erythema.   Eyes:      Extraocular Movements: Extraocular movements intact.      Conjunctiva/sclera: Conjunctivae normal.      Pupils: Pupils are equal, round, and reactive to light.   Cardiovascular:      Rate and Rhythm: Normal rate and regular rhythm.      Pulses: Normal pulses.      Heart sounds: Normal heart sounds. No murmur heard.    No friction rub. No gallop.   Pulmonary:      Effort: Pulmonary effort is normal. No respiratory distress.      Breath sounds: No wheezing, rhonchi or rales.   Musculoskeletal:         General: No deformity.      Cervical back: No rigidity.   Lymphadenopathy:      Cervical: No cervical adenopathy.   Skin:     General: Skin is warm.   Neurological:      General: No focal deficit present.      Mental Status: She is alert and oriented to person, place, and time.      Cranial Nerves: Cranial nerves 2-12 are intact. No facial asymmetry.      Sensory: No sensory deficit.      Motor: No weakness.      Coordination: Finger-Nose-Finger Test normal. Rapid alternating movements normal.      Deep Tendon Reflexes:      Reflex Scores:       Patellar reflexes are 2+ on the right side and 2+ on the left side.

## 2023-04-18 ENCOUNTER — MYC MEDICAL ADVICE (OUTPATIENT)
Dept: INTERNAL MEDICINE | Facility: CLINIC | Age: 28
End: 2023-04-18
Payer: COMMERCIAL

## 2023-04-19 ENCOUNTER — OFFICE VISIT (OUTPATIENT)
Dept: FAMILY MEDICINE | Facility: CLINIC | Age: 28
End: 2023-04-19
Payer: COMMERCIAL

## 2023-04-19 VITALS
BODY MASS INDEX: 33.13 KG/M2 | HEIGHT: 62 IN | WEIGHT: 180 LBS | SYSTOLIC BLOOD PRESSURE: 112 MMHG | OXYGEN SATURATION: 98 % | HEART RATE: 64 BPM | TEMPERATURE: 98.2 F | DIASTOLIC BLOOD PRESSURE: 78 MMHG | RESPIRATION RATE: 16 BRPM

## 2023-04-19 DIAGNOSIS — R30.0 DYSURIA: ICD-10-CM

## 2023-04-19 DIAGNOSIS — N39.0 ACUTE UTI (URINARY TRACT INFECTION): Primary | ICD-10-CM

## 2023-04-19 LAB
ALBUMIN UR-MCNC: NEGATIVE MG/DL
APPEARANCE UR: ABNORMAL
BACTERIA #/AREA URNS HPF: ABNORMAL /HPF
BILIRUB UR QL STRIP: NEGATIVE
COLOR UR AUTO: YELLOW
GLUCOSE UR STRIP-MCNC: NEGATIVE MG/DL
HGB UR QL STRIP: ABNORMAL
KETONES UR STRIP-MCNC: NEGATIVE MG/DL
LEUKOCYTE ESTERASE UR QL STRIP: ABNORMAL
NITRATE UR QL: NEGATIVE
PH UR STRIP: 6.5 [PH] (ref 5–7)
RBC #/AREA URNS AUTO: ABNORMAL /HPF
SP GR UR STRIP: 1.01 (ref 1–1.03)
SQUAMOUS #/AREA URNS AUTO: ABNORMAL /LPF
TRANS CELLS #/AREA URNS HPF: ABNORMAL /HPF
UROBILINOGEN UR STRIP-ACNC: 0.2 E.U./DL
WBC #/AREA URNS AUTO: ABNORMAL /HPF

## 2023-04-19 PROCEDURE — 99213 OFFICE O/P EST LOW 20 MIN: CPT | Performed by: FAMILY MEDICINE

## 2023-04-19 PROCEDURE — 81001 URINALYSIS AUTO W/SCOPE: CPT | Performed by: FAMILY MEDICINE

## 2023-04-19 RX ORDER — NITROFURANTOIN 25; 75 MG/1; MG/1
100 CAPSULE ORAL 2 TIMES DAILY
Qty: 14 CAPSULE | Refills: 0 | Status: SHIPPED | OUTPATIENT
Start: 2023-04-19 | End: 2023-04-26

## 2023-04-19 NOTE — PATIENT INSTRUCTIONS
Urinary Tract Infections in Women  Urinary tract infections (UTIs) are most often caused by bacteria. These bacteria enter the urinary tract. The bacteria may come from inside the body. Or they may travel from the skin outside the rectum or vagina into the urethra. Female anatomy makes it easy for bacteria from the bowel to enter a woman s urinary tract, which is the most common source of UTI. This means women develop UTIs more often than men. Pain in or around the urinary tract is a common UTI symptom. But the only way to know for sure if you have a UTI for the healthcare provider to test your urine. The two tests that may be done are the urinalysis and urine culture.    Types of UTIs    Cystitis. A bladder infection (cystitis) is the most common UTI in women. You may have urgent or frequent need to pee. You may also have pain, burning when you pee, and bloody urine.    Urethritis. This is an inflamed urethra, which is the tube that carries urine from the bladder to outside the body. You may have lower stomach or back pain. You may also have urgent or frequent need to pee.    Pyelonephritis. This is a kidney infection. If not treated, it can be serious and damage your kidneys. In severe cases, you may need to stay in the hospital. You may have a fever and lower back pain.    Medicines to treat a UTI  Most UTIs are treated with antibiotics. These kill the bacteria. The length of time you need to take them depends on the type of infection. It may be as short as 3 days. If you have repeated UTIs, you may need a low-dose antibiotic for several months. Take antibiotics exactly as directed. Don t stop taking them until all of the medicine is gone, even if you feel better. If you stop taking the antibiotic too soon, the infection may not go away. You may also develop a resistance to the antibiotic. This can make it much harder to treat.  Lifestyle changes to treat and prevent UTIs  The lifestyle changes below will  help get rid of your UTI. They may also help prevent future UTIs.    Drink plenty of fluids. This includes water, juice, or other caffeine-free drinks. Fluids help flush bacteria out of your body.    Empty your bladder. Always empty your bladder when you feel the urge to pee. And always pee before going to sleep. Urine that stays in your bladder can lead to infection. Try to pee before and after sex as well.    Practice good personal hygiene. Wipe yourself from front to back after using the toilet. This helps keep bacteria from getting into the urethra.    Wear cotton underwear. Don't wear synthetic or tight-fitting underwear that can trap moisture. Change out of wet bathing suits and workout clothing quickly.    Take showers. Showers are better than baths for preventing UTIs.    Use condoms during sex. These help prevent UTIs caused by sexually transmitted bacteria. Also don't use spermicides during sex. These can increase the risk for UTIs. Choose other forms of birth control instead. For women who tend to get UTIs after sex, a low-dose of a preventive antibiotic may be used. Be sure to discuss this option with your healthcare provider.    Follow up with your healthcare provider as directed. They may test to make sure the infection has cleared. If needed, more treatment may be started.  Keri last reviewed this educational content on 9/1/2021 2000-2022 [employer]. All rights reserved. This information is not intended as a substitute for professional medical care. Always follow your healthcare professional's instructions.

## 2023-04-19 NOTE — PROGRESS NOTES
Clinical Decision Making:    At the end of the encounter, I discussed results, diagnosis, medications. Discussed red flags for immediate return to clinic/ER, as well as indications for follow up if no improvement. Patient understood and agreed to plan. Patient was stable for discharge.      ICD-10-CM    1. Acute UTI (urinary tract infection)  N39.0 nitroFURantoin macrocrystal-monohydrate (MACROBID) 100 MG capsule      2. Dysuria  R30.0 UA Macro with Reflex to Micro and Culture - lab collect     UA Macro with Reflex to Micro and Culture - lab collect     UA Microscopic with Reflex to Culture        Treating with Macrobid twice daily for 7 days  Urine culture pending  Patient is allergic to amoxicillin, cephalexin as well as sulfa.  Increase fluid intake  Printed patient education on urinary tract infections  Follow-up if not improving as anticipated      Patient Instructions       Urinary Tract Infections in Women  Urinary tract infections (UTIs) are most often caused by bacteria. These bacteria enter the urinary tract. The bacteria may come from inside the body. Or they may travel from the skin outside the rectum or vagina into the urethra. Female anatomy makes it easy for bacteria from the bowel to enter a woman s urinary tract, which is the most common source of UTI. This means women develop UTIs more often than men. Pain in or around the urinary tract is a common UTI symptom. But the only way to know for sure if you have a UTI for the healthcare provider to test your urine. The two tests that may be done are the urinalysis and urine culture.    Types of UTIs    Cystitis. A bladder infection (cystitis) is the most common UTI in women. You may have urgent or frequent need to pee. You may also have pain, burning when you pee, and bloody urine.    Urethritis. This is an inflamed urethra, which is the tube that carries urine from the bladder to outside the body. You may have lower stomach or back pain. You may also  have urgent or frequent need to pee.    Pyelonephritis. This is a kidney infection. If not treated, it can be serious and damage your kidneys. In severe cases, you may need to stay in the hospital. You may have a fever and lower back pain.    Medicines to treat a UTI  Most UTIs are treated with antibiotics. These kill the bacteria. The length of time you need to take them depends on the type of infection. It may be as short as 3 days. If you have repeated UTIs, you may need a low-dose antibiotic for several months. Take antibiotics exactly as directed. Don t stop taking them until all of the medicine is gone, even if you feel better. If you stop taking the antibiotic too soon, the infection may not go away. You may also develop a resistance to the antibiotic. This can make it much harder to treat.  Lifestyle changes to treat and prevent UTIs  The lifestyle changes below will help get rid of your UTI. They may also help prevent future UTIs.    Drink plenty of fluids. This includes water, juice, or other caffeine-free drinks. Fluids help flush bacteria out of your body.    Empty your bladder. Always empty your bladder when you feel the urge to pee. And always pee before going to sleep. Urine that stays in your bladder can lead to infection. Try to pee before and after sex as well.    Practice good personal hygiene. Wipe yourself from front to back after using the toilet. This helps keep bacteria from getting into the urethra.    Wear cotton underwear. Don't wear synthetic or tight-fitting underwear that can trap moisture. Change out of wet bathing suits and workout clothing quickly.    Take showers. Showers are better than baths for preventing UTIs.    Use condoms during sex. These help prevent UTIs caused by sexually transmitted bacteria. Also don't use spermicides during sex. These can increase the risk for UTIs. Choose other forms of birth control instead. For women who tend to get UTIs after sex, a low-dose of a  "preventive antibiotic may be used. Be sure to discuss this option with your healthcare provider.    Follow up with your healthcare provider as directed. They may test to make sure the infection has cleared. If needed, more treatment may be started.  Keri last reviewed this educational content on 9/1/2021 2000-2022 [employer]. All rights reserved. This information is not intended as a substitute for professional medical care. Always follow your healthcare professional's instructions.           Return in about 1 week (around 4/26/2023), or if symptoms worsen or fail to improve.      chief complaint    HPI:  Mer Gonzalez is a 28 year old female who presents today complaining of dysuria as well as urinary urgency for 2 days.  Positive history of urinary tract infections.  In the morning the urine smelled strongly.  No urinary frequency.  She denies fevers, chills, nausea.  No back pain or abdominal pain.  No vaginal itching, discharge, odor.  No concern about sexually transmitted infections.  She has increased her water intake today    History obtained from chart review and the patient.    Problem List:  2019-08: Migraine without aura and without status migrainosus, not   intractable  2014-11: Scoliosis      Past Medical History:   Diagnosis Date     Hypertension        Social History     Tobacco Use     Smoking status: Never     Smokeless tobacco: Never   Vaping Use     Vaping status: Never Used   Substance Use Topics     Alcohol use: Not Currently       Review of systems  negative except listed in HPI    Vitals:    04/19/23 1203   BP: 112/78   BP Location: Right arm   Patient Position: Sitting   Cuff Size: Adult Regular   Pulse: 64   Resp: 16   Temp: 98.2  F (36.8  C)   TempSrc: Tympanic   SpO2: 98%   Weight: 81.6 kg (180 lb)   Height: 1.575 m (5' 2\")       Physical Exam  Vitals noted and within normal limits.  In general patient is alert, oriented and in no acute distress.  Back with no CVA " tenderness.  Abdomen soft, non-tender and not distended.  UA macro positive for blood and LE  Micro with 5-10 white cells per high-power field  Results for orders placed or performed in visit on 04/19/23   UA Macro with Reflex to Micro and Culture - lab collect     Status: Abnormal    Specimen: Urine, Clean Catch   Result Value Ref Range    Color Urine Yellow Colorless, Straw, Light Yellow, Yellow    Appearance Urine Slightly Cloudy (A) Clear    Glucose Urine Negative Negative mg/dL    Bilirubin Urine Negative Negative    Ketones Urine Negative Negative mg/dL    Specific Gravity Urine 1.010 1.003 - 1.035    Blood Urine Trace (A) Negative    pH Urine 6.5 5.0 - 7.0    Protein Albumin Urine Negative Negative mg/dL    Urobilinogen Urine 0.2 0.2, 1.0 E.U./dL    Nitrite Urine Negative Negative    Leukocyte Esterase Urine Small (A) Negative   UA Microscopic with Reflex to Culture     Status: Abnormal   Result Value Ref Range    Bacteria Urine Moderate (A) None Seen /HPF    RBC Urine 0-2 0-2 /HPF /HPF    WBC Urine 5-10 (A) 0-5 /HPF /HPF    Squamous Epithelials Urine Moderate (A) None Seen /LPF    Transitional Epithelials Urine Few (A) None Seen /HPF    Narrative    Urine Culture not indicated           Answers for HPI/ROS submitted by the patient on 4/19/2023  How many servings of fruits and vegetables do you eat daily?: 2-3  On average, how many sweetened beverages do you drink each day (Examples: soda, juice, sweet tea, etc.  Do NOT count diet or artificially sweetened beverages)?: 0  How many minutes a day do you exercise enough to make your heart beat faster?: 9 or less  How many days a week do you exercise enough to make your heart beat faster?: 3 or less  How many days per week do you miss taking your medication?: 0  What is the reason for your visit today?: uti/bv symptoms  When did your symptoms begin?: 1-3 days ago  What are your symptoms?: foul smell, pain,burning,irratation  How would you describe these  symptoms?: Mild  Are your symptoms:: Worsening  Have you had these symptoms before?: Yes  Have you tried or received treatment for these symptoms before?: Yes  Did that treatment work? : Yes  Please describe the treatment you had:: medication

## 2023-04-20 ENCOUNTER — PATIENT OUTREACH (OUTPATIENT)
Dept: CARE COORDINATION | Facility: CLINIC | Age: 28
End: 2023-04-20
Payer: COMMERCIAL

## 2023-04-28 ENCOUNTER — MYC MEDICAL ADVICE (OUTPATIENT)
Dept: FAMILY MEDICINE | Facility: CLINIC | Age: 28
End: 2023-04-28
Payer: COMMERCIAL

## 2023-05-13 ENCOUNTER — HEALTH MAINTENANCE LETTER (OUTPATIENT)
Age: 28
End: 2023-05-13

## 2023-05-18 ENCOUNTER — OFFICE VISIT (OUTPATIENT)
Dept: FAMILY MEDICINE | Facility: CLINIC | Age: 28
End: 2023-05-18
Payer: COMMERCIAL

## 2023-05-18 VITALS
SYSTOLIC BLOOD PRESSURE: 110 MMHG | OXYGEN SATURATION: 99 % | BODY MASS INDEX: 32.24 KG/M2 | DIASTOLIC BLOOD PRESSURE: 80 MMHG | WEIGHT: 175.2 LBS | TEMPERATURE: 98.1 F | RESPIRATION RATE: 16 BRPM | HEIGHT: 62 IN | HEART RATE: 85 BPM

## 2023-05-18 DIAGNOSIS — I10 ESSENTIAL HYPERTENSION: ICD-10-CM

## 2023-05-18 DIAGNOSIS — R30.0 DYSURIA: ICD-10-CM

## 2023-05-18 DIAGNOSIS — Z00.00 ROUTINE GENERAL MEDICAL EXAMINATION AT A HEALTH CARE FACILITY: Primary | ICD-10-CM

## 2023-05-18 DIAGNOSIS — Z11.3 ROUTINE SCREENING FOR STI (SEXUALLY TRANSMITTED INFECTION): ICD-10-CM

## 2023-05-18 DIAGNOSIS — Z13.220 LIPID SCREENING: ICD-10-CM

## 2023-05-18 LAB
ALBUMIN UR-MCNC: NEGATIVE MG/DL
ANION GAP SERPL CALCULATED.3IONS-SCNC: 11 MMOL/L (ref 7–15)
APPEARANCE UR: CLEAR
BILIRUB UR QL STRIP: NEGATIVE
BUN SERPL-MCNC: 6.3 MG/DL (ref 6–20)
C TRACH DNA SPEC QL NAA+PROBE: NEGATIVE
C TRACH DNA SPEC QL PROBE+SIG AMP: NEGATIVE
CALCIUM SERPL-MCNC: 9.2 MG/DL (ref 8.6–10)
CHLORIDE SERPL-SCNC: 105 MMOL/L (ref 98–107)
CHOLEST SERPL-MCNC: 210 MG/DL
CLUE CELLS: ABNORMAL
COLOR UR AUTO: YELLOW
CREAT SERPL-MCNC: 0.74 MG/DL (ref 0.51–0.95)
DEPRECATED HCO3 PLAS-SCNC: 24 MMOL/L (ref 22–29)
GFR SERPL CREATININE-BSD FRML MDRD: >90 ML/MIN/1.73M2
GLUCOSE SERPL-MCNC: 99 MG/DL (ref 70–99)
GLUCOSE UR STRIP-MCNC: NEGATIVE MG/DL
HDLC SERPL-MCNC: 58 MG/DL
HGB UR QL STRIP: NEGATIVE
KETONES UR STRIP-MCNC: NEGATIVE MG/DL
LDLC SERPL CALC-MCNC: 139 MG/DL
LEUKOCYTE ESTERASE UR QL STRIP: NEGATIVE
N GONORRHOEA DNA SPEC QL NAA+PROBE: NEGATIVE
N GONORRHOEA DNA SPEC QL NAA+PROBE: NEGATIVE
NITRATE UR QL: NEGATIVE
NONHDLC SERPL-MCNC: 152 MG/DL
PH UR STRIP: 7 [PH] (ref 5–7)
POTASSIUM SERPL-SCNC: 3.7 MMOL/L (ref 3.4–5.3)
SODIUM SERPL-SCNC: 140 MMOL/L (ref 136–145)
SP GR UR STRIP: 1.02 (ref 1–1.03)
TRICHOMONAS, WET PREP: ABNORMAL
TRIGL SERPL-MCNC: 67 MG/DL
UROBILINOGEN UR STRIP-ACNC: 0.2 E.U./DL
WBC'S/HIGH POWER FIELD, WET PREP: ABNORMAL
YEAST, WET PREP: ABNORMAL

## 2023-05-18 PROCEDURE — 80048 BASIC METABOLIC PNL TOTAL CA: CPT | Performed by: NURSE PRACTITIONER

## 2023-05-18 PROCEDURE — 99395 PREV VISIT EST AGE 18-39: CPT | Performed by: NURSE PRACTITIONER

## 2023-05-18 PROCEDURE — 81003 URINALYSIS AUTO W/O SCOPE: CPT | Mod: QW | Performed by: NURSE PRACTITIONER

## 2023-05-18 PROCEDURE — 87086 URINE CULTURE/COLONY COUNT: CPT | Performed by: NURSE PRACTITIONER

## 2023-05-18 PROCEDURE — 87591 N.GONORRHOEAE DNA AMP PROB: CPT | Performed by: NURSE PRACTITIONER

## 2023-05-18 PROCEDURE — 99213 OFFICE O/P EST LOW 20 MIN: CPT | Mod: 25 | Performed by: NURSE PRACTITIONER

## 2023-05-18 PROCEDURE — 87210 SMEAR WET MOUNT SALINE/INK: CPT | Mod: QW | Performed by: NURSE PRACTITIONER

## 2023-05-18 PROCEDURE — 36415 COLL VENOUS BLD VENIPUNCTURE: CPT | Performed by: NURSE PRACTITIONER

## 2023-05-18 PROCEDURE — 87491 CHLMYD TRACH DNA AMP PROBE: CPT | Performed by: NURSE PRACTITIONER

## 2023-05-18 PROCEDURE — 80061 LIPID PANEL: CPT | Performed by: NURSE PRACTITIONER

## 2023-05-18 RX ORDER — AMLODIPINE BESYLATE 5 MG/1
5 TABLET ORAL DAILY
Qty: 90 TABLET | Refills: 3 | Status: SHIPPED | OUTPATIENT
Start: 2023-05-18 | End: 2023-07-18

## 2023-05-18 ASSESSMENT — ENCOUNTER SYMPTOMS
FREQUENCY: 1
DIZZINESS: 1
ABDOMINAL PAIN: 1
BREAST MASS: 0
DYSURIA: 1

## 2023-05-18 NOTE — PROGRESS NOTES
SUBJECTIVE:   CC: Mer is an 28 year old who presents for preventive health visit.      uti treatment 4 weeks ago, symptoms of pelvic pressure, some vag discharge, dysuria and frequency still present  paps normal, done last year  HPV vaccine UTD  Same partner 1 year, choosing condoms 100% with withdrawal, has used Plan B in past.       Works from home for [employer] [place]    Borderline lipids a year ago, also HTN treatment, started amlodipine. Has lost some wt and reduced salt, would like to recheck labs, she is fasting.     1/6/2023     3:11 PM   Additional Questions   Roomed by Alycia VILLATORO   Patient has been advised of split billing requirements and indicates understanding: Yes  Healthy Habits:    Getting at least 3 servings of Calcium per day:  Yes    Bi-annual eye exam:  Yes    Dental care twice a year:  Yes    Sleep apnea or symptoms of sleep apnea:  None    Diet:  Regular (no restrictions) and Low salt    Frequency of exercise:  1 day/week    Duration of exercise:  Less than 15 minutes    Taking medications regularly:  Yes    Medication side effects:  None    PHQ-2 Total Score:    Additional concerns today:  No      LMP--monthly, 5/6                  Social History     Tobacco Use     Smoking status: Never     Smokeless tobacco: Never   Vaping Use     Vaping status: Never Used   Substance Use Topics     Alcohol use: Not Currently             5/18/2023     6:50 AM   Alcohol Use   Prescreen: >3 drinks/day or >7 drinks/week? No     Reviewed orders with patient.  Reviewed health maintenance and updated orders accordingly - Yes  Lab work is in process    Breast Cancer Screening:        3/21/2022     7:31 AM   Breast CA Risk Assessment (FHS-7)   Do you have a family history of breast, colon, or ovarian cancer? No / Unknown         Patient under 40 years of age: Routine Mammogram Screening not recommended.   Pertinent mammograms are reviewed under the imaging tab.    History of abnormal Pap smear: NO - age 21-29 PAP every  "3 years recommended      3/25/2022    11:53 AM 8/23/2019     2:24 PM   PAP / HPV   PAP Negative for Intraepithelial Lesion or Malignancy (NILM)   Negative for squamous intraepithelial lesion or malignancy  Electronically signed by David Valle CT (ASCP) on 8/26/2019 at  3:52 PM         Reviewed and updated as needed this visit by clinical staff   Tobacco  Allergies  Meds              Reviewed and updated as needed this visit by Provider                     Review of Systems   Gastrointestinal: Positive for abdominal pain.   Breasts:  Positive for tenderness. Negative for breast mass and discharge.   Genitourinary: Positive for dysuria, frequency, vaginal bleeding and vaginal discharge. Negative for pelvic pain.   Neurological: Positive for dizziness.   Psychiatric/Behavioral: Positive for mood changes.          OBJECTIVE:   /80 (BP Location: Right arm, Patient Position: Sitting)   Pulse 85   Temp 98.1  F (36.7  C) (Tympanic)   Resp 16   Ht 1.562 m (5' 1.5\")   Wt 79.5 kg (175 lb 3.2 oz)   LMP 05/06/2023 (Exact Date)   SpO2 99%   BMI 32.57 kg/m    Physical Exam  GENERAL: healthy, alert and no distress  EYES: Eyes grossly normal to inspection, PERRL and conjunctivae and sclerae normal  HENT: ear canals and TM's normal, nose and mouth without ulcers or lesions  NECK: no adenopathy, no asymmetry, masses, or scars and thyroid normal to palpation  RESP: lungs clear to auscultation - no rales, rhonchi or wheezes  CV: regular rate and rhythm, normal S1 S2, no S3 or S4, no murmur, click or rub, no peripheral edema and peripheral pulses strong  ABDOMEN: soft, nontender, no hepatosplenomegaly, no masses and bowel sounds normal   (female): normal female external genitalia, normal urethral meatus, vaginal mucosa, normal cervix/adnexa/uterus without masses or discharge  MS: no gross musculoskeletal defects noted, no edema  NEURO: Normal strength and tone, mentation intact and speech normal  BACK: no " CVA tenderness, no paralumbar tenderness  LYMPH: no cervical, supraclavicular, axillary, or inguinal adenopathy        ASSESSMENT/PLAN:       ICD-10-CM    1. Routine general medical examination at a health care facility  Z00.00 REVIEW OF HEALTH MAINTENANCE PROTOCOL ORDERS      2. Routine screening for STI (sexually transmitted infection)  Z11.3 Wet prep - Clinic Collect     Chlamydia trachomatis PCR     Neisseria gonorrhoeae PCR      3. Dysuria  R30.0 UA reflex to Microscopic - lab collect     Urine Culture Aerobic Bacterial - lab collect      4. Lipid screening  Z13.220 Lipid panel reflex to direct LDL Fasting      5. Essential hypertension  I10 Basic metabolic panel          Patient has been advised of split billing requirements and indicates understanding: Yes      COUNSELING:  Reviewed preventive health counseling, as reflected in patient instructions       Regular exercise       Healthy diet/nutrition       Vision screening       Contraception       Family planning       Safe sex practices/STD prevention       Colorectal Cancer Screening        She reports that she has never smoked. She has never used smokeless tobacco.      Adelia Grier NP  [facility]

## 2023-05-19 ENCOUNTER — OFFICE VISIT (OUTPATIENT)
Dept: FAMILY MEDICINE | Facility: CLINIC | Age: 28
End: 2023-05-19
Payer: COMMERCIAL

## 2023-05-19 ENCOUNTER — NURSE TRIAGE (OUTPATIENT)
Dept: FAMILY MEDICINE | Facility: CLINIC | Age: 28
End: 2023-05-19

## 2023-05-19 VITALS
OXYGEN SATURATION: 98 % | DIASTOLIC BLOOD PRESSURE: 88 MMHG | WEIGHT: 176.3 LBS | HEIGHT: 62 IN | HEART RATE: 93 BPM | TEMPERATURE: 99 F | SYSTOLIC BLOOD PRESSURE: 126 MMHG | BODY MASS INDEX: 32.44 KG/M2

## 2023-05-19 DIAGNOSIS — N92.6 IRREGULAR BLEEDING: Primary | ICD-10-CM

## 2023-05-19 DIAGNOSIS — N93.9 VAGINAL SPOTTING: ICD-10-CM

## 2023-05-19 LAB
BACTERIA UR CULT: NO GROWTH
HCG UR QL: NEGATIVE

## 2023-05-19 PROCEDURE — 81025 URINE PREGNANCY TEST: CPT | Performed by: NURSE PRACTITIONER

## 2023-05-19 PROCEDURE — 99213 OFFICE O/P EST LOW 20 MIN: CPT | Performed by: NURSE PRACTITIONER

## 2023-05-19 NOTE — TELEPHONE ENCOUNTER
Call with pt and given recommendations. Pt states the bleeding is light to medium flow. Assisted to schedule OV.

## 2023-05-19 NOTE — TELEPHONE ENCOUNTER
If she is having heavy vaginal bleeding she should use the emergency room.  She has had a history of a D&C because of heavy bleeding.  If the bleeding subsided and she feels well I am happy to see her this afternoon and help assess and make arrangements for next steps

## 2023-05-19 NOTE — PROGRESS NOTES
"Exam  Assessment & Plan     (N92.6) Irregular bleeding  (primary encounter diagnosis)  Comment:   Plan: HCG qualitative urine        We will await UPT if positive this would likely explain some spotting.  If negative she was offered a pelvic ultrasound but her bleeding is very light and she had a normal pelvic ultrasound in March at the [place] [facility] so she is comfortable monitoring this    (N93.9) Vaginal spotting  Comment:   Plan:                  Adelia Grier NP  [facility]    Isaac Del Angel is a 28 year old, presenting for the following health issues: vaginal bleeding x2 days, bright red, says this is not her period, LMP 5/9/23, no cramping, no pain, had D&C 11/2022 because of bleeding in between periods    LMP 5/9, they come monthly.  I saw her yesterday with a pelvicexam   that was  Unremarkable.  She had STI testing was negative.    This morning and she had some spotting.  No cramping no bleeding maybe was a little dizzy but that is all resolved now.  She is well-hydrated    She tells me that in March she had a follow-up pelvic ultrasound after having a D&C in November for her fibroids and bleeding between periods.  She states that pelvic ultrasound in March was normal  Vaginal Bleeding        5/19/2023     3:08 PM   Additional Questions   Roomed by yissel carter   Accompanied by self     History of Present Illness       Reason for visit:  Vaginal bleeding    She eats 2-3 servings of fruits and vegetables daily.She consumes 1 sweetened beverage(s) daily.She exercises with enough effort to increase her heart rate 9 or less minutes per day.  She exercises with enough effort to increase her heart rate 3 or less days per week.   She is taking medications regularly.           Review of Systems         Objective    /88 (BP Location: Right arm, Patient Position: Sitting)   Pulse 93   Temp 99  F (37.2  C)   Ht 1.562 m (5' 1.5\")   Wt 80 kg (176 lb 4.8 oz)   LMP " 05/09/2023 (Approximate)   SpO2 98%   BMI 32.77 kg/m    Body mass index is 32.77 kg/m .  Physical Exam   She alert and oriented no acute distress  Heart regular rate rhythm with a repeat pulse of 66 bpm  Skin warm pink dry

## 2023-05-19 NOTE — TELEPHONE ENCOUNTER
"    Additional Information    Negative: SEVERE vaginal bleeding (e.g., continuous red blood from vagina, or large blood clots) and very weak (can't stand)    Negative: Passed out (i.e., fainted, collapsed and was not responding)    Negative: Difficult to awaken or acting confused (e.g., disoriented, slurred speech)    Negative: Shock suspected (e.g., cold/pale/clammy skin, too weak to stand, low BP, rapid pulse)    Negative: Sounds like a life-threatening emergency to the triager    Negative: Pregnant 20 or more weeks (5 months or more)    Negative: Pregnant < 20 weeks (less than 5 months)    Negative: Postpartum (from 0 to 6 weeks after delivery)    Negative: Vaginal discharge is main symptom and bleeding is slight    Negative: SEVERE abdominal pain (e.g., excruciating)    Negative: SEVERE dizziness (e.g., unable to stand, requires support to walk, feels like passing out now)    Answer Assessment - Initial Assessment Questions  1. AMOUNT: \"Describe the bleeding that you are having.\"     - SPOTTING: spotting, or pinkish / brownish mucous discharge; does not fill panty liner or pad     - MILD:  less than 1 pad / hour; less than patient's usual menstrual bleeding    - MODERATE: 1-2 pads / hour; 1 menstrual cup every 6 hours; small-medium blood clots (e.g., pea, grape, small coin)    - SEVERE: soaking 2 or more pads/hour for 2 or more hours; 1 menstrual cup every 2 hours; bleeding not contained by pads or continuous red blood from vagina; large blood clots (e.g., golf ball, large coin)       Using pad; few drops in toilet  2. ONSET: \"When did the bleeding begin?\" \"Is it continuing now?\"      2 days  3. MENSTRUAL PERIOD: \"When was the last normal menstrual period?\" \"How is this different than your period?\"      Period is not due until aroun 6/4/23  4. REGULARITY: \"How regular are your periods?\"      yes  5. ABDOMINAL PAIN: \"Do you have any pain?\" \"How bad is the pain?\"  (e.g., Scale 1-10; mild, moderate, or severe)    - " "MILD (1-3): doesn't interfere with normal activities, abdomen soft and not tender to touch     - MODERATE (4-7): interferes with normal activities or awakens from sleep, abdomen tender to touch     - SEVERE (8-10): excruciating pain, doubled over, unable to do any normal activities       None during call  6. PREGNANCY: \"Could you be pregnant?\" \"Are you sexually active?\" \"Did you recently give birth?\"      No chance of prednancy  7. BREASTFEEDING: \"Are you breastfeeding?\"      no  8. HORMONES: \"Are you taking any hormone medications, prescription or OTC?\" (e.g., birth control pills, estrogen)      no  9. BLOOD THINNERS: \"Do you take any blood thinners?\" (e.g., Coumadin/warfarin, Pradaxa/dabigatran, aspirin)      no  10. CAUSE: \"What do you think is causing the bleeding?\" (e.g., recent gyn surgery, recent gyn procedure; known bleeding disorder, cervical cancer, polycystic ovarian disease, fibroids)          Unsure what is causing. Pt has history of abnormal bleeding last summer; and ultrasound was done and fibroids were removed in November.   11. HEMODYNAMIC STATUS: \"Are you weak or feeling lightheaded?\" If Yes, ask: \"Can you stand and walk normally?\"         Pt was feeling lightheaded upon waking this morning. Fluids were taken, has been sitting for awhile and is no longer feeling lightheaded.   12. OTHER SYMPTOMS: \"What other symptoms are you having with the bleeding?\" (e.g., passed tissue, vaginal discharge, fever, menstrual-type cramps)        Bright red blood that appears stringy. Small amount of bleeding on pad. When urinating, there are drops of blood in the toilet.     Pt felt lightheaded upon rising this morning. Pt has drank fluids but has not eaten yet. Pt has been sitting for a couple of hours this morning and is no longer feeling lightheaded.    Protocols used: VAGINAL BLEEDING - [id number]      "

## 2023-06-29 ENCOUNTER — MYC MEDICAL ADVICE (OUTPATIENT)
Dept: FAMILY MEDICINE | Facility: CLINIC | Age: 28
End: 2023-06-29
Payer: COMMERCIAL

## 2023-06-29 DIAGNOSIS — R45.86 MOOD CHANGES: ICD-10-CM

## 2023-06-29 DIAGNOSIS — F32.81 PMDD (PREMENSTRUAL DYSPHORIC DISORDER): Primary | ICD-10-CM

## 2023-06-29 ASSESSMENT — ANXIETY QUESTIONNAIRES
6. BECOMING EASILY ANNOYED OR IRRITABLE: SEVERAL DAYS
7. FEELING AFRAID AS IF SOMETHING AWFUL MIGHT HAPPEN: SEVERAL DAYS
GAD7 TOTAL SCORE: 7
2. NOT BEING ABLE TO STOP OR CONTROL WORRYING: SEVERAL DAYS
GAD7 TOTAL SCORE: 7
5. BEING SO RESTLESS THAT IT IS HARD TO SIT STILL: SEVERAL DAYS
4. TROUBLE RELAXING: SEVERAL DAYS
1. FEELING NERVOUS, ANXIOUS, OR ON EDGE: SEVERAL DAYS
3. WORRYING TOO MUCH ABOUT DIFFERENT THINGS: SEVERAL DAYS
IF YOU CHECKED OFF ANY PROBLEMS ON THIS QUESTIONNAIRE, HOW DIFFICULT HAVE THESE PROBLEMS MADE IT FOR YOU TO DO YOUR WORK, TAKE CARE OF THINGS AT HOME, OR GET ALONG WITH OTHER PEOPLE: NOT DIFFICULT AT ALL

## 2023-07-03 ENCOUNTER — OFFICE VISIT (OUTPATIENT)
Dept: FAMILY MEDICINE | Facility: CLINIC | Age: 28
End: 2023-07-03
Payer: COMMERCIAL

## 2023-07-03 VITALS
BODY MASS INDEX: 32.28 KG/M2 | HEART RATE: 74 BPM | WEIGHT: 171 LBS | HEIGHT: 61 IN | DIASTOLIC BLOOD PRESSURE: 76 MMHG | SYSTOLIC BLOOD PRESSURE: 116 MMHG | TEMPERATURE: 98.5 F | RESPIRATION RATE: 18 BRPM | OXYGEN SATURATION: 98 %

## 2023-07-03 DIAGNOSIS — I10 ESSENTIAL HYPERTENSION: ICD-10-CM

## 2023-07-03 DIAGNOSIS — F32.81 PMDD (PREMENSTRUAL DYSPHORIC DISORDER): ICD-10-CM

## 2023-07-03 DIAGNOSIS — Z30.09 FAMILY PLANNING: Primary | ICD-10-CM

## 2023-07-03 PROCEDURE — 99213 OFFICE O/P EST LOW 20 MIN: CPT | Performed by: NURSE PRACTITIONER

## 2023-07-03 RX ORDER — AMLODIPINE BESYLATE 5 MG/1
5 TABLET ORAL DAILY
Qty: 90 TABLET | Refills: 3 | Status: CANCELLED | OUTPATIENT
Start: 2023-07-03

## 2023-07-03 RX ORDER — ETONOGESTREL AND ETHINYL ESTRADIOL VAGINAL RING .015; .12 MG/D; MG/D
1 RING VAGINAL
Qty: 3 EACH | Refills: 4 | Status: SHIPPED | OUTPATIENT
Start: 2023-07-03 | End: 2023-09-11

## 2023-07-03 ASSESSMENT — ANXIETY QUESTIONNAIRES: GAD7 TOTAL SCORE: 7

## 2023-07-03 ASSESSMENT — ENCOUNTER SYMPTOMS: NERVOUS/ANXIOUS: 1

## 2023-07-03 NOTE — PROGRESS NOTES
"  Assessment & Plan     (Z30.09) Family planning  (primary encounter diagnosis)  Comment: Counseled on various options to help with hormonal fluctuations causing premenstrual dysphoric disorder, these include low-dose hormones as well as scheduled monthly SSRI.  She is choosing the NuvaRing today and will monitor her blood pressure given her underlying hypertension this will need to be watched  Plan: etonogestrel-ethinyl estradiol (NUVARING)         0.12-0.015 MG/24HR vaginal ring            (I10) Essential hypertension  Comment:   Plan:     (F32.81) PMDD (premenstrual dysphoric disorder)  Comment:   Plan:              BMI:   Estimated body mass index is 32.31 kg/m  as calculated from the following:    Height as of this encounter: 1.549 m (5' 1\").    Weight as of this encounter: 77.6 kg (171 lb).           Adelia Grier NP  [facility]    Isaac Del Angel is a 28 year old, presenting for the following health issues:    NOTICING she is experiencing super emotional feelings about 10 days before her period.  She was on BCP ten years ago so didn't use oc's as they made her more bliss but she would like to consider hormonal options rather than starting something for mental health  LMP onset 5 days ago  She and her partner are using condoms 100% of the time    Anxiety and Depression        7/3/2023     3:56 PM   Additional Questions   Roomed by CLJ LPN   Accompanied by -     Anxiety    History of Present Illness       Mental Health Follow-up:  Patient presents to follow-up on Depression & Anxiety.Patient's depression since last visit has been:  Medium  The patient is having other symptoms associated with depression.  Patient's anxiety since last visit has been:  Good  The patient is having other symptoms associated with anxiety.  Any significant life events: No  Patient is not feeling anxious or having panic attacks.  Patient has no concerns about alcohol or drug use.    Reason for visit:  " "Extreme pms symptoms. Mood swings  Symptom onset:  More than a month  Symptoms include:  Sadness, depression, anger, extreme moods and emotions  Symptom intensity:  Moderate  Symptom progression:  Staying the same  Had these symptoms before:  Yes  Has tried/received treatment for these symptoms:  No  What makes it worse:  Nothing found  What makes it better:  Nothing found    She eats 2-3 servings of fruits and vegetables daily.She consumes 0 sweetened beverage(s) daily.She exercises with enough effort to increase her heart rate 9 or less minutes per day.  She exercises with enough effort to increase her heart rate 3 or less days per week.   She is taking medications regularly.  Today's RADHA-7 Score: 7               Review of Systems   Psychiatric/Behavioral: The patient is nervous/anxious.             Objective    /76   Pulse 74   Temp 98.5  F (36.9  C)   Resp 18   Ht 1.549 m (5' 1\")   Wt 77.6 kg (171 lb)   LMP 06/28/2023 (Approximate)   SpO2 98%   BMI 32.31 kg/m    Body mass index is 32.31 kg/m .  Physical Exam   Alert and oriented no acute distress                      "

## 2023-07-10 NOTE — TELEPHONE ENCOUNTER
Good afternoon,    Your provider is out of clinic this week.  Your message has been forwarded for her to review upon return.      With your symptoms/concerns an appointment (with one of your providers partners) is advised.   You may schedule an appointment using [employer] OR by calling [phone]  (chose option #2 Speak with Care Team to be transferred to the onsite team).    [place], [place]  [phone]

## 2023-07-17 DIAGNOSIS — I10 ESSENTIAL HYPERTENSION: ICD-10-CM

## 2023-07-18 RX ORDER — AMLODIPINE BESYLATE 5 MG/1
5 TABLET ORAL DAILY
Qty: 90 TABLET | Refills: 2 | Status: SHIPPED | OUTPATIENT
Start: 2023-07-18 | End: 2024-01-23

## 2023-07-18 NOTE — TELEPHONE ENCOUNTER
New pharmacy requested. Refills adjusted and sent to [facility] in [place], [place]    Helen Cronin RN  07/18/23 1:14 PM  [facility] Nurse Advisor

## 2023-07-28 ENCOUNTER — OFFICE VISIT (OUTPATIENT)
Dept: FAMILY MEDICINE | Facility: CLINIC | Age: 28
End: 2023-07-28
Payer: COMMERCIAL

## 2023-07-28 VITALS
RESPIRATION RATE: 20 BRPM | SYSTOLIC BLOOD PRESSURE: 116 MMHG | WEIGHT: 175 LBS | BODY MASS INDEX: 33.07 KG/M2 | DIASTOLIC BLOOD PRESSURE: 81 MMHG | OXYGEN SATURATION: 98 % | HEART RATE: 75 BPM | TEMPERATURE: 98.2 F

## 2023-07-28 DIAGNOSIS — R00.2 PALPITATIONS: Primary | ICD-10-CM

## 2023-07-28 LAB
ANION GAP SERPL CALCULATED.3IONS-SCNC: 11 MMOL/L (ref 7–15)
ATRIAL RATE - MUSE: 69 BPM
BUN SERPL-MCNC: 6.7 MG/DL (ref 6–20)
CALCIUM SERPL-MCNC: 9 MG/DL (ref 8.6–10)
CHLORIDE SERPL-SCNC: 107 MMOL/L (ref 98–107)
CREAT SERPL-MCNC: 0.75 MG/DL (ref 0.51–0.95)
DEPRECATED HCO3 PLAS-SCNC: 22 MMOL/L (ref 22–29)
DIASTOLIC BLOOD PRESSURE - MUSE: NORMAL MMHG
ERYTHROCYTE [DISTWIDTH] IN BLOOD BY AUTOMATED COUNT: 14.3 % (ref 10–15)
GFR SERPL CREATININE-BSD FRML MDRD: >90 ML/MIN/1.73M2
GLUCOSE SERPL-MCNC: 95 MG/DL (ref 70–99)
HCT VFR BLD AUTO: 37.6 % (ref 35–47)
HGB BLD-MCNC: 12.6 G/DL (ref 11.7–15.7)
INTERPRETATION ECG - MUSE: NORMAL
MCH RBC QN AUTO: 27 PG (ref 26.5–33)
MCHC RBC AUTO-ENTMCNC: 33.5 G/DL (ref 31.5–36.5)
MCV RBC AUTO: 81 FL (ref 78–100)
P AXIS - MUSE: 1 DEGREES
PLATELET # BLD AUTO: 245 10E3/UL (ref 150–450)
POTASSIUM SERPL-SCNC: 3.9 MMOL/L (ref 3.4–5.3)
PR INTERVAL - MUSE: 110 MS
QRS DURATION - MUSE: 80 MS
QT - MUSE: 394 MS
QTC - MUSE: 422 MS
R AXIS - MUSE: 45 DEGREES
RBC # BLD AUTO: 4.67 10E6/UL (ref 3.8–5.2)
SODIUM SERPL-SCNC: 140 MMOL/L (ref 136–145)
SYSTOLIC BLOOD PRESSURE - MUSE: NORMAL MMHG
T AXIS - MUSE: 27 DEGREES
VENTRICULAR RATE- MUSE: 69 BPM
WBC # BLD AUTO: 4.7 10E3/UL (ref 4–11)

## 2023-07-28 PROCEDURE — 99214 OFFICE O/P EST MOD 30 MIN: CPT | Mod: 25 | Performed by: EMERGENCY MEDICINE

## 2023-07-28 PROCEDURE — 85027 COMPLETE CBC AUTOMATED: CPT | Performed by: EMERGENCY MEDICINE

## 2023-07-28 PROCEDURE — 80048 BASIC METABOLIC PNL TOTAL CA: CPT | Performed by: EMERGENCY MEDICINE

## 2023-07-28 PROCEDURE — 36415 COLL VENOUS BLD VENIPUNCTURE: CPT | Performed by: EMERGENCY MEDICINE

## 2023-07-28 PROCEDURE — 93010 ELECTROCARDIOGRAM REPORT: CPT | Performed by: INTERNAL MEDICINE

## 2023-07-28 PROCEDURE — 93005 ELECTROCARDIOGRAM TRACING: CPT | Performed by: EMERGENCY MEDICINE

## 2023-07-28 NOTE — PROGRESS NOTES
Impression:  Palpitations probably due to PACs.  No evidence for atrial fibrillation.  The patient does have a short TX interval on EKG but no delta waves    Plan:  Avoid caffeine, follow-up with cardiology for further evaluation of the short TX interval      Chief Complaint:  Patient presents with:  Palpitations: Patient states her heart was beating weird and did an EKG on her watch and told her she had afib.          HPI:   Mer Gonzalez is a 28 year old female who presents to this clinic for the evaluation of palpitations.  The patient noticed onset of palpitations this morning about 8:30 AM.  She had not been doing any unusual exercise or been out in the heat or taking any caffeine or alcohol or drugs.  The palpitations consisted of feeling like her heart rate was rapid, and then she would occasionally have a feeling of a strong heartbeat.  She was wearing a Fitbit and it told her that she was having atrial fibrillation.  She never had any chest pain or tightness or heaviness.  She did not have any shortness of breath but did feel anxious.  No swelling in her legs or pain in her legs.  She has been under increased stress the past few days related to some events in her life      PMH:   Past Medical History:   Diagnosis Date    Hypertension      Past Surgical History:   Procedure Laterality Date    DILATION AND CURETTAGE  11/2022    Done for fibroids at [place] [facility]    TONSILLECTOMY      WISDOM TOOTH EXTRACTION  01/01/2011         ROS:  All other systems negative    Meds:    Current Outpatient Medications:     amLODIPine (NORVASC) 5 MG tablet, Take 1 tablet (5 mg) by mouth daily, Disp: 90 tablet, Rfl: 2    albuterol (PROAIR HFA/PROVENTIL HFA/VENTOLIN HFA) 108 (90 Base) MCG/ACT inhaler, Inhale 2 puffs into the lungs every 6 hours as needed for shortness of breath, wheezing or cough (Patient not taking: Reported on 7/28/2023), Disp: 18 g, Rfl:     etonogestrel-ethinyl estradiol (NUVARING) 0.12-0.015 MG/24HR  vaginal ring, Insert one (1) ring vaginally and leave in place for 3 consecutive weeks (21 days), then remove for 1 week. (Patient not taking: Reported on 7/28/2023), Disp: 3 each, Rfl: 4        Social:  Social History     Socioeconomic History    Marital status: Single     Spouse name: Not on file    Number of children: Not on file    Years of education: Not on file    Highest education level: Not on file   Occupational History    Not on file   Tobacco Use    Smoking status: Never    Smokeless tobacco: Never   Vaping Use    Vaping Use: Never used   Substance and Sexual Activity    Alcohol use: Not Currently    Drug use: Never    Sexual activity: Yes     Partners: Male     Birth control/protection: Condom   Other Topics Concern    Not on file   Social History Narrative    Not on file     Social Determinants of Health     Financial Resource Strain: Not on file   Food Insecurity: Not on file   Transportation Needs: Not on file   Physical Activity: Not on file   Stress: Not on file   Social Connections: Not on file   Intimate Partner Violence: Not At Risk (10/28/2022)    Humiliation, Afraid, Rape, and Kick questionnaire     Fear of Current or Ex-Partner: No     Emotionally Abused: No     Physically Abused: No     Sexually Abused: No   Housing Stability: Not on file         Physical Exam:  Vitals:    07/28/23 1316   BP: 116/81   Pulse: 75   Resp: 20   Temp: 98.2  F (36.8  C)   TempSrc: Oral   SpO2: 98%   Weight: 79.4 kg (175 lb)      Patient is awake, alert, no distress  Eyes: PERRL, EOMI  Head: Atraumatic and normocephalic  Neck: No mass or tenderness  Lungs: Clear without distress  CV: Regular without murmur  Skin: No lesions or rash  Neuro: Normal motor and sensory function in all extremities  Psych: Awake, alert, normally responsive      Results:  I reviewed the tracing from her Fitbit that was on her phone.  It shows sinus rhythm with occasional PACs.  There is no atrial fibrillation.    The EKG here shows normal  sinus rhythm with a rate of 69 bpm, occasional PACs, short CO interval        Joni Farley MD

## 2023-08-02 ENCOUNTER — OFFICE VISIT (OUTPATIENT)
Dept: CARDIOLOGY | Facility: CLINIC | Age: 28
End: 2023-08-02
Payer: COMMERCIAL

## 2023-08-02 VITALS
HEART RATE: 63 BPM | WEIGHT: 165 LBS | HEIGHT: 62 IN | SYSTOLIC BLOOD PRESSURE: 120 MMHG | DIASTOLIC BLOOD PRESSURE: 80 MMHG | BODY MASS INDEX: 30.36 KG/M2

## 2023-08-02 DIAGNOSIS — I10 ESSENTIAL HYPERTENSION: ICD-10-CM

## 2023-08-02 DIAGNOSIS — I49.1 PAC (PREMATURE ATRIAL CONTRACTION): ICD-10-CM

## 2023-08-02 DIAGNOSIS — R00.2 PALPITATIONS: Primary | ICD-10-CM

## 2023-08-02 PROCEDURE — 99203 OFFICE O/P NEW LOW 30 MIN: CPT | Performed by: INTERNAL MEDICINE

## 2023-08-02 NOTE — LETTER
8/2/2023    Lesly Emery, CNP  [address] [place] [address]    RE: Mer Gonzalez       Dear Colleague,     I had the pleasure of seeing Mer Gonzalez in the [facility].  HPI and Plan:   Mer is a very nice 28-year-old woman with past medical history significant for hypertension on amlodipine.  She is now referred for palpitations.    Mer has irregular heartbeats, skips, jumps in flip-flops which she notes is most closely associated with stressful periods.  It comes on at rest.  It never bothers her with activity or exercise.  Recently she underwent a very stressful incident where an ambulance arrived and was picking up her mother and she had quite a bit of her palpitations.  She has a Fitbit watch which called it atrial fibrillation.    Work-up to date includes normal BMP, CBC, thyroid function test.  Her EKG demonstrates normal sinus rhythm with a short OH interval but no delta waves.  She had a 12-day Zio patch which demonstrated only PACs.  Average heart rate is 74 ranging from 50-1 39.  Her rhythm strip from her Fitbit shows normal sinus rhythm with frequent PACs.  It is not A-fib.    She notes no limitations in her ability to exercise or perform activities of daily living.    Assessment and plan.  Mer appears to be having just PACs.  We talked about their benign nature.  Talked about the fact that they can be exacerbated by stress, caffeine, alcohol, poor sleep at nighttime.  They are better with regular exercise and a predominately plant based high in potassium and magnesium.    She does have high cholesterol.  She relates coronary disease does run on her father side of the family.  She is too young to consider a calcium score.  We talked about the importance of eating a healthy diet, exercising regularly and maintaining ideal body weight.  She is a lifelong non-smoker.  She does not snore nor have daytime somnolence.    I do not think she needs any further cardiac work-up  or evaluation at this time.  I reassured her of the benign nature.  Obviously if she were to have problems with recurrent nuisance PACs one could consider changing her amlodipine to diltiazem or possibly a beta-blocker.    We would be glad to see her back at any time.  Thank you for allow me to participate in this patient's care.  Sincerely,                               Jimbo Coats MD [facility]      Today's clinic visit entailed:  Review of the result(s) of each unique test - event monitor, lab work  Prescription drug management  31 minutes spent by me on the date of the encounter doing chart review, history and exam, documentation and further activities per the note  Provider  Link to [employer] Help Grid           No orders of the defined types were placed in this encounter.      No orders of the defined types were placed in this encounter.      There are no discontinued medications.      Encounter Diagnoses   Name Primary?    Palpitations Yes    Essential hypertension     PAC (premature atrial contraction)        CURRENT MEDICATIONS:  Current Outpatient Medications   Medication Sig Dispense Refill    albuterol (PROAIR HFA/PROVENTIL HFA/VENTOLIN HFA) 108 (90 Base) MCG/ACT inhaler Inhale 2 puffs into the lungs every 6 hours as needed for shortness of breath, wheezing or cough 18 g     amLODIPine (NORVASC) 5 MG tablet Take 1 tablet (5 mg) by mouth daily 90 tablet 2    etonogestrel-ethinyl estradiol (NUVARING) 0.12-0.015 MG/24HR vaginal ring Insert one (1) ring vaginally and leave in place for 3 consecutive weeks (21 days), then remove for 1 week. (Patient not taking: Reported on 7/28/2023) 3 each 4       ALLERGIES     Allergies   Allergen Reactions    Amoxicillin Hives    Cephalexin      Other reaction(s): brain fog, emotional    Sulfamethoxazole-Trimethoprim [Sulfamethoxazole-Trimethoprim] Unknown    Latex Hives, Itching, Rash and Swelling       PAST MEDICAL HISTORY:  Past Medical History:   Diagnosis Date     "Hypertension        PAST SURGICAL HISTORY:  Past Surgical History:   Procedure Laterality Date    DILATION AND CURETTAGE  11/2022    Done for fibroids at [place] [facility]    TONSILLECTOMY      WISDOM TOOTH EXTRACTION  01/01/2011       FAMILY HISTORY:  Family History   Problem Relation Age of Onset    Kidney Cancer Father         age 48    Allergic rhinitis Sister        SOCIAL HISTORY:  Social History     Socioeconomic History    Marital status: Single     Spouse name: None    Number of children: None    Years of education: None    Highest education level: None   Tobacco Use    Smoking status: Never    Smokeless tobacco: Never   Vaping Use    Vaping Use: Never used   Substance and Sexual Activity    Alcohol use: Not Currently    Drug use: Never    Sexual activity: Yes     Partners: Male     Birth control/protection: Condom     Social Determinants of Health     Intimate Partner Violence: Not At Risk (10/28/2022)    Humiliation, Afraid, Rape, and Kick questionnaire     Fear of Current or Ex-Partner: No     Emotionally Abused: No     Physically Abused: No     Sexually Abused: No       Review of Systems:  Skin:          Eyes:         ENT:         Respiratory:  Negative       Cardiovascular:    palpitations;Positive for    Gastroenterology:        Genitourinary:         Musculoskeletal:         Neurologic:         Psychiatric:         Heme/Lymph/Imm:         Endocrine:           Physical Exam:  Vitals: /80   Pulse 63   Ht 1.575 m (5' 2\")   Wt 74.8 kg (165 lb)   LMP 07/25/2023 (Approximate)   BMI 30.18 kg/m      Constitutional:  cooperative, alert and oriented, well developed, well nourished, in no acute distress overweight      Skin:  warm and dry to the touch, no apparent skin lesions or masses noted          Head:  normocephalic, no masses or lesions        Eyes:  pupils equal and round, conjunctivae and lids unremarkable, sclera white, no xanthalasma, EOMS intact, no nystagmus        Lymph:      ENT:  no " pallor or cyanosis, dentition good        Neck:  no carotid bruit        Respiratory:  normal breath sounds, clear to auscultation, normal A-P diameter, normal symmetry, normal respiratory excursion, no use of accessory muscles         Cardiac: regular rhythm;no murmurs, gallops or rubs detected                pulses full and equal                                        GI:           Extremities and Muscular Skeletal:  no edema;no spinal abnormalities noted;normal muscle strength and tone              Neurological:  no gross motor deficits        Psych:  affect appropriate, oriented to time, person and place          Joni Farley MD  [facility]  [address] [place] [address]      Thank you for allowing me to participate in the care of your patient.      Sincerely,     Jimbo Coats MD     [facility]

## 2023-08-02 NOTE — PATIENT INSTRUCTIONS
It was a pleasure seeing you today and thank you for allowing me to be a part of your health care team.  Should   you have any questions regarding your visit or future needs please feel free to reach out to my care team for assistance.      Thank you, Dr. Jimbo Coats        **Nursing: [phone]       **Scheduling: [phone]

## 2023-08-02 NOTE — PROGRESS NOTES
HPI and Plan:   Mer is a very nice 28-year-old woman with past medical history significant for hypertension on amlodipine.  She is now referred for palpitations.    Mer has irregular heartbeats, skips, jumps in flip-flops which she notes is most closely associated with stressful periods.  It comes on at rest.  It never bothers her with activity or exercise.  Recently she underwent a very stressful incident where an ambulance arrived and was picking up her mother and she had quite a bit of her palpitations.  She has a Fitbit watch which called it atrial fibrillation.    Work-up to date includes normal BMP, CBC, thyroid function test.  Her EKG demonstrates normal sinus rhythm with a short SC interval but no delta waves.  She had a 12-day Zio patch which demonstrated only PACs.  Average heart rate is 74 ranging from 50-1 39.  Her rhythm strip from her Fitbit shows normal sinus rhythm with frequent PACs.  It is not A-fib.    She notes no limitations in her ability to exercise or perform activities of daily living.    Assessment and plan.  Mer appears to be having just PACs.  We talked about their benign nature.  Talked about the fact that they can be exacerbated by stress, caffeine, alcohol, poor sleep at nighttime.  They are better with regular exercise and a predominately plant based high in potassium and magnesium.    She does have high cholesterol.  She relates coronary disease does run on her father side of the family.  She is too young to consider a calcium score.  We talked about the importance of eating a healthy diet, exercising regularly and maintaining ideal body weight.  She is a lifelong non-smoker.  She does not snore nor have daytime somnolence.    I do not think she needs any further cardiac work-up or evaluation at this time.  I reassured her of the benign nature.  Obviously if she were to have problems with recurrent nuisance PACs one could consider changing her amlodipine to diltiazem or possibly a  beta-blocker.    We would be glad to see her back at any time.  Thank you for allow me to participate in this patient's care.  Sincerely,                               Jimbo Coats MD [facility]      Today's clinic visit entailed:  Review of the result(s) of each unique test - event monitor, lab work  Prescription drug management  31 minutes spent by me on the date of the encounter doing chart review, history and exam, documentation and further activities per the note  Provider  Link to MDM Help Grid           No orders of the defined types were placed in this encounter.      No orders of the defined types were placed in this encounter.      There are no discontinued medications.      Encounter Diagnoses   Name Primary?    Palpitations Yes    Essential hypertension     PAC (premature atrial contraction)        CURRENT MEDICATIONS:  Current Outpatient Medications   Medication Sig Dispense Refill    albuterol (PROAIR HFA/PROVENTIL HFA/VENTOLIN HFA) 108 (90 Base) MCG/ACT inhaler Inhale 2 puffs into the lungs every 6 hours as needed for shortness of breath, wheezing or cough 18 g     amLODIPine (NORVASC) 5 MG tablet Take 1 tablet (5 mg) by mouth daily 90 tablet 2    etonogestrel-ethinyl estradiol (NUVARING) 0.12-0.015 MG/24HR vaginal ring Insert one (1) ring vaginally and leave in place for 3 consecutive weeks (21 days), then remove for 1 week. (Patient not taking: Reported on 7/28/2023) 3 each 4       ALLERGIES     Allergies   Allergen Reactions    Amoxicillin Hives    Cephalexin      Other reaction(s): brain fog, emotional    Sulfamethoxazole-Trimethoprim [Sulfamethoxazole-Trimethoprim] Unknown    Latex Hives, Itching, Rash and Swelling       PAST MEDICAL HISTORY:  Past Medical History:   Diagnosis Date    Hypertension        PAST SURGICAL HISTORY:  Past Surgical History:   Procedure Laterality Date    DILATION AND CURETTAGE  11/2022    Done for fibroids at [place] [facility]    TONSILLECTOMY      WISDOM TOOTH  "EXTRACTION  01/01/2011       FAMILY HISTORY:  Family History   Problem Relation Age of Onset    Kidney Cancer Father         age 48    Allergic rhinitis Sister        SOCIAL HISTORY:  Social History     Socioeconomic History    Marital status: Single     Spouse name: None    Number of children: None    Years of education: None    Highest education level: None   Tobacco Use    Smoking status: Never    Smokeless tobacco: Never   Vaping Use    Vaping Use: Never used   Substance and Sexual Activity    Alcohol use: Not Currently    Drug use: Never    Sexual activity: Yes     Partners: Male     Birth control/protection: Condom     Social Determinants of Health     Intimate Partner Violence: Not At Risk (10/28/2022)    Humiliation, Afraid, Rape, and Kick questionnaire     Fear of Current or Ex-Partner: No     Emotionally Abused: No     Physically Abused: No     Sexually Abused: No       Review of Systems:  Skin:          Eyes:         ENT:         Respiratory:  Negative       Cardiovascular:    palpitations;Positive for    Gastroenterology:        Genitourinary:         Musculoskeletal:         Neurologic:         Psychiatric:         Heme/Lymph/Imm:         Endocrine:           Physical Exam:  Vitals: /80   Pulse 63   Ht 1.575 m (5' 2\")   Wt 74.8 kg (165 lb)   LMP 07/25/2023 (Approximate)   BMI 30.18 kg/m      Constitutional:  cooperative, alert and oriented, well developed, well nourished, in no acute distress overweight      Skin:  warm and dry to the touch, no apparent skin lesions or masses noted          Head:  normocephalic, no masses or lesions        Eyes:  pupils equal and round, conjunctivae and lids unremarkable, sclera white, no xanthalasma, EOMS intact, no nystagmus        Lymph:      ENT:  no pallor or cyanosis, dentition good        Neck:  no carotid bruit        Respiratory:  normal breath sounds, clear to auscultation, normal A-P diameter, normal symmetry, normal respiratory excursion, no use " of accessory muscles         Cardiac: regular rhythm;no murmurs, gallops or rubs detected                pulses full and equal                                        GI:           Extremities and Muscular Skeletal:  no edema;no spinal abnormalities noted;normal muscle strength and tone              Neurological:  no gross motor deficits        Psych:  affect appropriate, oriented to time, person and place        CC  Joni Farley MD  [facility]  [address] [place] [address]

## 2023-08-03 ENCOUNTER — MYC MEDICAL ADVICE (OUTPATIENT)
Dept: CARDIOLOGY | Facility: CLINIC | Age: 28
End: 2023-08-03
Payer: COMMERCIAL

## 2023-08-03 NOTE — TELEPHONE ENCOUNTER
Patient met Dr Coats yesterday in consult for palpitations, wore a 12-day heart monitor and was diagnosed with frequent PACs, no further cardiac workup required. ED precautions reviewed with the patient. Provider messaged to review.       Mer Najera [facility] Heart Team 2 (supporting Jimbo Coats MD)1 hour ago (7:46 AM)     Sorry to keep bothering you, I just wanted to include that my left arm is feeling just heavy and tired and almost hot. No pain but just feeling almost sore muscle from a workout which I did not do.   Thanks    Mer Najera [facility] Heart Team 2 (supporting Jimbo Coats MD)1 hour ago (7:30 AM)     I was also looking and noticed my resting heart rate average has gone down about 5 bpm in the last week too. It has been at about 62 bpm average for the last few months and has dropped down to 57 bpm now for multiple days now.   Thank you.     Mer Najera [facility] Heart Team 2 (supporting Jimbo Coats MD)1 hour ago (7:01 AM)     Good morning,     I just wanted to reach out and share that I had another weird heart feeling this morning. I did so my Fitbit ECG again and it came back AFib once again (which as we discussed it is picking up incorrectly). I wanted to attach the results from that just to make sure I shouldn't do a follow up for this.      Thank you,   Mer Gonzalez

## 2023-08-03 NOTE — TELEPHONE ENCOUNTER
Dr Coats's reply routed to patient-    Jorge L, MD Bud Stephens, Sally HAMM RN  Phone Number: [phone]     Transmitted strips again are normal sinus rhythm with frequent PACs.  She is very unlikely to have A-fib.  Maybe she should change her Fitbit to an Apple Watch.  I do not think her arm pain is anything cardiac in origin.  I think she is having stress and anxiety.  If she is concerned lets do a stress echo but I think this is overkill for her PACs in a young healthy 28-year-old who has no exertional chest discomfort.  I will try to reassure her.  Asked her more questions about her arm pain is she laying on that side what may be triggering it?

## 2023-08-22 NOTE — PROGRESS NOTES
"CHIEF COMPLAINT: Patient presents with:  Consult: About 1yr ago started having pressure in head, some sinus pressure, hoarseness          HISTORY OF PRESENT ILLNESS    Mer was seen at the behest of Matt Butler MD for head pressure and dizziness  Patent relates pressure over her cheeks and forehead for 3-45 minutes.  No specific triggers except riding in a car.   She has a history of migranes at age 18. No associated with menstruation.    Imaging thus far has not been revealing.  Has been seen by neurology, who referred her an ophthalmologist.       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 occasional L sided tinnitus; not  pulsatile. Denied HL, true vertigo, otalgia, otorrhea, recent illness, noise exposure. Results: Clear canals, bilat. Normal hearing sensitivity  for 250-8000 Hz, bilat. Excellent word rec ability in quiet, bilat. Normal tymps, bilat. Present 1 KHz acoustic reflexes, bilat. Rec: f/u w/ENT;  retest per med. mgmt. or pt. concern. Wear hearing protection consistently in noise to preserve residual hearing sensitivity and to minimize  the effects of noise on tinnitus.    REVIEW OF SYSTEMS    Review of Systems as per HPI and PMHx, otherwise 10 system review system are negative.       ALLERGIES    Amoxicillin, Cephalexin, Sulfamethoxazole-trimethoprim [sulfamethoxazole-trimethoprim], and Latex    CURRENT MEDICATIONS      Current Outpatient Medications:     albuterol (PROAIR HFA/PROVENTIL HFA/VENTOLIN HFA) 108 (90 Base) MCG/ACT inhaler, Inhale 2 puffs into the lungs every 6 hours as needed for shortness of breath, wheezing or cough, Disp: 18 g, Rfl:     amLODIPine (NORVASC) 5 MG tablet, Take 1 tablet (5 mg) by mouth daily, Disp: 90 tablet, Rfl: 2    etonogestrel-ethinyl estradiol (NUVARING) 0.12-0.015 MG/24HR vaginal ring, Insert one (1) ring vaginally and leave in place for 3 consecutive weeks (21 days), then remove " for 1 week. (Patient not taking: Reported on 7/28/2023), Disp: 3 each, Rfl: 4     PAST MEDICAL HISTORY    PAST MEDICAL HISTORY:   Past Medical History:   Diagnosis Date    Hypertension        PAST SURGICAL HISTORY    PAST SURGICAL HISTORY:   Past Surgical History:   Procedure Laterality Date    DILATION AND CURETTAGE  11/2022    [facility]roids [facility] [place] [facility]    TONSILLECTOMY      WISDOM TOOTH EXTRACTION  01/01/2011       FAMILY  HISTORY    FAMILY HISTORY:   Family History   Problem Relation Age of Onset    Kidney Cancer Father         age 48    Allergic rhinitis Sister        SOCIAL HISTORY    SOCIAL HISTORY:   Social History     Tobacco Use    Smoking status: Never    Smokeless tobacco: Never   Substance Use Topics    Alcohol use: Not Currently        PHYSICAL EXAM    HEAD: Normal appearance and symmetry:  No cutaneous lesions.      NECK:  supple     EARS:    Right:   TM intact nl   LEFT:  TM intact nl    EYES:  EOMI    CN VII/XII:  intact     NOSE:     Dorsum:   straight  Septum:  slight posterior deflection left  Mucosa:  moist        ORAL CAVITY/OROPHARYNX:     Lips:  Normal.  Tongue: normal, midline  Mucosa:   no lesions     NECK:  Trachea:  midline.              Thyroid:  normal              Adenopathy:  none        NEURO:   Alert and Oriented     GAIT AND STATION:  normal     RESPIRATORY:   Symmetry and Respiratory effort     PSYCH:  Normal mood and affect     SKIN:   warm and dry         IMPRESSION:    Encounter Diagnosis   Name Primary?    Dysfunction of both eustachian tubes           RECOMMENDATIONS:    Migraine diet  Mag oxide as directed  Return as needed

## 2023-08-23 ENCOUNTER — OFFICE VISIT (OUTPATIENT)
Dept: OTOLARYNGOLOGY | Facility: CLINIC | Age: 28
End: 2023-08-23
Attending: INTERNAL MEDICINE
Payer: COMMERCIAL

## 2023-08-23 ENCOUNTER — OFFICE VISIT (OUTPATIENT)
Dept: AUDIOLOGY | Facility: CLINIC | Age: 28
End: 2023-08-23
Attending: INTERNAL MEDICINE
Payer: COMMERCIAL

## 2023-08-23 DIAGNOSIS — H93.8X3 SENSATION OF FULLNESS IN BOTH EARS: Primary | ICD-10-CM

## 2023-08-23 DIAGNOSIS — H93.8X9 EAR POPPING, UNSPECIFIED LATERALITY: Primary | ICD-10-CM

## 2023-08-23 DIAGNOSIS — H93.8X9 EAR POPPING, UNSPECIFIED LATERALITY: ICD-10-CM

## 2023-08-23 DIAGNOSIS — G43.009 MIGRAINE WITHOUT AURA AND WITHOUT STATUS MIGRAINOSUS, NOT INTRACTABLE: Primary | ICD-10-CM

## 2023-08-23 DIAGNOSIS — Z01.10 NORMAL HEARING NOTED ON EXAMINATION: ICD-10-CM

## 2023-08-23 DIAGNOSIS — Z00.00 NORMAL ENT EXAM: ICD-10-CM

## 2023-08-23 DIAGNOSIS — R42 MIGRAINOUS DIZZINESS: ICD-10-CM

## 2023-08-23 DIAGNOSIS — H93.12 TINNITUS OF LEFT EAR: ICD-10-CM

## 2023-08-23 DIAGNOSIS — R42 DIZZINESS AND GIDDINESS: ICD-10-CM

## 2023-08-23 PROCEDURE — 92557 COMPREHENSIVE HEARING TEST: CPT | Performed by: AUDIOLOGIST

## 2023-08-23 PROCEDURE — 92550 TYMPANOMETRY & REFLEX THRESH: CPT | Performed by: AUDIOLOGIST

## 2023-08-23 PROCEDURE — 99203 OFFICE O/P NEW LOW 30 MIN: CPT | Performed by: OTOLARYNGOLOGY

## 2023-08-23 RX ORDER — MAGNESIUM OXIDE 400 MG/1
400 TABLET ORAL DAILY
Qty: 90 TABLET | Refills: 3 | Status: SHIPPED | OUTPATIENT
Start: 2023-08-23 | End: 2023-09-11

## 2023-08-23 NOTE — PROGRESS NOTES
AUDIOLOGY REPORT    SUMMARY: Audiology visit completed. See audiogram for results. Abuse screening not completed due to same day appt with ENT clinic, where this is addressed.      RECOMMENDATIONS: Follow-up with ENT.      Yenny Alonso, [facility]  [place] Licensed Audiologist [id number]

## 2023-08-23 NOTE — PATIENT INSTRUCTIONS
"Migraine Diet       Food may play a significant role in the frequency of migraine. Although some migraine patients find that eating certain foods will provoke symptoms every single time, the effect of diet may be less obvious.    In general, the more \"trigger\" foods you consume, the more symptoms you may have. By avoiding these possible triggers, you can minimize your symptoms.     Eating regularly timed meals, avoiding hunger, avoiding dehydration, and avoiding skipping meals      Try following this list as strictly as possible for at least two months.     You may gradually add back your favorite foods one at a time, keeping track of your headaches as you do so.     Category  Foods to Avoid, Reduce, or Limit  Foods that are OK    Caffeine  No more than 2 servings / day. Do not vary the amount or timing from day to day. Coffee, tea, clare, Mountain Dew, Sunkist, certain medications (Anacin, Excedrin)  Decaffeinated coffee, herbal or green tea, caffeine-free sodas, fruit juice (see below)    Snacks / Desserts  Chocolate, nuts (peanuts, especially), peanut butter, seeds  Fruits listed below, sherbet, ice cream, cakes, pudding, Jello, sugar, jam, jelly, honey, hard candy, cookies made w/o chocolate or nuts    Alcohol  Avoid all, especially: ales, Burgundy, chianti, malted beers, red wine, sheri, vermouth. Note: some medications contain alcohol (Nyquil)  Non-alcoholic beverages    Dairy  Aged cheeses: Brie, blue, boursault, brick, Camembert, cheddar, Emmenlalaer, gouda, mozzarella, Parmesan, Provolone, Jean, Roquefort, stilton, Swiss, etc.   Buttermilk, chocolate milk, sour cream   Eggs and yogurt should be limited to 2-3 times per week  Other cheeses: American, cottage, cream cheese, farmer, ricotta, Velveeta.   Milk,   Egg substitute    Cereals & Grains  Fresh breads and yeast products, fresh bagels, fresh doughnuts, yeast extracts, smith's yeast, sourdough   (*freezing bread may inactivate yeast)  Commercial " "breads (white, wheat, rye, multi-grain, Italian), English Muffins, crackers, rye, toast, bagels, potatoes, rice, spaghetti, noodles, hot or dried cereals, oatmeal    Meats  Aged, canned, cured, or processed meats (bologna, pepperoni, salami, other pre-packaged deli meats), pickled meats or fish, salted or dried meats or poultry, hot dogs, sausages, jerky  Fresh / unprocessed meats, poultry, fish, lamb, pork, veal, lamb, tuna    MSG (monosodium glutamate)  Avoid glutamate in all its multiple forms: MSG, \"natural flavoring,\" \"flavor enhancer,\" etc.   Soy sauce, foods containing \"hydrolyzed protein products\" or \"autolyzed yeast\", canned soups, bouillon cubes, Accent, meat tenderizers, seasoned salts. Pickled, preserved or marinated foods  Salt and other spices, butter, margarine, cooking oil, white vinegar, salad dressing (small amounts)    Sweeteners  Aspartame (Equal, Nutrasweet) (somewhat controversial)  Sucrose (sugar), high fructose corn syrup, sucralose (Splenda), saccharin (Sweet 'n Low)    Vegetables  Pole or broad beans, lima beans, Italian beans, lentils, snow peas, sylvester beans, Navy beans, lua beans, pea pods, sauerkraut, garbanzo beans, onions, olives, pickles  Asparagus, beets, broccoli, carrots, corn, lettuce, pumpkins, spinach, squash, string beans, tomatoes- all those not listed    Fruit  Avocados, figs, papaya, passion fruit, raisins, red plums. Limit bananas and citrus fruit & juice (orange, lemon, lime, grapefruit, tangerines) to   cup per day  Apples, berries, peaches, pears, prunes, fruit cocktail    Mixed Dishes  Frozen meals/Microwave ready        Download and read \"migraine, more than a headache\" by Joni Cortez MD    "

## 2023-08-23 NOTE — LETTER
"    8/23/2023         RE: Mer Gonzalez  [address]  [place] [address]        Dear Colleague,    Thank you for referring your patient, Mer Gonzalez, to the [facility]. Please see a copy of my visit note below.    CHIEF COMPLAINT: Patient presents with:  Consult: About 1yr ago started having pressure in head, some sinus pressure, hoarseness          HISTORY OF PRESENT ILLNESS    Mer was seen at the behest of Matt Butler MD for head pressure and dizziness  Patent relates pressure over her cheeks and forehead for 3-45 minutes.  No specific triggers except riding in a car.   She has a history of migranes at age 18. No associated with menstruation.    Imaging thus far has not been revealing.  Has been seen by neurology, who referred her an ophthalmologist.       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 occasional L sided tinnitus; not  pulsatile. Denied HL, true vertigo, otalgia, otorrhea, recent illness, noise exposure. Results: Clear canals, bilat. Normal hearing sensitivity  for 250-8000 Hz, bilat. Excellent word rec ability in quiet, bilat. Normal tymps, bilat. Present 1 KHz acoustic reflexes, bilat. Rec: f/u w/ENT;  retest per med. mgmt. or pt. concern. Wear hearing protection consistently in noise to preserve residual hearing sensitivity and to minimize  the effects of noise on tinnitus.    REVIEW OF SYSTEMS    Review of Systems as per HPI and PMHx, otherwise 10 system review system are negative.       ALLERGIES    Amoxicillin, Cephalexin, Sulfamethoxazole-trimethoprim [sulfamethoxazole-trimethoprim], and Latex    CURRENT MEDICATIONS      Current Outpatient Medications:      albuterol (PROAIR HFA/PROVENTIL HFA/VENTOLIN HFA) 108 (90 Base) MCG/ACT inhaler, Inhale 2 puffs into the lungs every 6 hours as needed for shortness of breath, wheezing or cough, Disp: 18 g, Rfl:      amLODIPine " (NORVASC) 5 MG tablet, Take 1 tablet (5 mg) by mouth daily, Disp: 90 tablet, Rfl: 2     etonogestrel-ethinyl estradiol (NUVARING) 0.12-0.015 MG/24HR vaginal ring, Insert one (1) ring vaginally and leave in place for 3 consecutive weeks (21 days), then remove for 1 week. (Patient not taking: Reported on 7/28/2023), Disp: 3 each, Rfl: 4     PAST MEDICAL HISTORY    PAST MEDICAL HISTORY:   Past Medical History:   Diagnosis Date     Hypertension        PAST SURGICAL HISTORY    PAST SURGICAL HISTORY:   Past Surgical History:   Procedure Laterality Date     DILATION AND CURETTAGE  11/2022    Done [facility]roids [facility] [place] [facility]     TONSILLECTOMY       WISDOM TOOTH EXTRACTION  01/01/2011       FAMILY  HISTORY    FAMILY HISTORY:   Family History   Problem Relation Age of Onset     Kidney Cancer Father         age 48     Allergic rhinitis Sister        SOCIAL HISTORY    SOCIAL HISTORY:   Social History     Tobacco Use     Smoking status: Never     Smokeless tobacco: Never   Substance Use Topics     Alcohol use: Not Currently        PHYSICAL EXAM    HEAD: Normal appearance and symmetry:  No cutaneous lesions.      NECK:  supple     EARS:    Right:   TM intact nl   LEFT:  TM intact nl    EYES:  EOMI    CN VII/XII:  intact     NOSE:     Dorsum:   straight  Septum:  slight posterior deflection left  Mucosa:  moist        ORAL CAVITY/OROPHARYNX:     Lips:  Normal.  Tongue: normal, midline  Mucosa:   no lesions     NECK:  Trachea:  midline.              Thyroid:  normal              Adenopathy:  none        NEURO:   Alert and Oriented     GAIT AND STATION:  normal     RESPIRATORY:   Symmetry and Respiratory effort     PSYCH:  Normal mood and affect     SKIN:   warm and dry         IMPRESSION:    Encounter Diagnosis   Name Primary?     Dysfunction of both eustachian tubes           RECOMMENDATIONS:    Migraine diet  Mag oxide as directed  Return as needed        Again, thank you for allowing me to participate in the care of your  patient.        Sincerely,        Vitaliy Lam MD

## 2023-09-01 ENCOUNTER — LAB (OUTPATIENT)
Dept: LAB | Facility: CLINIC | Age: 28
End: 2023-09-01
Payer: COMMERCIAL

## 2023-09-01 DIAGNOSIS — F32.81 PMDD (PREMENSTRUAL DYSPHORIC DISORDER): ICD-10-CM

## 2023-09-01 DIAGNOSIS — R45.86 MOOD CHANGES: ICD-10-CM

## 2023-09-01 LAB
ESTRADIOL SERPL-MCNC: 119 PG/ML
LH SERPL-ACNC: 9.4 MIU/ML
TSH SERPL DL<=0.005 MIU/L-ACNC: 1.63 UIU/ML (ref 0.3–4.2)

## 2023-09-01 PROCEDURE — 83002 ASSAY OF GONADOTROPIN (LH): CPT

## 2023-09-01 PROCEDURE — 83498 ASY HYDROXYPROGESTERONE 17-D: CPT

## 2023-09-01 PROCEDURE — 84403 ASSAY OF TOTAL TESTOSTERONE: CPT

## 2023-09-01 PROCEDURE — 36415 COLL VENOUS BLD VENIPUNCTURE: CPT

## 2023-09-01 PROCEDURE — 84443 ASSAY THYROID STIM HORMONE: CPT

## 2023-09-01 PROCEDURE — 82670 ASSAY OF TOTAL ESTRADIOL: CPT

## 2023-09-07 LAB
17OHP SERPL-MCNC: 24 NG/DL
TESTOST SERPL-MCNC: 13 NG/DL (ref 8–60)

## 2023-09-11 ENCOUNTER — OFFICE VISIT (OUTPATIENT)
Dept: INTERNAL MEDICINE | Facility: CLINIC | Age: 28
End: 2023-09-11
Payer: COMMERCIAL

## 2023-09-11 VITALS
OXYGEN SATURATION: 99 % | HEIGHT: 62 IN | SYSTOLIC BLOOD PRESSURE: 112 MMHG | WEIGHT: 163 LBS | HEART RATE: 86 BPM | BODY MASS INDEX: 30 KG/M2 | TEMPERATURE: 98.1 F | DIASTOLIC BLOOD PRESSURE: 80 MMHG | RESPIRATION RATE: 18 BRPM

## 2023-09-11 DIAGNOSIS — B96.89 BV (BACTERIAL VAGINOSIS): ICD-10-CM

## 2023-09-11 DIAGNOSIS — K59.01 SLOW TRANSIT CONSTIPATION: ICD-10-CM

## 2023-09-11 DIAGNOSIS — N89.8 VAGINAL DISCHARGE: Primary | ICD-10-CM

## 2023-09-11 DIAGNOSIS — N76.0 BV (BACTERIAL VAGINOSIS): ICD-10-CM

## 2023-09-11 DIAGNOSIS — R30.0 DYSURIA: ICD-10-CM

## 2023-09-11 LAB

## 2023-09-11 PROCEDURE — 81001 URINALYSIS AUTO W/SCOPE: CPT | Performed by: NURSE PRACTITIONER

## 2023-09-11 PROCEDURE — 87210 SMEAR WET MOUNT SALINE/INK: CPT | Performed by: NURSE PRACTITIONER

## 2023-09-11 PROCEDURE — 99214 OFFICE O/P EST MOD 30 MIN: CPT | Performed by: NURSE PRACTITIONER

## 2023-09-11 RX ORDER — METRONIDAZOLE 500 MG/1
500 TABLET ORAL 2 TIMES DAILY
Qty: 14 TABLET | Refills: 0 | Status: SHIPPED | OUTPATIENT
Start: 2023-09-11 | End: 2023-09-18

## 2023-09-11 NOTE — PATIENT INSTRUCTIONS
Push water intake.    Push fiber intake, daily goals are 21 grams of fiber per day.    I think the pressure is being caused by constipation. If you need to take daily miralax to help get the stool, this is okay.    Take the Flagyl on tablet twice daily x 7 days for the bacterial overgrowth in your vagina.    Urine is processing, I will release results once they are back.    Follow up if symptoms persist or worsen.

## 2023-09-11 NOTE — PROGRESS NOTES
"  Assessment & Plan     Vaginal discharge/BV (bacterial vaginosis): Wet prep was positive for bacterial overgrowth. Will treat with oral Flagyl. Rest, push fluids. Follow up if symptoms persist or worsen.   - Wet prep - Clinic Collect  - metroNIDAZOLE (FLAGYL) 500 MG tablet  Dispense: 14 tablet; Refill: 0    Dysuria: One day course, symptoms resolved today. Urine is processing.   - UA Macroscopic with reflex to Microscopic and Culture - Lab Collect    Slow transit constipation: Bowel habits changed with vacation. Discussed goals of 21 grams of fiber per day, pushing water intake, staying active. Miralax as needed. Follow up if symptoms persist.      BMI:   Estimated body mass index is 29.81 kg/m  as calculated from the following:    Height as of this encounter: 1.575 m (5' 2\").    Weight as of this encounter: 73.9 kg (163 lb).   Weight management plan: Discussed healthy diet and exercise guidelines      YURI[facility] [place]    Isaac Del Angel is a 28 year old, presenting for the following health issues:  Pain (Pressure and pain anal area for 3 weeks)        9/11/2023     4:29 PM   Additional Questions   Roomed by Alycia VILLATORO       History of Present Illness       Reason for visit:  Anus pain, also uti/bv symtoms  Symptom onset:  1-2 weeks ago  Symptoms include:  Anus pain and pressure  Symptom intensity:  Mild  Symptom progression:  Staying the same  Had these symptoms before:  No  What makes it worse:  Sitting or laying down in certain positions  What makes it better:  Using rest room    She eats 2-3 servings of fruits and vegetables daily.She consumes 0 sweetened beverage(s) daily.She exercises with enough effort to increase her heart rate 10 to 19 minutes per day.  She exercises with enough effort to increase her heart rate 3 or less days per week.   She is taking medications regularly.     The patient presents today with a three week course of anal pressure. She " "reports that she feels a dull ache, almost like muscle soreness in her anal area that comes and goes. She recently was on a road tip to [place], and her bowel habits changed, since being home she is going every other day. She denies any blood in her stool.    Yesterday she had some discomfort with urination, but this resolved today.    She denies any fever, chills, nausea, or vomiting.     Review of Systems   Constitutional, HEENT, cardiovascular, pulmonary, GI, , musculoskeletal, neuro, skin, endocrine and psych systems are negative, except as otherwise noted.      Objective    /80 (BP Location: Right arm, Patient Position: Sitting)   Pulse 86   Temp 98.1  F (36.7  C)   Resp 18   Ht 1.575 m (5' 2\")   Wt 73.9 kg (163 lb)   LMP 08/23/2023 (Approximate)   SpO2 99%   BMI 29.81 kg/m    Body mass index is 29.81 kg/m .  Physical Exam  Vitals and nursing note reviewed.   Constitutional:       Appearance: Normal appearance. She is normal weight.   HENT:      Head: Normocephalic and atraumatic.   Cardiovascular:      Rate and Rhythm: Normal rate and regular rhythm.      Pulses: Normal pulses.      Heart sounds: Normal heart sounds. No murmur heard.     No friction rub. No gallop.   Pulmonary:      Effort: Pulmonary effort is normal.      Breath sounds: Normal breath sounds.   Abdominal:      General: Abdomen is flat. Bowel sounds are normal.      Palpations: Abdomen is soft.      Tenderness: There is abdominal tenderness in the epigastric area and periumbilical area. There is no right CVA tenderness, left CVA tenderness, guarding or rebound. Negative signs include Multani's sign, Rovsing's sign, McBurney's sign, psoas sign and obturator sign.      Hernia: No hernia is present. There is no hernia in the left inguinal area or right inguinal area.       Genitourinary:     Pubic Area: No rash.       Labia:         Right: No rash or tenderness.         Left: No rash or tenderness.       Urethra: No prolapse.      " Vagina: Vaginal discharge present. No erythema, tenderness or bleeding.   Musculoskeletal:         General: Normal range of motion.   Skin:     General: Skin is warm and dry.   Neurological:      General: No focal deficit present.      Mental Status: She is alert and oriented to person, place, and time. Mental status is at baseline.   Psychiatric:         Mood and Affect: Mood normal.         Behavior: Behavior normal.         Thought Content: Thought content normal.         Judgment: Judgment normal.

## 2023-09-13 ENCOUNTER — HOSPITAL ENCOUNTER (EMERGENCY)
Facility: CLINIC | Age: 28
Discharge: HOME | End: 2023-09-13
Attending: EMERGENCY MEDICINE | Admitting: EMERGENCY MEDICINE
Payer: COMMERCIAL

## 2023-09-13 VITALS
OXYGEN SATURATION: 99 % | RESPIRATION RATE: 16 BRPM | BODY MASS INDEX: 29.26 KG/M2 | SYSTOLIC BLOOD PRESSURE: 128 MMHG | DIASTOLIC BLOOD PRESSURE: 86 MMHG | TEMPERATURE: 97.6 F | WEIGHT: 160 LBS | HEART RATE: 79 BPM

## 2023-09-13 DIAGNOSIS — H81.10 BENIGN PAROXYSMAL POSITIONAL VERTIGO, UNSPECIFIED LATERALITY: ICD-10-CM

## 2023-09-13 LAB
ATRIAL RATE - MUSE: 93 BPM
DIASTOLIC BLOOD PRESSURE - MUSE: NORMAL MMHG
GLUCOSE BLDC GLUCOMTR-MCNC: 101 MG/DL (ref 70–99)
INTERPRETATION ECG - MUSE: NORMAL
P AXIS - MUSE: 5 DEGREES
PR INTERVAL - MUSE: 106 MS
QRS DURATION - MUSE: 78 MS
QT - MUSE: 350 MS
QTC - MUSE: 435 MS
R AXIS - MUSE: 53 DEGREES
SYSTOLIC BLOOD PRESSURE - MUSE: NORMAL MMHG
T AXIS - MUSE: 31 DEGREES
VENTRICULAR RATE- MUSE: 93 BPM

## 2023-09-13 PROCEDURE — 82962 GLUCOSE BLOOD TEST: CPT

## 2023-09-13 PROCEDURE — 99284 EMERGENCY DEPT VISIT MOD MDM: CPT

## 2023-09-13 PROCEDURE — 250N000013 HC RX MED GY IP 250 OP 250 PS 637: Performed by: EMERGENCY MEDICINE

## 2023-09-13 PROCEDURE — 250N000011 HC RX IP 250 OP 636: Performed by: EMERGENCY MEDICINE

## 2023-09-13 PROCEDURE — 93005 ELECTROCARDIOGRAM TRACING: CPT | Performed by: EMERGENCY MEDICINE

## 2023-09-13 RX ORDER — ONDANSETRON 4 MG/1
4-8 TABLET, ORALLY DISINTEGRATING ORAL EVERY 8 HOURS PRN
Qty: 12 TABLET | Refills: 0 | Status: SHIPPED | OUTPATIENT
Start: 2023-09-13 | End: 2023-09-16

## 2023-09-13 RX ORDER — ONDANSETRON 4 MG/1
4 TABLET, ORALLY DISINTEGRATING ORAL ONCE
Status: COMPLETED | OUTPATIENT
Start: 2023-09-13 | End: 2023-09-13

## 2023-09-13 RX ORDER — MECLIZINE HYDROCHLORIDE 25 MG/1
50 TABLET ORAL ONCE
Status: COMPLETED | OUTPATIENT
Start: 2023-09-13 | End: 2023-09-13

## 2023-09-13 RX ORDER — MECLIZINE HYDROCHLORIDE 25 MG/1
25 TABLET ORAL 3 TIMES DAILY PRN
Qty: 30 TABLET | Refills: 0 | Status: SHIPPED | OUTPATIENT
Start: 2023-09-13 | End: 2023-10-12

## 2023-09-13 RX ADMIN — MECLIZINE HYDROCHLORIDE 50 MG: 25 TABLET ORAL at 12:00

## 2023-09-13 RX ADMIN — ONDANSETRON 4 MG: 4 TABLET, ORALLY DISINTEGRATING ORAL at 12:00

## 2023-09-13 ASSESSMENT — ACTIVITIES OF DAILY LIVING (ADL): ADLS_ACUITY_SCORE: 35

## 2023-09-13 NOTE — ED PROVIDER NOTES
EMERGENCY DEPARTMENT ENCOUNTER      NAME: Mer Gonzalez  AGE: 28 year old female  YOB: 1995  MRN: [id number]  EVALUATION DATE & TIME: 9/13/2023 11:35 AM    PCP: Lesly Emery    ED PROVIDER: Yessenia Hirsch MD    Chief Complaint   Patient presents with    Dizziness         FINAL IMPRESSION:  1. Benign paroxysmal positional vertigo, unspecified laterality          ED COURSE & MEDICAL DECISION MAKING:    Pertinent Labs & Imaging studies reviewed. (See chart for details)  28 year old female with history of HTN, PACs who presents to the Emergency Department for evaluation of vertigo that was sudden in onset today after she bent over and then looked up.  It is exacerbated if she hyperextends the head up.  Patient describes this as room spinning dizziness.  Symptoms seem fairly classic for BPPV.  She does not have any changes in her hearing nor christopher tinnitus to suspect Ménière's.  Seems to this was sudden onset less likely infectious, labyrinthitis.  With her age, reassuring neuro examination overall my concern for central/posterior circulation CVA TIA mass lesion vertebrobasilar insufficiency or other more malignant etiology of her vertigo is low.  Patient placed on monitor, given meclizine, Zofran.  She had improvement of her symptomatology with this.  She still feels somewhat vertiginous when hyperextending it, but it is markedly improved.  She he and her mother at bedside are safe for discharged home.  Will discharge home with Zofran, meclizine and referral to the outpatient dizzy/balance center.  Warning signs return to ED discussed.      ED Course as of 09/13/23 1430   Wed Sep 13, 2023   1140 I met with the patient, obtained history, performed an initial exam, and discussed options and plan for diagnostics and treatment here in the ED.         Medical Decision Making    History:  Supplemental history from: Mother  External Record(s) reviewed: MRI brain October 2022    Work Up:  Chart  documentation includes differential considered and any EKGs or imaging independently interpreted by provider, see MDM  In additional to work up documented, I considered the following work up: see MDM    External consultation:  Discussion of management with another provider: None    Complicating factors:  Care impacted by chronic illness:  Hypertension  Care affected by social determinants of health: N/A    Disposition considerations: Discharge. I prescribed additional prescription strength medication(s) as charted. N/A.        At the conclusion of the encounter I discussed the results of all of the tests and the disposition. The questions were answered. The patient or family acknowledged understanding and was agreeable with the care plan.      MEDICATIONS GIVEN IN THE EMERGENCY:  Medications   ondansetron (ZOFRAN ODT) ODT tab 4 mg (4 mg Oral $Given 9/13/23 1200)   meclizine (ANTIVERT) tablet 50 mg (50 mg Oral $Given 9/13/23 1200)       NEW PRESCRIPTIONS STARTED AT TODAY'S ER VISIT  Discharge Medication List as of 9/13/2023  1:11 PM        START taking these medications    Details   meclizine (ANTIVERT) 25 MG tablet Take 1 tablet (25 mg) by mouth 3 times daily as needed for dizziness, Disp-30 tablet, R-0, Local Print      ondansetron (ZOFRAN ODT) 4 MG ODT tab Take 1-2 tablets (4-8 mg) by mouth every 8 hours as needed for nausea, Disp-12 tablet, R-0, Local Print                =================================================================    HPI    Patient information was obtained from: Patient     Use of Intrepreter: N/A        Mer Gonzalez is a 28 year old female with pertinent medical history of HTN, who presents via walk in accompanied by mom for evaluation of dizziness.     The patient presents with complaint of room spinning dizziness while make breakfast at 9:30 AM. She felt onset dizziness with getting up and unsteady balance. When standing up she feels as if she's going to fall backwards. Symptoms  progressively worsen therefore she called her mom and prompt to the ED. Currently, the patient is feeling much better. The dizziness has subsided. She reports the dizziness will resume when walking or hyperextends her head.  She otherwise denies any ear pain, ear ringing, headache, fever, or any other medical concerns at the moment.     PAST MEDICAL HISTORY:  Past Medical History:   Diagnosis Date    Hypertension        PAST SURGICAL HISTORY:  Past Surgical History:   Procedure Laterality Date    DILATION AND CURETTAGE  11/2022    Done for fibroids [facility] [place] [facility]    TONSILLECTOMY      WISDOM TOOTH EXTRACTION  01/01/2011       CURRENT MEDICATIONS:    Prior to Admission Medications   Prescriptions Last Dose Informant Patient Reported? Taking?   albuterol (PROAIR HFA/PROVENTIL HFA/VENTOLIN HFA) 108 (90 Base) MCG/ACT inhaler   Yes No   Sig: Inhale 2 puffs into the lungs every 6 hours as needed for shortness of breath, wheezing or cough   amLODIPine (NORVASC) 5 MG tablet   No No   Sig: Take 1 tablet (5 mg) by mouth daily   metroNIDAZOLE (FLAGYL) 500 MG tablet   No No   Sig: Take 1 tablet (500 mg) by mouth 2 times daily for 7 days      Facility-Administered Medications: None       ALLERGIES:  Allergies   Allergen Reactions    Amoxicillin Hives    Cephalexin      Other reaction(s): brain fog, emotional    Sulfamethoxazole-Trimethoprim [Sulfamethoxazole-Trimethoprim] Unknown    Latex Hives, Itching, Rash and Swelling       FAMILY HISTORY:  Family History   Problem Relation Age of Onset    Kidney Cancer Father         age 48    Allergic rhinitis Sister        SOCIAL HISTORY:  Social History     Tobacco Use    Smoking status: Never    Smokeless tobacco: Never   Vaping Use    Vaping Use: Never used   Substance Use Topics    Alcohol use: Not Currently    Drug use: Never        VITALS:  Patient Vitals for the past 24 hrs:   BP Temp Temp src Pulse Resp SpO2 Weight   09/13/23 1310 128/86 -- -- 79 -- 99 % --   09/13/23 1125  137/87 97.6  F (36.4  C) Oral 83 16 99 % 72.6 kg (160 lb)       PHYSICAL EXAM    General Appearance: Well-appearing, well-nourished, no acute distress   Head:  Normocephalic  Eyes:  PERRL, conjunctiva/corneas clear, EOM's intact  ENT:  membranes are moist without pallor  Neck:  Supple  Cardio:  Regular rate and rhythm  Pulm:  No respiratory distress  Extremities: Moves all extremities normally, normal gait  Skin:  Skin warm, dry, no rashes  Neuro: Cranial nerve 3-12 intact. 5/5 strength upper and lower extremities strength.  Hints exam reassuring. Alert and oriented ×3, moving all extremities, no gross sensory defects     RADIOLOGY/LABS:  Reviewed all pertinent imaging. Please see official radiology report. All pertinent labs reviewed and interpreted.    Results for orders placed or performed during the hospital encounter of 09/13/23   Glucose by meter   Result Value Ref Range    GLUCOSE BY METER POCT 101 (H) 70 - 99 mg/dL       EKG:  Performed at: 13-EP-2023 11:35    Impression: Sinus rhythm with short NE    Rate: 93 BPM  Rhythm: Normal sinus  Axis: 53  NE Interval: 106 ms  QRS Interval: 78 ms  QTc Interval: 435 ms  ST Changes: None  Comparison: When compared with ECG of 28-JUL-2023 12:36, No significant change was found.   I have independently reviewed and interpreted the EKG(s) documented above.    The creation of this record is based on the scribe s observations of the work being performed by Yessenia Hirsch MD and the provider s statements to them. It was created on her behalf by Mallika Koenig, a trained medical scribe. This document has been checked and approved by the attending provider.    Yessenia Hirsch MD  Emergency Medicine  [facility] EMERGENCY ROOM  [address] [address]  [phone]  Dept: [phone]       Yessenia Hirsch MD  09/13/23 5272

## 2023-09-13 NOTE — ED TRIAGE NOTES
Pt arrives to ED with c/o room spinning dizziness while making breakfast this morning around 930 am. Pt states also some balance issues and her depth perception was off. Pt states feelings has resolved except for room spinning and lightheadedness when she stands up. No hx of vertigo. Hx of migraine denies its a migraine or auro. Nausea no vomiting. Also endorses to chills.        Triage Assessment       Row Name 09/13/23 1129       Triage Assessment (Adult)    Airway WDL WDL       Respiratory WDL    Respiratory WDL WDL       Skin Circulation/Temperature WDL    Skin Circulation/Temperature WDL WDL       Cardiac WDL    Cardiac WDL WDL       Peripheral/Neurovascular WDL    Peripheral Neurovascular WDL WDL       Cognitive/Neuro/Behavioral WDL    Cognitive/Neuro/Behavioral WDL WDL

## 2023-09-15 ENCOUNTER — ANCILLARY PROCEDURE (OUTPATIENT)
Dept: GENERAL RADIOLOGY | Facility: CLINIC | Age: 28
End: 2023-09-15
Attending: NURSE PRACTITIONER
Payer: COMMERCIAL

## 2023-09-15 ENCOUNTER — OFFICE VISIT (OUTPATIENT)
Dept: FAMILY MEDICINE | Facility: CLINIC | Age: 28
End: 2023-09-15
Payer: COMMERCIAL

## 2023-09-15 VITALS
HEART RATE: 87 BPM | SYSTOLIC BLOOD PRESSURE: 118 MMHG | DIASTOLIC BLOOD PRESSURE: 76 MMHG | HEIGHT: 62 IN | TEMPERATURE: 99.1 F | BODY MASS INDEX: 29.72 KG/M2 | OXYGEN SATURATION: 97 % | WEIGHT: 161.5 LBS

## 2023-09-15 DIAGNOSIS — M53.3 PAIN IN THE COCCYX: ICD-10-CM

## 2023-09-15 DIAGNOSIS — M53.3 PAIN IN THE COCCYX: Primary | ICD-10-CM

## 2023-09-15 PROCEDURE — 99214 OFFICE O/P EST MOD 30 MIN: CPT | Performed by: NURSE PRACTITIONER

## 2023-09-15 PROCEDURE — 72220 X-RAY EXAM SACRUM TAILBONE: CPT | Mod: TC | Performed by: RADIOLOGY

## 2023-09-15 ASSESSMENT — ENCOUNTER SYMPTOMS: BACK PAIN: 1

## 2023-09-15 NOTE — PROGRESS NOTES
"  Assessment & Plan     (M53.3) Pain in the coccyx  (primary encounter diagnosis)  Comment: X-ray with no obvious evidence of fracture, will treat as inflammatory and advised ibuprofen and ice.  X-ray also suggests constipation and she is going to  some MiraLAX and do a cleanout this weekend.  If symptoms do not rate resolve she should return  Plan: XR Sacrum and Coccyx 2 Views                         Adelia Grier NP  [facility]    Isaac Del Angel is a 28 year old, presenting for the following health issues: pain down the center of back and radiates down towards anus, thought maybe she had a hemorrhoid, pain in lower back, was diagnoses with BV - wondering if this could be causing symptoms, has not started the medication, urine was negative for UTI    This pain started 3 weeks ago, seemed to resolve then returning. Hurts to lie to down on back and sitting  Sits at work at computer  No meds used, advised NSAID and ice  Back Pain and Vaginal Problem        9/15/2023     2:16 PM   Additional Questions   Roomed by yissel carter   Accompanied by self     Back Pain             Review of Systems   Musculoskeletal:  Positive for back pain.            Objective    /76 (BP Location: Right arm, Patient Position: Sitting)   Pulse 87   Temp 99.1  F (37.3  C)   Ht 1.575 m (5' 2\")   Wt 73.3 kg (161 lb 8 oz)   LMP 08/23/2023 (Approximate)   SpO2 97%   BMI 29.54 kg/m    Body mass index is 29.54 kg/m .  Physical Exam     She did allow exam to make sure we were not dealing with a pilonidal cyst.  There is no cyst or discharge at the top of the gluteal cleft posteriorly.  There is no erythema.  There is no bruise.  She is mildly tender with palpation of the coccyx and the sacrum but really no point specific tenderness                        "

## 2023-10-03 ENCOUNTER — MYC REFILL (OUTPATIENT)
Dept: INTERNAL MEDICINE | Facility: CLINIC | Age: 28
End: 2023-10-03
Payer: COMMERCIAL

## 2023-10-03 DIAGNOSIS — R06.02 SOB (SHORTNESS OF BREATH): Primary | ICD-10-CM

## 2023-10-04 RX ORDER — ALBUTEROL SULFATE 90 UG/1
2 AEROSOL, METERED RESPIRATORY (INHALATION) EVERY 6 HOURS PRN
Qty: 18 G | Refills: 3 | Status: SHIPPED | OUTPATIENT
Start: 2023-10-04

## 2023-10-12 ENCOUNTER — OFFICE VISIT (OUTPATIENT)
Dept: FAMILY MEDICINE | Facility: CLINIC | Age: 28
End: 2023-10-12
Payer: COMMERCIAL

## 2023-10-12 VITALS
DIASTOLIC BLOOD PRESSURE: 84 MMHG | TEMPERATURE: 98.2 F | HEART RATE: 76 BPM | SYSTOLIC BLOOD PRESSURE: 124 MMHG | OXYGEN SATURATION: 98 % | HEIGHT: 62 IN | WEIGHT: 165.4 LBS | BODY MASS INDEX: 30.44 KG/M2 | RESPIRATION RATE: 20 BRPM

## 2023-10-12 DIAGNOSIS — D22.5 MELANOCYTIC NEVUS OF TRUNK: Primary | ICD-10-CM

## 2023-10-12 PROCEDURE — 99213 OFFICE O/P EST LOW 20 MIN: CPT | Performed by: FAMILY MEDICINE

## 2023-10-12 NOTE — PROGRESS NOTES
"  Assessment & Plan     Melanocytic nevus of trunk  Reassurance of benign lesions.  She will continue to monitor for any change.  Observe subcutaneous mass, most likely lipoma                 Asa Canchola MD  [facility]    Isaac Del Angel is a 28 year old, presenting for the following health issues:  Musculoskeletal Problem (Check lump on right upper leg, has been ongoing, becoming more prominent) and Derm Problem (Check moles on right thigh and abdomen)      10/12/2023     5:39 PM   Additional Questions   Roomed by Marii CHRISTINE CMA   Accompanied by Self       Musculoskeletal Problem    History of Present Illness       Reason for visit:  Lump on leg and weird moles  Symptoms include:  NA  Symptom intensity:  Mild  Symptom progression:  Staying the same  Had these symptoms before:  No    She eats 2-3 servings of fruits and vegetables daily.She consumes 1 sweetened beverage(s) daily.She exercises with enough effort to increase her heart rate 30 to 60 minutes per day.  She exercises with enough effort to increase her heart rate 4 days per week.   She is taking medications regularly.     No history of skin cancer in family or in self  Lesion on leg have been present for the last year  The abs              Review of Systems   Constitutional, HEENT, cardiovascular, pulmonary, gi and gu systems are negative, except as otherwise noted.      Objective    /84 (BP Location: Right arm, Patient Position: Sitting, Cuff Size: Adult Large)   Pulse 76   Temp 98.2  F (36.8  C) (Tympanic)   Resp 20   Ht 1.575 m (5' 2\")   Wt 75 kg (165 lb 6.4 oz)   LMP 08/23/2023 (Approximate)   SpO2 98%   BMI 30.25 kg/m    Body mass index is 30.25 kg/m .  Physical Exam   Alert, oriented, no acute distress  Normal heart rate  Nonlabored breathing  Skin exam reveals a melanocytic lesion on the right mid abdomen.  It has a normal globular pattern with slight irregularity.  No melanoma specific signs on " dermoscopy.  There is a similar lesion on the right leg.  Small, slightly firm, subcutaneous mass right anterior thigh

## 2023-10-15 ENCOUNTER — NURSE TRIAGE (OUTPATIENT)
Dept: NURSING | Facility: CLINIC | Age: 28
End: 2023-10-15
Payer: COMMERCIAL

## 2023-10-15 LAB
ATRIAL RATE - MUSE: 77 BPM
DIASTOLIC BLOOD PRESSURE - MUSE: 110 MMHG
INTERPRETATION ECG - MUSE: NORMAL
P AXIS - MUSE: 50 DEGREES
PR INTERVAL - MUSE: 110 MS
QRS DURATION - MUSE: 78 MS
QT - MUSE: 386 MS
QTC - MUSE: 436 MS
R AXIS - MUSE: 52 DEGREES
SYSTOLIC BLOOD PRESSURE - MUSE: 177 MMHG
T AXIS - MUSE: 45 DEGREES
VENTRICULAR RATE- MUSE: 77 BPM

## 2023-10-15 PROCEDURE — 93005 ELECTROCARDIOGRAM TRACING: CPT | Performed by: EMERGENCY MEDICINE

## 2023-10-15 PROCEDURE — 99284 EMERGENCY DEPT VISIT MOD MDM: CPT

## 2023-10-16 ENCOUNTER — HOSPITAL ENCOUNTER (EMERGENCY)
Facility: CLINIC | Age: 28
Discharge: HOME | End: 2023-10-16
Attending: EMERGENCY MEDICINE | Admitting: EMERGENCY MEDICINE
Payer: COMMERCIAL

## 2023-10-16 VITALS
BODY MASS INDEX: 29.26 KG/M2 | WEIGHT: 160 LBS | DIASTOLIC BLOOD PRESSURE: 82 MMHG | RESPIRATION RATE: 21 BRPM | TEMPERATURE: 98.5 F | SYSTOLIC BLOOD PRESSURE: 117 MMHG | HEART RATE: 76 BPM | OXYGEN SATURATION: 100 %

## 2023-10-16 DIAGNOSIS — R00.2 PALPITATIONS: ICD-10-CM

## 2023-10-16 DIAGNOSIS — R07.9 CHEST PAIN, UNSPECIFIED TYPE: ICD-10-CM

## 2023-10-16 LAB
ALBUMIN SERPL BCG-MCNC: 4.3 G/DL (ref 3.5–5.2)
ALP SERPL-CCNC: 60 U/L (ref 35–104)
ALT SERPL W P-5'-P-CCNC: 11 U/L (ref 0–50)
ANION GAP SERPL CALCULATED.3IONS-SCNC: 12 MMOL/L (ref 7–15)
AST SERPL W P-5'-P-CCNC: 18 U/L (ref 0–45)
BILIRUB SERPL-MCNC: 0.5 MG/DL
BUN SERPL-MCNC: 13.6 MG/DL (ref 6–20)
CALCIUM SERPL-MCNC: 9.1 MG/DL (ref 8.6–10)
CHLORIDE SERPL-SCNC: 105 MMOL/L (ref 98–107)
CREAT SERPL-MCNC: 0.76 MG/DL (ref 0.51–0.95)
DEPRECATED HCO3 PLAS-SCNC: 23 MMOL/L (ref 22–29)
EGFRCR SERPLBLD CKD-EPI 2021: >90 ML/MIN/1.73M2
ERYTHROCYTE [DISTWIDTH] IN BLOOD BY AUTOMATED COUNT: 14.3 % (ref 10–15)
GLUCOSE SERPL-MCNC: 97 MG/DL (ref 70–99)
HCT VFR BLD AUTO: 37 % (ref 35–47)
HGB BLD-MCNC: 12.3 G/DL (ref 11.7–15.7)
MAGNESIUM SERPL-MCNC: 2.8 MG/DL (ref 1.7–2.3)
MCH RBC QN AUTO: 27.2 PG (ref 26.5–33)
MCHC RBC AUTO-ENTMCNC: 33.2 G/DL (ref 31.5–36.5)
MCV RBC AUTO: 82 FL (ref 78–100)
PLATELET # BLD AUTO: 269 10E3/UL (ref 150–450)
POTASSIUM SERPL-SCNC: 3.6 MMOL/L (ref 3.4–5.3)
PROT SERPL-MCNC: 7.1 G/DL (ref 6.4–8.3)
RBC # BLD AUTO: 4.52 10E6/UL (ref 3.8–5.2)
SODIUM SERPL-SCNC: 140 MMOL/L (ref 135–145)
TROPONIN T SERPL HS-MCNC: <6 NG/L
TSH SERPL DL<=0.005 MIU/L-ACNC: 2.94 UIU/ML (ref 0.3–4.2)
WBC # BLD AUTO: 8.3 10E3/UL (ref 4–11)

## 2023-10-16 PROCEDURE — 85027 COMPLETE CBC AUTOMATED: CPT | Performed by: EMERGENCY MEDICINE

## 2023-10-16 PROCEDURE — 84484 ASSAY OF TROPONIN QUANT: CPT | Performed by: EMERGENCY MEDICINE

## 2023-10-16 PROCEDURE — 80053 COMPREHEN METABOLIC PANEL: CPT | Performed by: EMERGENCY MEDICINE

## 2023-10-16 PROCEDURE — 36415 COLL VENOUS BLD VENIPUNCTURE: CPT | Performed by: EMERGENCY MEDICINE

## 2023-10-16 PROCEDURE — 84443 ASSAY THYROID STIM HORMONE: CPT | Performed by: EMERGENCY MEDICINE

## 2023-10-16 PROCEDURE — 83735 ASSAY OF MAGNESIUM: CPT | Performed by: EMERGENCY MEDICINE

## 2023-10-16 ASSESSMENT — ACTIVITIES OF DAILY LIVING (ADL): ADLS_ACUITY_SCORE: 35

## 2023-10-16 NOTE — ED PROVIDER NOTES
"EMERGENCY DEPARTMENT ENCOUNTER      NAME: Mer Gonzalez  YOB: 1995  MRN: [id number]    FINAL IMPRESSION  1. Palpitations    2. Chest pain, unspecified type        MEDICAL DECISION MAKING   Pertinent Labs & Imaging studies reviewed. (See chart for details)    Mer Gonzalez is a 28 year old female who presents for evaluation of chest pain and palpitations. Patient reports history of palpitations and saw cardiologist in the past, wore a Holter monitor and was diagnosed with a \"extra beat.\"  She reports that she occasionally experiences palpitations that last up to a couple of hours and resolved without interventions.  She presents today with complaints of more persistent symptoms over the last 48 hours.  She reports that she has also been experiencing more of a \"heavy\" heartbeat as if her heart is doing \"cart wheels.\"  Additionally, she reports associated \"pinching\" pain in her left upper chest and nausea.  She states that over the last 2 days, this pain has been occurring every few minutes.  She does not have any associated cough, fever, abdominal pain, vomiting, leg swelling or leg pain.  She has not had any recent travel or sick contacts.  No change in diet, medications, or caffeine consumption.  She describes herself as generally healthy.  Vitals on arrival notable for elevated blood pressure but otherwise stable and reassuring.    Records reviewed.  Patient has a history of hypertension, premature atrial contractions.  On review of records, appears that she was seen by primary care provider on 7/28/2023 with complaints of palpitations.  She was given referral to cardiology and was seen in their clinic on 8/2/2023.  EKG at that time was reassuring but did reveal frequent PACs.  She had previously worn a Zio patch for 12 days which demonstrated the same.  Patient was also diagnosed with hypertension and was noted to have high cholesterol.  She was reassured of benign nature of PACs and there was " some note of possibly changing amlodipine to diltiazem or beta-blocker if she had recurrence or new symptoms.    I considered a broad differential including but not limited to ACS/ischemia, unstable angina, accelerated hypertension, pericarditis, myocarditis, costochondritis, pleurisy, arrhythmia/paroxysmal arrhythmia, thyroid disorder, electrolyte derangement, anemia, anxiety.  Low suspicion for AAA/dissection.  Also have low suspicion for PE and patient is PERC negative so I do not believe further work-up of this is necessary.  Patient does not have any cough, fever, wheezing, or other signs/symptoms to suggest lower respiratory tract infection, asthma, or other pulmonary process.  She is low risk for MACE by heart score.  Discussed options for work-up and management with patient.  We have agreed on plan for EKG and basic labs.  Patient declined offer for analgesic/antiemetic.  We will keep on cardiac monitor.    ED Course as of 10/16/23 0250   Mon Oct 16, 2023   0107 Comprehensive metabolic panel  CMP reassuring. No evidence of RITA, acidosis, or significant electrolyte derangement. No acute elevation of bilirubin or transaminates to suggest acute hepatobiliary process.   0107 Troponin T, High Sensitivity: <6  High sensitivity troponin below age adjusted cutoff. ACS less likely in the setting of ECG without acute ischemic changes and I do not feel delta troponin necessary given timing of symptoms.     0107 TSH: 2.94  Wnl, unlikely thyroid disorder.   0107 CBC (+ platelets, no diff)  CBC reassuring. No evidence of leukocytosis to suggest systemic infectious/inflammatory process. No acute anemia. PLTs wnl.   0250 Magnesium(!): 2.8  Slightly elevated but unlikely to explain symptoms.      EKG revealed sinus rhythm with short VA but no clear delta wave as well as premature supraventricular complexes but no evidence of WPW, Brugada, HOCM, ischemia, or significant change from previous.    Work-up today was very  reassuring as above.  I rechecked the patient and reviewed results.  She felt reassured and remained hemodynamically stable.  Blood pressure was significantly elevated on arrival but resolved to within normal limits without any interventions.  We reviewed potential etiology of symptoms as well as options for further work-up and management.  I did offer a chest x-ray but we have agreed that this is unlikely to offer any additional/useful information.  I do believe patient may warrant repeat Holter/Zio patch and evaluation by cardiology given her symptoms have somewhat changed since she was most recently seen.  Patient felt comfortable with this plan for disposition.  I did explain that she can call the cardiologist that she saw before otherwise, can send with referral to the rapid access clinic.  She was interested in the latter.  In the meantime, I recommended continued conservative management of symptoms and that she take all of her medications as prescribed and avoid excessive caffeine consumption.    Strict return precautions and follow up recommendations were discussed and all questions were answered. Patient endorsed understanding and was in agreement with plan.        Medical Decision Making    History:  Supplemental history from: Documented in chart, if applicable  External Record(s) reviewed: Cardiology outpatient visit 8/2/23, primary clinic visit 7/28/23, cardiac testing    Work Up:  Chart documentation includes differential considered and any EKGs or imaging independently interpreted by provider, where specified.  In additional to work up documented, I considered the following work up: Documented in chart, if applicable.    External consultation:  Discussion of management with another provider: Documented in chart, if applicable    Complicating factors:  Care impacted by chronic illness: Hyperlipidemia, Hypertension, and Other: PACs  Care affected by social determinants of health: Access to Medical  "Care    Disposition considerations: Discharge. I recommended the patient continue their current prescription strength medication(s): antihypertensives. N/A.          ED COURSE  11:12 PM I met the patient and performed my initial interview and exam.   1:07 AM I rechecked and updated the patient. I spoke about plan for discharge.       MEDICATIONS GIVEN IN THE ED  Medications - No data to display    NEW PRESCRIPTIONS STARTED AT TODAY'S VISIT  Discharge Medication List as of 10/16/2023  1:21 AM             =================================================================    Chief Complaint   Patient presents with    Chest Pain         HPI:    Patient information was obtained from: Patient    Use of : N/A     Mer Gonzalez is a 28 year old female with a history of hypertension, premature atrial contraction and palpitations who presents for chest pain.     Patient reports that she has a history of having an \"extra beat\" in her heart that will last a couple hours and go away. Over the past 48 hours she has had this which has never happened for this long before and this evening it began to feel like her heart was doing \"cart wheels\" that feels heavy. Does have a left pinching pain in her chest also along with nausea. Her pain comes every few minutes. No new diet change or new medications. No family cardiac history. Denies any cough, abdominal pain, leg swelling, or any other complaints at this time.       RELEVANT HISTORY, MEDICATIONS, & ALLERGIES   Past medical history, surgical history, family history, medications, and allergies reviewed and pertinent noted in HPI.    REVIEW OF SYSTEMS:  A complete review of systems was performed with pertinent positives and negatives noted in the HPI. All other systems negative.     PHYSICAL EXAM:    Vitals: /82   Pulse 76   Temp 98.5  F (36.9  C) (Oral)   Resp 21   Wt 72.6 kg (160 lb)   LMP 09/18/2023 (Approximate)   SpO2 100%   BMI 29.26 kg/m     General: " Alert and interactive, comfortable appearing.  HENT: Atraumatic. Full AROM of neck. Conjunctiva clear.   Cardiovascular: Regular rate and rhythm.   Chest/Pulmonary: Normal work of breathing. Speaking in complete sentences. Lungs CTAB. No chest wall tenderness or deformities.  Abdomen: Soft, nondistended. Nontender without guarding or rebound.  Extremities: Normal AROM of all major joints.  Skin: Warm and dry. Normal skin color.   Neuro: Speech clear. CNs grossly intact. Moves all extremities spontaneously.   Psych: Normal affect/mood, cooperative, memory appropriate.    LAB  Labs Ordered and Resulted from Time of ED Arrival to Time of ED Departure   MAGNESIUM - Abnormal       Result Value    Magnesium 2.8 (*)    TSH WITH FREE T4 REFLEX - Normal    TSH 2.94     COMPREHENSIVE METABOLIC PANEL - Normal    Sodium 140      Potassium 3.6      Carbon Dioxide (CO2) 23      Anion Gap 12      Urea Nitrogen 13.6      Creatinine 0.76      GFR Estimate >90      Calcium 9.1      Chloride 105      Glucose 97      Alkaline Phosphatase 60      AST 18      ALT 11      Protein Total 7.1      Albumin 4.3      Bilirubin Total 0.5     CBC WITH PLATELETS - Normal    WBC Count 8.3      RBC Count 4.52      Hemoglobin 12.3      Hematocrit 37.0      MCV 82      MCH 27.2      MCHC 33.2      RDW 14.3      Platelet Count 269     TROPONIN T, HIGH SENSITIVITY - Normal    Troponin T, High Sensitivity <6         RADIOLOGY  No orders to display       EKG  Performed at: 2250  Impression: Sinus rhythm with short NV with premature supraventricular complexes.  No clear delta wave.  No acute ischemic changes.  Compared to previous, premature supraventricular complexes present and no PACs.  Rate: 77  Rhythm: Sinus  QRS Interval: 78  QTc Interval: 436  Comparison: 9/13/2023    All laboratory and imaging results and EKG's were personally reviewed and interpreted by myself prior to disposition decision.       Darwin POLLARD, am serving as a scribe to document  services personally performed by Dr. Rossana Patel based on my observation and the provider's statements to me. I, Rossana Patel MD attest that Darwin Ino is acting in a scribe capacity, has observed my performance of the services and has documented them in accordance with my direction.    Rossana Patel M.D.  Emergency Medicine  [facility] EMERGENCY ROOM  [address] [address]  [phone]  Dept: [phone]     Rossana Patel MD  10/16/23 0255

## 2023-10-16 NOTE — ED TRIAGE NOTES
Pt here with palpitations and chest pain. Describes cp as a throbbing pain and describes as intermittent pain. States some sob. Denies back pain. Denies body aches. Some nausea. Denies light headedness nor dizziness. Has had palpittaions since Friday night.

## 2023-10-16 NOTE — DISCHARGE INSTRUCTIONS
You were seen in the emergency department for chest pain and palpitations.     Please return to the Emergency Department immediately if you experience chest pain, difficulty breathing, and/or if your symptoms get worse, do not improve, or with any new concerns. Otherwise, please follow up with your primary physician within the next 2-3 days for recheck and ongoing management.    I am sending you with a referral to see cardiology through the rapid access clinic. Someone should be calling you to arrange a follow up appointment.     Below is some information that you might find informative and useful.     Thank you for choosing [facility]. It was a pleasure taking care of you today!  -Dr. Rossana Patel

## 2023-10-16 NOTE — TELEPHONE ENCOUNTER
"Patient complains of chest pain and palpitation, recommend ED.    Patient agrees and en route.               Reason for Disposition   [1] Skipped or extra beat(s) AND [2] occurs 4 or more times per minute   [1] Chest pain (or \"angina\") comes and goes AND [2] is happening more often (increasing in frequency) or getting worse (increasing in severity)  (Exception: Chest pains that last only a few seconds.)    Additional Information   Negative: Passed out (i.e., lost consciousness, collapsed and was not responding)   Negative: Shock suspected (e.g., cold/pale/clammy skin, too weak to stand, low BP, rapid pulse)   Negative: Difficult to awaken or acting confused (e.g., disoriented, slurred speech)   Negative: Visible sweat on face or sweat dripping down face   Negative: Unable to walk, or can only walk with assistance (e.g., requires support)   Negative: [1] Received SHOCK from implantable cardiac defibrillator AND [2] persisting symptoms (i.e., palpitations, lightheadedness)   Negative: [1] Dizziness, lightheadedness, or weakness AND [2] heart beating very rapidly (e.g., > 140 / minute)   Negative: [1] Dizziness, lightheadedness, or weakness AND [2] heart beating very slowly (e.g., < 50 / minute)   Negative: Sounds like a life-threatening emergency to the triager   Negative: Difficulty breathing   Negative: Dizziness, lightheadedness, or weakness   Negative: [1] Heart beating very rapidly (e.g., > 140 / minute) AND [2] present now  (Exception: During exercise.)   Negative: Heart beating very slowly (e.g., < 50 / minute)  (Exception: Athlete and heart rate normal for caller.)   Negative: [1] Heart beating very rapidly (e.g., > 140 / minute) AND [2] not present now  (Exception: During exercise.)   Negative: [1] Skipped or extra beat(s) AND [2] increases with exercise or exertion   Negative: SEVERE chest pain    Protocols used: Heart Rate and Heartbeat [id number], Chest Pain-A-AH    "

## 2023-10-19 ENCOUNTER — MYC MEDICAL ADVICE (OUTPATIENT)
Dept: INTERNAL MEDICINE | Facility: CLINIC | Age: 28
End: 2023-10-19
Payer: COMMERCIAL

## 2023-10-26 ENCOUNTER — OFFICE VISIT (OUTPATIENT)
Dept: FAMILY MEDICINE | Facility: CLINIC | Age: 28
End: 2023-10-26
Payer: COMMERCIAL

## 2023-10-26 VITALS
SYSTOLIC BLOOD PRESSURE: 116 MMHG | HEIGHT: 62 IN | OXYGEN SATURATION: 97 % | WEIGHT: 162.8 LBS | DIASTOLIC BLOOD PRESSURE: 76 MMHG | BODY MASS INDEX: 29.96 KG/M2 | TEMPERATURE: 98.9 F | HEART RATE: 84 BPM

## 2023-10-26 DIAGNOSIS — J02.9 SORE THROAT: Primary | ICD-10-CM

## 2023-10-26 LAB
DEPRECATED S PYO AG THROAT QL EIA: NEGATIVE
GROUP A STREP BY PCR: NOT DETECTED

## 2023-10-26 PROCEDURE — 87651 STREP A DNA AMP PROBE: CPT | Performed by: NURSE PRACTITIONER

## 2023-10-26 PROCEDURE — 99213 OFFICE O/P EST LOW 20 MIN: CPT | Performed by: NURSE PRACTITIONER

## 2023-10-26 ASSESSMENT — ENCOUNTER SYMPTOMS: SORE THROAT: 1

## 2023-10-26 NOTE — PROGRESS NOTES
Assessment & Plan     (J02.9) Sore throat  (primary encounter diagnosis)  Comment:   Plan: likely viral                 Adelia Grier NP  [facility]    Isaac Del Angel is a 28 year old, presenting for the following health issues:sore throat, swollen glands along both sides of the neck, and bodyaches, denies cough and fever    Started about 48 hours ago.  Hx of tonsillectomy  No contacts to strep but around young nieces  Keeping fluids down    Pharyngitis and Musculoskeletal Problem        10/26/2023    11:41 AM   Additional Questions   Roomed by yissel carter   Accompanied by self         Pharyngitis     Musculoskeletal Problem  Associated symptoms include a sore throat.   History of Present Illness       Reason for visit:  Get tested for strep or covid  Symptom onset:  1-3 days ago  Symptoms include:  Sore throat and body aches  Symptom intensity:  Mild  Symptom progression:  Worsening  Had these symptoms before:  Yes  Has tried/received treatment for these symptoms:  No  What makes it worse:  Nothing  What makes it better:  Drinking fluids    She eats 2-3 servings of fruits and vegetables daily.She consumes 1 sweetened beverage(s) daily.She exercises with enough effort to increase her heart rate 30 to 60 minutes per day.  She exercises with enough effort to increase her heart rate 3 or less days per week.   She is taking medications regularly.       Pre-Provider Visit Orders - Rapid Strep  Does the patient have shortness of breath/trouble breathing, an earache, drooling/too much saliva, or any difficulty opening her mouth/moving neck?  No  Does the patient have a sore throat and either history of fever >100.4 in the previous 24 hours without a cough or recent exposure to a known case of strep throat? Yes   Hypertension Follow-up    Do you check your blood pressure regularly outside of the clinic? Yes   Are you following a low salt diet? Yes  Are your blood pressures ever more than 140  "on the top number (systolic) OR more   than 90 on the bottom number (diastolic), for example 140/90? No        Review of Systems   HENT:  Positive for sore throat.             Objective    /76 (BP Location: Right arm, Patient Position: Sitting)   Pulse 84   Temp 98.9  F (37.2  C)   Ht 1.575 m (5' 2\")   Wt 73.8 kg (162 lb 12.8 oz)   LMP 10/16/2023 (Exact Date)   SpO2 97%   Breastfeeding No   BMI 29.78 kg/m    Body mass index is 29.78 kg/m .  Physical Exam                         "

## 2023-10-27 ENCOUNTER — MYC MEDICAL ADVICE (OUTPATIENT)
Dept: CARDIOLOGY | Facility: CLINIC | Age: 28
End: 2023-10-27

## 2023-10-27 ENCOUNTER — OFFICE VISIT (OUTPATIENT)
Dept: CARDIOLOGY | Facility: CLINIC | Age: 28
End: 2023-10-27
Attending: EMERGENCY MEDICINE
Payer: COMMERCIAL

## 2023-10-27 VITALS
BODY MASS INDEX: 30.27 KG/M2 | RESPIRATION RATE: 16 BRPM | HEART RATE: 70 BPM | SYSTOLIC BLOOD PRESSURE: 106 MMHG | HEIGHT: 62 IN | WEIGHT: 164.5 LBS | DIASTOLIC BLOOD PRESSURE: 72 MMHG | OXYGEN SATURATION: 99 %

## 2023-10-27 DIAGNOSIS — I10 ESSENTIAL HYPERTENSION: ICD-10-CM

## 2023-10-27 DIAGNOSIS — I49.1 PAC (PREMATURE ATRIAL CONTRACTION): Primary | ICD-10-CM

## 2023-10-27 DIAGNOSIS — R00.2 PALPITATIONS: ICD-10-CM

## 2023-10-27 DIAGNOSIS — R07.9 CHEST PAIN, UNSPECIFIED TYPE: ICD-10-CM

## 2023-10-27 DIAGNOSIS — R00.2 PALPITATIONS: Primary | ICD-10-CM

## 2023-10-27 PROCEDURE — 99204 OFFICE O/P NEW MOD 45 MIN: CPT | Performed by: INTERNAL MEDICINE

## 2023-10-27 NOTE — LETTER
10/27/2023    Lesly Emery, CNP  [address] [place] [address]    RE: Mer Gonzalez       Dear Colleague,     I had the pleasure of seeing Mer Gonzalez in the [facility].      [facility]  [phone]          Assessment/Recommendations   Patient with palpitations which seem to be more prominent now and happening every day.  She has had some more forceful palpitations but no syncopal or near syncopal episodes.  We will a 24-hour Holter monitor as this is happening every day and an echocardiogram to see if her heart is structurally normal.    She will call us with some blood pressures.  If her blood pressure remains in the range of today, we will cut back or discontinue her amlodipine and see if she can get away without any antihypertensive medications.    Thank you for allowing us to participate in her care.       History of Present Illness/Subjective    Ms. Mer Gonzalez is a 28 year old female with a history of palpitations and had a monitor put on in 2022 which showed some premature atrial contractions.  Her palpitations seem to to change in July of this year becoming more prominent and more of a fluttery feeling.  Sometimes she is even felt like there is a small spasm or a cart will going on and on 1 occasion she had a pinching sensation in her left upper chest with pinching moving around on and off for a few hours.  She did go to the emergency room at that time and a twelve-lead EKG did show evidence of a PAC but no other worrisome findings were noted during her ER visit.    She denies chest discomfort.  She denies orthopnea, paroxysmal nocturnal dyspnea, peripheral edema, syncope or near syncopal episodes.  She has no history of rheumatic fever, heart murmur, cerebrovascular accident or TIA.    She is not diabetic, does not smoke, has been treated for hypertension and does not have a family history of premature coronary artery disease.  She does not know  "her lipid panel and cannot find one in the chart in the last 5 years.    She works in [email or web address] for the [employer].  She works from home and does .  She is not , and does not have children.    ECG: Personally reviewed.  Sinus rhythm at 77 bpm and premature atrial contraction.  Mild T wave flattening noted.       Physical Examination Review of Systems   /72 (BP Location: Left arm, Patient Position: Sitting, Cuff Size: Adult Regular)   Pulse 70   Resp 16   Ht 1.575 m (5' 2\")   Wt 74.6 kg (164 lb 8 oz)   LMP 10/16/2023 (Exact Date)   SpO2 99%   BMI 30.09 kg/m    Body mass index is 30.09 kg/m .  Wt Readings from Last 3 Encounters:   10/27/23 74.6 kg (164 lb 8 oz)   10/26/23 73.8 kg (162 lb 12.8 oz)   10/15/23 72.6 kg (160 lb)     General Appearance:   Alert, cooperative and in no acute distress.   ENT/Mouth: Pink/moist oral mucosa   EYES:  no scleral icterus, normal conjunctivae   Neck: JVP normal. No Hepatojugular reflux. Thyroid not visualized.   Chest/Lungs:   Lungs are clear to auscultation, equal chest wall expansion.   Cardiovascular:   S1, S2 without murmur ,clicks or rubs. Brachial, radial and posterior tibial pulses are intact and symetric. No carotid bruits noted   Abdomen:  Nontender. BS+.   Extremities: No cyanosis, clubbing or edema   Skin: no xanthelasma, warm.    Neurologic: normal arm movement bilateral, no tremors     Psychiatric: Appropriate affect.      Enc Vitals  BP: 106/72  Pulse: 70  Resp: 16  SpO2: 99 %  Weight: 74.6 kg (164 lb 8 oz)  Height: 157.5 cm (5' 2\")                                           Medical History  Surgical History Family History Social History   Past Medical History:   Diagnosis Date    Hypertension     Past Surgical History:   Procedure Laterality Date    DILATION AND CURETTAGE  11/2022    [facility]roids [facility] [place] [facility]    TONSILLECTOMY      WISDOM TOOTH EXTRACTION  01/01/2011    Family History   Problem Relation Age of Onset    Kidney " Cancer Father         age 48    Allergic rhinitis Sister     Social History     Socioeconomic History    Marital status: Single     Spouse name: Not on file    Number of children: Not on file    Years of education: Not on file    Highest education level: Not on file   Occupational History    Not on file   Tobacco Use    Smoking status: Never     Passive exposure: Never    Smokeless tobacco: Never   Vaping Use    Vaping Use: Never used   Substance and Sexual Activity    Alcohol use: Not Currently    Drug use: Never    Sexual activity: Yes     Partners: Male     Birth control/protection: Condom   Other Topics Concern    Not on file   Social History Narrative    Not on file     Social Determinants of Health     Financial Resource Strain: Low Risk  (10/12/2023)    Financial Resource Strain     Within the past 12 months, have you or your family members you live with been unable to get utilities (heat, electricity) when it was really needed?: No   Food Insecurity: Low Risk  (10/12/2023)    Food Insecurity     Within the past 12 months, did you worry that your food would run out before you got money to buy more?: No     Within the past 12 months, did the food you bought just not last and you didn t have money to get more?: No   Transportation Needs: Low Risk  (10/12/2023)    Transportation Needs     Within the past 12 months, has lack of transportation kept you from medical appointments, getting your medicines, non-medical meetings or appointments, work, or from getting things that you need?: No   Physical Activity: Not on file   Stress: Not on file   Social Connections: Not on file   Interpersonal Safety: Not At Risk (10/28/2022)    Humiliation, Afraid, Rape, and Kick questionnaire     Fear of Current or Ex-Partner: No     Emotionally Abused: No     Physically Abused: No     Sexually Abused: No   Housing Stability: Low Risk  (10/12/2023)    Housing Stability     Do you have housing? : Yes     Are you worried about losing  your housing?: No          Medications  Allergies   Current Outpatient Medications   Medication Sig Dispense Refill    albuterol (PROAIR HFA/PROVENTIL HFA/VENTOLIN HFA) 108 (90 Base) MCG/ACT inhaler Inhale 2 puffs into the lungs every 6 hours as needed for shortness of breath, wheezing or cough 18 g 3    amLODIPine (NORVASC) 5 MG tablet Take 1 tablet (5 mg) by mouth daily 90 tablet 2    Allergies   Allergen Reactions    Amoxicillin Hives    Cephalexin      Other reaction(s): brain fog, emotional    Sulfamethoxazole-Trimethoprim [Sulfamethoxazole-Trimethoprim] Unknown    Latex Hives, Itching, Rash and Swelling         Lab Results    Chemistry/lipid CBC Cardiac Enzymes/BNP/TSH/INR   Lab Results   Component Value Date    CHOL 210 (H) 05/18/2023    HDL 58 05/18/2023    TRIG 67 05/18/2023    BUN 13.6 10/16/2023     10/16/2023    CO2 23 10/16/2023    Lab Results   Component Value Date    WBC 8.3 10/16/2023    HGB 12.3 10/16/2023    HCT 37.0 10/16/2023    MCV 82 10/16/2023     10/16/2023    Lab Results   Component Value Date    TROPONINI <0.01 09/15/2022    TSH 2.94 10/16/2023            Thank you for allowing me to participate in the care of your patient.      Sincerely,     Guillermo Stein MD     [facility]  cc:   Rossana Patel MD  [address] [place] [address]

## 2023-10-27 NOTE — PROGRESS NOTES
[facility]  [phone]          Assessment/Recommendations   Patient with palpitations which seem to be more prominent now and happening every day.  She has had some more forceful palpitations but no syncopal or near syncopal episodes.  We will a 24-hour Holter monitor as this is happening every day and an echocardiogram to see if her heart is structurally normal.    She will call us with some blood pressures.  If her blood pressure remains in the range of today, we will cut back or discontinue her amlodipine and see if she can get away without any antihypertensive medications.    Thank you for allowing us to participate in her care.       History of Present Illness/Subjective    Ms. Mer Gonzalez is a 28 year old female with a history of palpitations and had a monitor put on in 2022 which showed some premature atrial contractions.  Her palpitations seem to to change in July of this year becoming more prominent and more of a fluttery feeling.  Sometimes she is even felt like there is a small spasm or a cart will going on and on 1 occasion she had a pinching sensation in her left upper chest with pinching moving around on and off for a few hours.  She did go to the emergency room at that time and a twelve-lead EKG did show evidence of a PAC but no other worrisome findings were noted during her ER visit.    She denies chest discomfort.  She denies orthopnea, paroxysmal nocturnal dyspnea, peripheral edema, syncope or near syncopal episodes.  She has no history of rheumatic fever, heart murmur, cerebrovascular accident or TIA.    She is not diabetic, does not smoke, has been treated for hypertension and does not have a family history of premature coronary artery disease.  She does not know her lipid panel and cannot find one in the chart in the last 5 years.    She works in [employer] for the [employer].  She works from home and does .  She is not , and does not have  "children.    ECG: Personally reviewed.  Sinus rhythm at 77 bpm and premature atrial contraction.  Mild T wave flattening noted.       Physical Examination Review of Systems   /72 (BP Location: Left arm, Patient Position: Sitting, Cuff Size: Adult Regular)   Pulse 70   Resp 16   Ht 1.575 m (5' 2\")   Wt 74.6 kg (164 lb 8 oz)   LMP 10/16/2023 (Exact Date)   SpO2 99%   BMI 30.09 kg/m    Body mass index is 30.09 kg/m .  Wt Readings from Last 3 Encounters:   10/27/23 74.6 kg (164 lb 8 oz)   10/26/23 73.8 kg (162 lb 12.8 oz)   10/15/23 72.6 kg (160 lb)     General Appearance:   Alert, cooperative and in no acute distress.   ENT/Mouth: Pink/moist oral mucosa   EYES:  no scleral icterus, normal conjunctivae   Neck: JVP normal. No Hepatojugular reflux. Thyroid not visualized.   Chest/Lungs:   Lungs are clear to auscultation, equal chest wall expansion.   Cardiovascular:   S1, S2 without murmur ,clicks or rubs. Brachial, radial and posterior tibial pulses are intact and symetric. No carotid bruits noted   Abdomen:  Nontender. BS+.   Extremities: No cyanosis, clubbing or edema   Skin: no xanthelasma, warm.    Neurologic: normal arm movement bilateral, no tremors     Psychiatric: Appropriate affect.      Enc Vitals  BP: 106/72  Pulse: 70  Resp: 16  SpO2: 99 %  Weight: 74.6 kg (164 lb 8 oz)  Height: 157.5 cm (5' 2\")                                           Medical History  Surgical History Family History Social History   Past Medical History:   Diagnosis Date    Hypertension     Past Surgical History:   Procedure Laterality Date    DILATION AND CURETTAGE  11/2022    Done [facility]roids [facility] [place] [facility]    TONSILLECTOMY      WISDOM TOOTH EXTRACTION  01/01/2011    Family History   Problem Relation Age of Onset    Kidney Cancer Father         age 48    Allergic rhinitis Sister     Social History     Socioeconomic History    Marital status: Single     Spouse name: Not on file    Number of children: Not on file    " Years of education: Not on file    Highest education level: Not on file   Occupational History    Not on file   Tobacco Use    Smoking status: Never     Passive exposure: Never    Smokeless tobacco: Never   Vaping Use    Vaping Use: Never used   Substance and Sexual Activity    Alcohol use: Not Currently    Drug use: Never    Sexual activity: Yes     Partners: Male     Birth control/protection: Condom   Other Topics Concern    Not on file   Social History Narrative    Not on file     Social Determinants of Health     Financial Resource Strain: Low Risk  (10/12/2023)    Financial Resource Strain     Within the past 12 months, have you or your family members you live with been unable to get utilities (heat, electricity) when it was really needed?: No   Food Insecurity: Low Risk  (10/12/2023)    Food Insecurity     Within the past 12 months, did you worry that your food would run out before you got money to buy more?: No     Within the past 12 months, did the food you bought just not last and you didn t have money to get more?: No   Transportation Needs: Low Risk  (10/12/2023)    Transportation Needs     Within the past 12 months, has lack of transportation kept you from medical appointments, getting your medicines, non-medical meetings or appointments, work, or from getting things that you need?: No   Physical Activity: Not on file   Stress: Not on file   Social Connections: Not on file   Interpersonal Safety: Not At Risk (10/28/2022)    Humiliation, Afraid, Rape, and Kick questionnaire     Fear of Current or Ex-Partner: No     Emotionally Abused: No     Physically Abused: No     Sexually Abused: No   Housing Stability: Low Risk  (10/12/2023)    Housing Stability     Do you have housing? : Yes     Are you worried about losing your housing?: No          Medications  Allergies   Current Outpatient Medications   Medication Sig Dispense Refill    albuterol (PROAIR HFA/PROVENTIL HFA/VENTOLIN HFA) 108 (90 Base) MCG/ACT  inhaler Inhale 2 puffs into the lungs every 6 hours as needed for shortness of breath, wheezing or cough 18 g 3    amLODIPine (NORVASC) 5 MG tablet Take 1 tablet (5 mg) by mouth daily 90 tablet 2    Allergies   Allergen Reactions    Amoxicillin Hives    Cephalexin      Other reaction(s): brain fog, emotional    Sulfamethoxazole-Trimethoprim [Sulfamethoxazole-Trimethoprim] Unknown    Latex Hives, Itching, Rash and Swelling         Lab Results    Chemistry/lipid CBC Cardiac Enzymes/BNP/TSH/INR   Lab Results   Component Value Date    CHOL 210 (H) 05/18/2023    HDL 58 05/18/2023    TRIG 67 05/18/2023    BUN 13.6 10/16/2023     10/16/2023    CO2 23 10/16/2023    Lab Results   Component Value Date    WBC 8.3 10/16/2023    HGB 12.3 10/16/2023    HCT 37.0 10/16/2023    MCV 82 10/16/2023     10/16/2023    Lab Results   Component Value Date    TROPONINI <0.01 09/15/2022    TSH 2.94 10/16/2023

## 2023-11-02 NOTE — TELEPHONE ENCOUNTER
From: Guillermo Stein MD  Sent: 11/1/2023   8:00 PM CDT  To: Ashley Dillon  Subject: FW: Rheumatic fever update                       Yes  ----- Message -----  From: Ashley Carranza  Sent: 11/1/2023   9:21 AM CDT  To: Guillermo Stein MD  Subject: FW: Rheumatic fever update                       Dr. Stein,    Do you want to start with a 24 hour monitor? thanks  ----- Message -----  From: Guillermo Stein MD  Sent: 11/1/2023   7:16 AM CDT  To: Ashley Carranza  Subject: FW: Rheumatic fever update                       Ashley,    Will be good to get a Holter as ordered to see if we can catch these.    ThanksGuillermo

## 2023-11-10 ENCOUNTER — HOSPITAL ENCOUNTER (OUTPATIENT)
Dept: CARDIOLOGY | Facility: CLINIC | Age: 28
Discharge: HOME | End: 2023-11-10
Attending: INTERNAL MEDICINE
Payer: COMMERCIAL

## 2023-11-10 DIAGNOSIS — R00.2 PALPITATIONS: ICD-10-CM

## 2023-11-10 DIAGNOSIS — I10 ESSENTIAL HYPERTENSION: ICD-10-CM

## 2023-11-10 DIAGNOSIS — R07.9 CHEST PAIN, UNSPECIFIED TYPE: ICD-10-CM

## 2023-11-10 DIAGNOSIS — I49.1 PAC (PREMATURE ATRIAL CONTRACTION): ICD-10-CM

## 2023-11-10 PROCEDURE — 93226 XTRNL ECG REC<48 HR SCAN A/R: CPT

## 2023-11-10 PROCEDURE — 93306 TTE W/DOPPLER COMPLETE: CPT | Mod: 26 | Performed by: INTERNAL MEDICINE

## 2023-11-10 PROCEDURE — 93306 TTE W/DOPPLER COMPLETE: CPT

## 2023-11-16 ENCOUNTER — MYC MEDICAL ADVICE (OUTPATIENT)
Dept: INTERNAL MEDICINE | Facility: CLINIC | Age: 28
End: 2023-11-16

## 2023-11-16 PROCEDURE — 93227 XTRNL ECG REC<48 HR R&I: CPT | Performed by: INTERNAL MEDICINE

## 2023-11-17 ENCOUNTER — MYC MEDICAL ADVICE (OUTPATIENT)
Dept: CARDIOLOGY | Facility: CLINIC | Age: 28
End: 2023-11-17
Payer: COMMERCIAL

## 2023-11-17 DIAGNOSIS — R00.2 PALPITATIONS: Primary | ICD-10-CM

## 2023-11-17 NOTE — TELEPHONE ENCOUNTER
Pt was refer to neurologist before.   Referral sent again on 10/11, next appointment at 2/23/23.   LOV 10/26.   Pt experiencing symptoms.     Any advice in the meantime.     Nash KELLEY RN

## 2023-11-17 NOTE — TELEPHONE ENCOUNTER
Her option would be an ER visit at this point, or try to get into another Health [facility]'s Neurology, such as [facility].

## 2023-11-20 NOTE — TELEPHONE ENCOUNTER
From: Guillermo Stein MD  Sent: 11/17/2023   5:10 PM CST  To: Ashley Carranza; Porsche Mathis  Subject: FW: Heart and blood pressure                     Ashley,    Should see Colleen in 3 to 4 months.  
No

## 2024-01-02 ENCOUNTER — MYC MEDICAL ADVICE (OUTPATIENT)
Dept: FAMILY MEDICINE | Facility: CLINIC | Age: 29
End: 2024-01-02
Payer: COMMERCIAL

## 2024-01-02 ENCOUNTER — MYC MEDICAL ADVICE (OUTPATIENT)
Dept: INTERNAL MEDICINE | Facility: CLINIC | Age: 29
End: 2024-01-02
Payer: COMMERCIAL

## 2024-01-03 ENCOUNTER — OFFICE VISIT (OUTPATIENT)
Dept: FAMILY MEDICINE | Facility: CLINIC | Age: 29
End: 2024-01-03
Payer: COMMERCIAL

## 2024-01-03 VITALS
WEIGHT: 164.7 LBS | TEMPERATURE: 99.8 F | HEIGHT: 62 IN | BODY MASS INDEX: 30.31 KG/M2 | DIASTOLIC BLOOD PRESSURE: 78 MMHG | OXYGEN SATURATION: 97 % | SYSTOLIC BLOOD PRESSURE: 118 MMHG | HEART RATE: 84 BPM

## 2024-01-03 DIAGNOSIS — N89.8 VAGINAL DISCHARGE: Primary | ICD-10-CM

## 2024-01-03 DIAGNOSIS — R35.0 URINARY FREQUENCY: ICD-10-CM

## 2024-01-03 LAB
ALBUMIN UR-MCNC: NEGATIVE MG/DL
APPEARANCE UR: CLEAR
BILIRUB UR QL STRIP: NEGATIVE
CLUE CELLS: ABNORMAL
COLOR UR AUTO: YELLOW
GLUCOSE UR STRIP-MCNC: NEGATIVE MG/DL
HGB UR QL STRIP: NEGATIVE
KETONES UR STRIP-MCNC: NEGATIVE MG/DL
LEUKOCYTE ESTERASE UR QL STRIP: NEGATIVE
NITRATE UR QL: NEGATIVE
PH UR STRIP: 6 [PH] (ref 5–7)
SP GR UR STRIP: 1.01 (ref 1–1.03)
TRICHOMONAS, WET PREP: ABNORMAL
UROBILINOGEN UR STRIP-ACNC: 0.2 E.U./DL
WBC'S/HIGH POWER FIELD, WET PREP: ABNORMAL
YEAST, WET PREP: ABNORMAL

## 2024-01-03 PROCEDURE — 87210 SMEAR WET MOUNT SALINE/INK: CPT | Mod: QW | Performed by: NURSE PRACTITIONER

## 2024-01-03 PROCEDURE — 99214 OFFICE O/P EST MOD 30 MIN: CPT | Performed by: NURSE PRACTITIONER

## 2024-01-03 PROCEDURE — 87491 CHLMYD TRACH DNA AMP PROBE: CPT | Performed by: NURSE PRACTITIONER

## 2024-01-03 PROCEDURE — 81003 URINALYSIS AUTO W/O SCOPE: CPT | Mod: QW | Performed by: NURSE PRACTITIONER

## 2024-01-03 PROCEDURE — 87591 N.GONORRHOEAE DNA AMP PROB: CPT | Performed by: NURSE PRACTITIONER

## 2024-01-03 NOTE — PROGRESS NOTES
"  Assessment & Plan     (N89.8) Vaginal discharge  (primary encounter diagnosis)  Comment:   Plan: NEISSERIA GONORRHOEA PCR, CHLAMYDIA TRACHOMATIS        PCR, Wet preparation            (R35.0) Urinary frequency  Comment:   Plan: UA Macroscopic with reflex to Microscopic and         Culture - Lab Collect          Await lab results                 Adelia Grier NP  [facility]    Isaac Del Angel is a 28 year old, presenting for the following health issues: vaginal symptoms that started just over one week  Male partner  Condoms, 100%  Monthly periods, LMP 12/6/2023    Having odor and discharge, also significant pain with urination, wants to restart pyridium (has supply)  No pain with IC  Vaginal Problem and vaginal odor        1/3/2024     2:30 PM   Additional Questions   Roomed by yissel carter   Accompanied by self     History of Present Illness       Reason for visit:  Uti/BV    She eats 2-3 servings of fruits and vegetables daily.She consumes 0 sweetened beverage(s) daily.She exercises with enough effort to increase her heart rate 10 to 19 minutes per day.  She exercises with enough effort to increase her heart rate 3 or less days per week.   She is taking medications regularly.     Vaginal Symptom  Description:  Vaginal Discharge: clear   Itching (Pruritis): No  Burning sensation:  No  Odor: YES  Accompanying Signs & Symptoms:  Urinary symptoms: YES- frequency, urgency  Abdominal pain: No  Fever: No  History:   Sexually active: YES  New Partner: No  Possibility of Pregnancy:  No  Recent antibiotic use: No  Previous vaginitis issues: YES - BV and chronic UTIs  Therapies tried and outcome: over the counter generic version of AZO        Review of Systems         Objective    /78 (BP Location: Right arm, Patient Position: Sitting)   Pulse 84   Temp 99.8  F (37.7  C)   Ht 1.575 m (5' 2\")   Wt 74.7 kg (164 lb 11.2 oz)   LMP 12/06/2023 (Approximate)   SpO2 97%   Breastfeeding No   " BMI 30.12 kg/m    Body mass index is 30.12 kg/m .  Physical Exam   GENERAL: healthy, alert and no distress  ABDOMEN: soft, nontender, no hepatosplenomegaly, no masses and bowel sounds normal   (female): normal female external genitalia, normal urethral meatus, vaginal mucosa, normal cervix/adnexa/uterus without masses or discharge, some mild excoriation on cervix  MS: no gross musculoskeletal defects noted, no edema

## 2024-01-04 LAB
C TRACH DNA SPEC QL NAA+PROBE: NEGATIVE
N GONORRHOEA DNA SPEC QL NAA+PROBE: NEGATIVE

## 2024-01-23 ENCOUNTER — OFFICE VISIT (OUTPATIENT)
Dept: CARDIOLOGY | Facility: CLINIC | Age: 29
End: 2024-01-23
Attending: INTERNAL MEDICINE
Payer: COMMERCIAL

## 2024-01-23 VITALS
DIASTOLIC BLOOD PRESSURE: 84 MMHG | BODY MASS INDEX: 29.81 KG/M2 | HEART RATE: 83 BPM | OXYGEN SATURATION: 98 % | WEIGHT: 163 LBS | SYSTOLIC BLOOD PRESSURE: 126 MMHG

## 2024-01-23 DIAGNOSIS — I10 ESSENTIAL HYPERTENSION: ICD-10-CM

## 2024-01-23 DIAGNOSIS — I49.1 PAC (PREMATURE ATRIAL CONTRACTION): ICD-10-CM

## 2024-01-23 DIAGNOSIS — R00.2 PALPITATIONS: Primary | ICD-10-CM

## 2024-01-23 PROCEDURE — 99214 OFFICE O/P EST MOD 30 MIN: CPT

## 2024-01-23 RX ORDER — AMLODIPINE BESYLATE 5 MG/1
5 TABLET ORAL DAILY
Qty: 90 TABLET | Refills: 3 | Status: SHIPPED | OUTPATIENT
Start: 2024-01-23

## 2024-01-23 NOTE — PROGRESS NOTES
HEART CARE ENCOUNTER CONSULTATON L[facility]ON      [facility]  [phone]      Assessment/Recommendations   Assessment:   Palpitations/premature atrial contractions: 5% PACs on recent Holter monitor, normal echocardiogram without structural abnormality and LVEF 65-70%.   - Continues to experience daily fluttering palpitations.  Holter monitor did not  few times monthly episodes lasting around or less than 1 minute of hard heartbeats/palpitations sometimes accompanied by nausea and sometimes relieved with coughing.  Symptoms occur at rest. 2-week cardiac event monitor 9/2022 with similar findings of PACs without significant arrhythmia.  - Denies family history of sudden cardiac death, MI.  Heart failure in grandfather.  - Without structural abnormalities on echo, no significant Fhx of heart disease/SCD, syncope, lack of symptom progression or change for over 1 year feel patient's risk of significant arrhythmia is low.  Hypertension: Controlled on amlodipine      Plan:   Discussed beta blocker trial for symptomatic treatment of PACs, as well as potential side effects of medication including fatigue, bradycardia, worsening asthma.  Patient decides to monitor symptoms at this time.  Will contact clinic if she changes her mind.  Report increased frequency/severity of episodes/symptoms.  At that time consider 3-4-week event monitor.  If episodes of palpitations do not wayne, experiences syncope, chest discomfort notify clinic/seek urgent care  Non-smoker, stress reduction continue reduced/no caffeine intake, alcohol intake  Amlodipine refilled        Follow up in 1 year or sooner as needed     History of Present Illness/Subjective    HPI: Mer Gonzalez is a 28 year old female with PMHx of PACs, hypertension, palpitations, asthma, migraines presents for follow up of palpitations.  Recent 24-hour Holter monitor and echocardiogram performed.    Continues to experience near daily fluttering  palpitations.  Holter monitor did not  few times monthly episodes lasting around or less than 1 minute of hard heartbeats/palpitations sometimes accompanied by nausea and sometimes relieved with coughing.  Symptoms always occur at rest, no exertional dyspnea/chest pain.  Denies syncope or near syncope.  She does not smoke or vape, drinks little alcohol and caffeine, does not use albuterol inhaler often only situationally.  No significant emotional stress at this time.  She did have 2-week cardiac event monitor 1.5 years ago with similar symptoms with findings of PACs.    Denies family history of sudden cardiac death, MI, but positive for hypertension.  Patient is somewhat uncertain of family history of heart disease, known heart failure and her grandfather who  at 74.      She denies lightheadedness, shortness of breath, dyspnea on exertion, chest pain, and lower extremity edema.      Echocardiogram 11/10/2023 Results:  1. Normal left ventricular size and systolic performance with a visually  estimated ejection fraction of 65-70%.  2. No significant valvular heart disease is identified on this study.  3. Normal right ventricular size and systolic performance.    Holter monitoring from 11/10/2023 to 2023 (duration 24hrs). Predominant underlying rhythm was sinus rhythm, 51 to 133bpm, average 76bpm. No nonsustained or sustained tachyarrhythmias. No atrial fibrillation. There were no pauses of greater than 3 seconds. Occasional supraventricular ectopic beats (5%). Rare premature ventricular contractions (<1%). No symptoms recorded.     Physical Examination  Review of Systems   Vitals: /84 (BP Location: Right arm, Patient Position: Sitting, Cuff Size: Adult Regular)   Pulse 83   Wt 73.9 kg (163 lb)   LMP 2023 (Approximate)   SpO2 98%   BMI 29.81 kg/m    BMI= Body mass index is 29.81 kg/m .  Wt Readings from Last 3 Encounters:   24 73.9 kg (163 lb)   24 74.7 kg (164 lb 11.2  oz)   10/27/23 74.6 kg (164 lb 8 oz)           ENT/Mouth: membranes moist, no oral lesions or bleeding gums.      EYES:  no scleral icterus, normal conjunctivae       Chest/Lungs:   lungs are clear to auscultation, no rales or wheezing, equal chest wall expansion    Cardiovascular:   Regular. Normal first and second heart sounds with no murmurs, rubs, or gallops; the radial and posterior tibial pulses are intact, absent edema bilaterally        Extremities: no cyanosis or clubbing   Skin: no xanthelasma, warm.    Neurologic: no tremors     Psychiatric: alert and oriented x3, calm        Please refer above for cardiac ROS details.        Medical History  Surgical History Family History Social History   Past Medical History:   Diagnosis Date    Hypertension      Past Surgical History:   Procedure Laterality Date    DILATION AND CURETTAGE  11/2022    Done [facility]roids [facility] [place] [facility]    TONSILLECTOMY      WISDOM TOOTH EXTRACTION  01/01/2011     Family History   Problem Relation Age of Onset    Kidney Cancer Father         age 48    Allergic rhinitis Sister         Social History     Socioeconomic History    Marital status: Single     Spouse name: Not on file    Number of children: Not on file    Years of education: Not on file    Highest education level: Not on file   Occupational History    Not on file   Tobacco Use    Smoking status: Never     Passive exposure: Never    Smokeless tobacco: Never   Vaping Use    Vaping Use: Never used   Substance and Sexual Activity    Alcohol use: Not Currently    Drug use: Never    Sexual activity: Yes     Partners: Male     Birth control/protection: Condom   Other Topics Concern    Not on file   Social History Narrative    Not on file     Social Determinants of Health     Financial Resource Strain: Low Risk  (1/3/2024)    Financial Resource Strain     Within the past 12 months, have you or your family members you live with been unable to get utilities (heat, electricity) when  it was really needed?: No   Food Insecurity: Low Risk  (1/3/2024)    Food Insecurity     Within the past 12 months, did you worry that your food would run out before you got money to buy more?: No     Within the past 12 months, did the food you bought just not last and you didn t have money to get more?: No   Transportation Needs: Low Risk  (1/3/2024)    Transportation Needs     Within the past 12 months, has lack of transportation kept you from medical appointments, getting your medicines, non-medical meetings or appointments, work, or from getting things that you need?: No   Physical Activity: Not on file   Stress: Not on file   Social Connections: Not on file   Interpersonal Safety: Not At Risk (10/28/2022)    Humiliation, Afraid, Rape, and Kick questionnaire     Fear of Current or Ex-Partner: No     Emotionally Abused: No     Physically Abused: No     Sexually Abused: No   Housing Stability: Low Risk  (1/3/2024)    Housing Stability     Do you have housing? : Yes     Are you worried about losing your housing?: No           Medications  Allergies   Current Outpatient Medications   Medication Sig Dispense Refill    albuterol (PROAIR HFA/PROVENTIL HFA/VENTOLIN HFA) 108 (90 Base) MCG/ACT inhaler Inhale 2 puffs into the lungs every 6 hours as needed for shortness of breath, wheezing or cough 18 g 3    amLODIPine (NORVASC) 5 MG tablet Take 1 tablet (5 mg) by mouth daily 90 tablet 2       Allergies   Allergen Reactions    Amoxicillin Hives    Cephalexin      Other reaction(s): brain fog, emotional    Sulfamethoxazole-Trimethoprim [Sulfamethoxazole-Trimethoprim] Unknown    Latex Hives, Itching, Rash and Swelling          Lab Results    Chemistry/lipid CBC Cardiac Enzymes/BNP/TSH/INR   Recent Labs   Lab Test 05/18/23  0747   CHOL 210*   HDL 58   *   TRIG 67     Recent Labs   Lab Test 05/18/23  0747 03/25/22  1209   * 131*     Recent Labs   Lab Test 10/16/23  0036      POTASSIUM 3.6   CHLORIDE 105  "  CO2 23   GLC 97   BUN 13.6   CR 0.76   GFRESTIMATED >90   VICENTE 9.1     Recent Labs   Lab Test 10/16/23  0036 07/28/23  1353 05/18/23  0747   CR 0.76 0.75 0.74     No results for input(s): \"A1C\" in the last 29213 hours.       Recent Labs   Lab Test 10/16/23  0036   WBC 8.3   HGB 12.3   HCT 37.0   MCV 82        Recent Labs   Lab Test 10/16/23  0036 07/28/23  1353 11/27/22  1335   HGB 12.3 12.6 12.6    Recent Labs   Lab Test 09/15/22  0828 08/28/22  0052   TROPONINI <0.01 <0.01     No results for input(s): \"BNP\", \"NTBNPI\", \"NTBNP\" in the last 35566 hours.  Recent Labs   Lab Test 10/16/23  0036   TSH 2.94     No results for input(s): \"INR\" in the last 69733 hours.       This note has been dictated using voice recognition software. Any grammatical, typographical, or context distortions are unintentional and inherent to the software    Colleen Tapia PA-C                                       "

## 2024-01-23 NOTE — LETTER
1/23/2024    Lesly Emery, CNP  [address] [place] [address]    RE: Mer Gonzalez       Dear Colleague,     I had the pleasure of seeing Mer Gonzalez in the [facility].    HEART CARE ENCOUNTER CONSULTATON NOTE      [facility]  [phone]      Assessment/Recommendations   Assessment:   Palpitations/premature atrial contractions: 5% PACs on recent Holter monitor, normal echocardiogram without structural abnormality and LVEF 65-70%.   - Continues to experience daily fluttering palpitations.  Holter monitor did not  few times monthly episodes lasting around or less than 1 minute of hard heartbeats/palpitations sometimes accompanied by nausea and sometimes relieved with coughing.  Symptoms occur at rest. 2-week cardiac event monitor 9/2022 with similar findings of PACs without significant arrhythmia.  - Denies family history of sudden cardiac death, MI.  Heart failure in grandfather.  - Without structural abnormalities on echo, no significant Fhx of heart disease/SCD, syncope, lack of symptom progression or change for over 1 year feel patient's risk of significant arrhythmia is low.  Hypertension: Controlled on amlodipine      Plan:   Discussed beta blocker trial for symptomatic treatment of PACs, as well as potential side effects of medication including fatigue, bradycardia, worsening asthma.  Patient decides to monitor symptoms at this time.  Will contact clinic if she changes her mind.  Report increased frequency/severity of episodes/symptoms.  At that time consider 3-4-week event monitor.  If episodes of palpitations do not wayne, experiences syncope, chest discomfort notify clinic/seek urgent care  Non-smoker, stress reduction continue reduced/no caffeine intake, alcohol intake  Amlodipine refilled        Follow up in 1 year or sooner as needed     History of Present Illness/Subjective    HPI: Mer Gonzalez is a 28 year old female with PMHx of PACs,  hypertension, palpitations, asthma, migraines presents for follow up of palpitations.  Recent 24-hour Holter monitor and echocardiogram performed.    Continues to experience near daily fluttering palpitations.  Holter monitor did not  few times monthly episodes lasting around or less than 1 minute of hard heartbeats/palpitations sometimes accompanied by nausea and sometimes relieved with coughing.  Symptoms always occur at rest, no exertional dyspnea/chest pain.  Denies syncope or near syncope.  She does not smoke or vape, drinks little alcohol and caffeine, does not use albuterol inhaler often only situationally.  No significant emotional stress at this time.  She did have 2-week cardiac event monitor 1.5 years ago with similar symptoms with findings of PACs.    Denies family history of sudden cardiac death, MI, but positive for hypertension.  Patient is somewhat uncertain of family history of heart disease, known heart failure and her grandfather who  at 74.      She denies lightheadedness, shortness of breath, dyspnea on exertion, chest pain, and lower extremity edema.      Echocardiogram 11/10/2023 Results:  1. Normal left ventricular size and systolic performance with a visually  estimated ejection fraction of 65-70%.  2. No significant valvular heart disease is identified on this study.  3. Normal right ventricular size and systolic performance.    Holter monitoring from 11/10/2023 to 2023 (duration 24hrs). Predominant underlying rhythm was sinus rhythm, 51 to 133bpm, average 76bpm. No nonsustained or sustained tachyarrhythmias. No atrial fibrillation. There were no pauses of greater than 3 seconds. Occasional supraventricular ectopic beats (5%). Rare premature ventricular contractions (<1%). No symptoms recorded.     Physical Examination  Review of Systems   Vitals: /84 (BP Location: Right arm, Patient Position: Sitting, Cuff Size: Adult Regular)   Pulse 83   Wt 73.9 kg (163 lb)    LMP 12/06/2023 (Approximate)   SpO2 98%   BMI 29.81 kg/m    BMI= Body mass index is 29.81 kg/m .  Wt Readings from Last 3 Encounters:   01/23/24 73.9 kg (163 lb)   01/03/24 74.7 kg (164 lb 11.2 oz)   10/27/23 74.6 kg (164 lb 8 oz)           ENT/Mouth: membranes moist, no oral lesions or bleeding gums.      EYES:  no scleral icterus, normal conjunctivae       Chest/Lungs:   lungs are clear to auscultation, no rales or wheezing, equal chest wall expansion    Cardiovascular:   Regular. Normal first and second heart sounds with no murmurs, rubs, or gallops; the radial and posterior tibial pulses are intact, absent edema bilaterally        Extremities: no cyanosis or clubbing   Skin: no xanthelasma, warm.    Neurologic: no tremors     Psychiatric: alert and oriented x3, calm        Please refer above for cardiac ROS details.        Medical History  Surgical History Family History Social History   Past Medical History:   Diagnosis Date    Hypertension      Past Surgical History:   Procedure Laterality Date    DILATION AND CURETTAGE  11/2022    Done for fibroids at [place] [facility]    TONSILLECTOMY      WISDOM TOOTH EXTRACTION  01/01/2011     Family History   Problem Relation Age of Onset    Kidney Cancer Father         age 48    Allergic rhinitis Sister         Social History     Socioeconomic History    Marital status: Single     Spouse name: Not on file    Number of children: Not on file    Years of education: Not on file    Highest education level: Not on file   Occupational History    Not on file   Tobacco Use    Smoking status: Never     Passive exposure: Never    Smokeless tobacco: Never   Vaping Use    Vaping Use: Never used   Substance and Sexual Activity    Alcohol use: Not Currently    Drug use: Never    Sexual activity: Yes     Partners: Male     Birth control/protection: Condom   Other Topics Concern    Not on file   Social History Narrative    Not on file     Social Determinants of Health     Financial  Resource Strain: Low Risk  (1/3/2024)    Financial Resource Strain     Within the past 12 months, have you or your family members you live with been unable to get utilities (heat, electricity) when it was really needed?: No   Food Insecurity: Low Risk  (1/3/2024)    Food Insecurity     Within the past 12 months, did you worry that your food would run out before you got money to buy more?: No     Within the past 12 months, did the food you bought just not last and you didn t have money to get more?: No   Transportation Needs: Low Risk  (1/3/2024)    Transportation Needs     Within the past 12 months, has lack of transportation kept you from medical appointments, getting your medicines, non-medical meetings or appointments, work, or from getting things that you need?: No   Physical Activity: Not on file   Stress: Not on file   Social Connections: Not on file   Interpersonal Safety: Not At Risk (10/28/2022)    Humiliation, Afraid, Rape, and Kick questionnaire     Fear of Current or Ex-Partner: No     Emotionally Abused: No     Physically Abused: No     Sexually Abused: No   Housing Stability: Low Risk  (1/3/2024)    Housing Stability     Do you have housing? : Yes     Are you worried about losing your housing?: No           Medications  Allergies   Current Outpatient Medications   Medication Sig Dispense Refill    albuterol (PROAIR HFA/PROVENTIL HFA/VENTOLIN HFA) 108 (90 Base) MCG/ACT inhaler Inhale 2 puffs into the lungs every 6 hours as needed for shortness of breath, wheezing or cough 18 g 3    amLODIPine (NORVASC) 5 MG tablet Take 1 tablet (5 mg) by mouth daily 90 tablet 2       Allergies   Allergen Reactions    Amoxicillin Hives    Cephalexin      Other reaction(s): brain fog, emotional    Sulfamethoxazole-Trimethoprim [Sulfamethoxazole-Trimethoprim] Unknown    Latex Hives, Itching, Rash and Swelling          Lab Results    Chemistry/lipid CBC Cardiac Enzymes/BNP/TSH/INR   Recent Labs   Lab Test 05/18/23  6056  "  CHOL 210*   HDL 58   *   TRIG 67     Recent Labs   Lab Test 05/18/23  0747 03/25/22  1209   * 131*     Recent Labs   Lab Test 10/16/23  0036      POTASSIUM 3.6   CHLORIDE 105   CO2 23   GLC 97   BUN 13.6   CR 0.76   GFRESTIMATED >90   VICENTE 9.1     Recent Labs   Lab Test 10/16/23  0036 07/28/23  1353 05/18/23  0747   CR 0.76 0.75 0.74     No results for input(s): \"A1C\" in the last 36507 hours.       Recent Labs   Lab Test 10/16/23  0036   WBC 8.3   HGB 12.3   HCT 37.0   MCV 82        Recent Labs   Lab Test 10/16/23  0036 07/28/23  1353 11/27/22  1335   HGB 12.3 12.6 12.6    Recent Labs   Lab Test 09/15/22  0828 08/28/22  0052   TROPONINI <0.01 <0.01     No results for input(s): \"BNP\", \"NTBNPI\", \"NTBNP\" in the last 59323 hours.  Recent Labs   Lab Test 10/16/23  0036   TSH 2.94     No results for input(s): \"INR\" in the last 74413 hours.       This note has been dictated using voice recognition software. Any grammatical, typographical, or context distortions are unintentional and inherent to the software    Colleen Tapia PA-C            Thank you for allowing me to participate in the care of your patient.      Sincerely,     Colleen Escobar PA-C     [facility]  cc:   Guillermo Stein MD  [address] [place] [address]      "

## 2024-02-07 ENCOUNTER — MYC MEDICAL ADVICE (OUTPATIENT)
Dept: CARDIOLOGY | Facility: CLINIC | Age: 29
End: 2024-02-07
Payer: COMMERCIAL

## 2024-02-07 DIAGNOSIS — R00.2 PALPITATIONS: Primary | ICD-10-CM

## 2024-02-08 NOTE — TELEPHONE ENCOUNTER
From: Colleen Tapia PA-C  Sent: 2/8/2024   8:28 AM CST  To: Ashley Carranza  Subject: FW: palpitations                                 Recommending repeat monitoring with 3-week cardiac event monitor for episodes of palpitations, please order.    Thanks  MH

## 2024-02-13 ENCOUNTER — HOSPITAL ENCOUNTER (OUTPATIENT)
Dept: CARDIOLOGY | Facility: CLINIC | Age: 29
Discharge: HOME | End: 2024-02-13
Payer: COMMERCIAL

## 2024-02-13 DIAGNOSIS — R00.2 PALPITATIONS: ICD-10-CM

## 2024-02-13 PROCEDURE — 93270 REMOTE 30 DAY ECG REV/REPORT: CPT

## 2024-02-23 ENCOUNTER — OFFICE VISIT (OUTPATIENT)
Dept: NEUROLOGY | Facility: CLINIC | Age: 29
End: 2024-02-23
Attending: NURSE PRACTITIONER
Payer: COMMERCIAL

## 2024-02-23 VITALS
SYSTOLIC BLOOD PRESSURE: 128 MMHG | BODY MASS INDEX: 29.63 KG/M2 | DIASTOLIC BLOOD PRESSURE: 98 MMHG | HEART RATE: 88 BPM | RESPIRATION RATE: 16 BRPM | WEIGHT: 162 LBS

## 2024-02-23 DIAGNOSIS — R42 LIGHTHEADEDNESS: ICD-10-CM

## 2024-02-23 DIAGNOSIS — H93.A9 PULSATILE TINNITUS: ICD-10-CM

## 2024-02-23 DIAGNOSIS — G43.109 VERTIGINOUS MIGRAINE: Primary | ICD-10-CM

## 2024-02-23 PROCEDURE — 99417 PROLNG OP E/M EACH 15 MIN: CPT | Performed by: PSYCHIATRY & NEUROLOGY

## 2024-02-23 PROCEDURE — 99215 OFFICE O/P EST HI 40 MIN: CPT | Performed by: PSYCHIATRY & NEUROLOGY

## 2024-02-23 NOTE — NURSING NOTE
Chief Complaint   Patient presents with    Consult     Vertigo, Head/facial pressure - intermittently       Patient states that it happens every couple of weeks and lasting 20- 60 minutes each episode.   Natalie Mancini MA,[facility],7:33 AM

## 2024-02-23 NOTE — PROGRESS NOTES
NEUROLOGY OUTPATIENT CONSULT NOTE  Feb 23, 2024     CHIEF COMPLAINT/REASON FOR VISIT/REASON FOR CONSULT  Patient presents with:  Consult: Vertigo, Head/facial pressure - intermittently     REASON FOR CONSULTATION- Vertigo, head pressure    REFERRAL SOURCE  Dr. Lesly Emery   Dr. Lesly Emery    HISTORY OF PRESENT ILLNESS  Mer Gonzalez is a 28 year old female seen today for evaluation of head pressure and vertigo.  Symptoms started about a year and a half ago.  She does have a history of migraines but these were infrequent and brought on by stress or dehydration.    Currently the symptoms have subsided are happening once a week.  Before they were every day.  Currently they only last for 20 minutes to less than 1 hour.  She would have a pressure behind her eyes and sometimes all the way back to her head.  This would be associated with a vertiginous sensation.  She does not take any abortive medications.  She was seen by previous neurology and MRI brain/CTV was done which was negative.  Was referred to ENT and they recommended magnesium though her magnesium level was elevated and she never took that.  There was no clear provoking factor on why the symptoms came on a year and a half ago.    She also complains of some rushing sound in the right ear this happens mainly when she stands up.  She does feel lightheaded when she stands up.  Has been on amlodipine for blood pressure.  Symptoms have been worse since she has been on the amlodipine.    Previous history is reviewed and this is unchanged.    PAST MEDICAL/SURGICAL HISTORY  Past Medical History:   Diagnosis Date    Hypertension      Patient Active Problem List   Diagnosis    Migraine without aura and without status migrainosus, not intractable    Scoliosis    PMDD (premenstrual dysphoric disorder)    Essential hypertension    Palpitations    PAC (premature atrial contraction)   Significant for high cholesterol, high blood pressure, migraines    FAMILY  HISTORY  Family History   Problem Relation Age of Onset    Kidney Cancer Father         age 48    Allergic rhinitis Sister    Positive for epilepsy related to accident in her grandmother.    SOCIAL HISTORY  Social History     Tobacco Use    Smoking status: Never     Passive exposure: Never    Smokeless tobacco: Never   Vaping Use    Vaping Use: Never used   Substance Use Topics    Alcohol use: Not Currently    Drug use: Never       SYSTEMS REVIEW  Twelve-system ROS was done and other than the HPI this was negative/positive for numbness/tingling, balance/coordination problems, bladder symptoms, dizziness, ringing in the ears, vision symptoms, sleepy during the day, anxiety, palpitations, cardiac/heart problems, bowel problems    MEDICATIONS  albuterol (PROAIR HFA/PROVENTIL HFA/VENTOLIN HFA) 108 (90 Base) MCG/ACT inhaler, Inhale 2 puffs into the lungs every 6 hours as needed for shortness of breath, wheezing or cough  amLODIPine (NORVASC) 5 MG tablet, Take 1 tablet (5 mg) by mouth daily    No current facility-administered medications on file prior to visit.       PHYSICAL EXAMINATION  VITALS: /84   Pulse 70   Resp 16   Wt 73.5 kg (162 lb)   LMP 12/06/2023 (Approximate)   BMI 29.63 kg/m    GENERAL: Healthy appearing, alert, no acute distress, normal habitus.  CARDIOVASCULAR: Extremities warm and well perfused. Pulses present.   NEUROLOGICAL:  Patient is awake and oriented to self, place and time.  Attention span is normal.  Memory is grossly intact.  Language is fluent and follows commands appropriately.  Appropriate fund of knowledge. Cranial nerves 2-12 are intact. There is no pronator drift.  Motor exam shows 5/5 strength in all extremities.  Tone is symmetric bilaterally in upper and lower extremities.  Reflexes are symmetric and 2+ in upper extremities and lower extremities. Sensory exam is grossly intact to light touch, pin prick and vibration.  Finger to nose and heel to shin is without dysmetria.   Romberg is negative.  Gait is normal and the patient is able to do tandem walk and walk on toes and heels.        DIAGNOSTICS  MRI-images reviewed.  No concerning acute findings.  IMPRESSION:  1.  Unremarkable brain MRI without evidence for acute intracranial process.  2.  Subtle loss of the normal distal transverse venous sinus flow voids potentially corresponding with findings on recent CTV. No additional secondary findings to suggest elevated intracranial pressure or radiographic intracranial hypertension.    CTV  HEAD CT:  1.  Normal head CT.     HEAD CTV:   1.  Mild to moderate narrowing of the lateral recesses bilaterally. This may be congenital or secondary to stenosis. If additional radiographic evaluation is indicated conventional catheter directed CT venogram could BE considered.  2.  Negative for dural venous sinus thrombosis.    RELEVANT LABS  Component      Latest Ref Rng 9/1/2023  3:49 PM 10/16/2023  12:36 AM   Sodium      135 - 145 mmol/L  140    Potassium      3.4 - 5.3 mmol/L  3.6    Carbon Dioxide (CO2)      22 - 29 mmol/L  23    Anion Gap      7 - 15 mmol/L  12    Urea Nitrogen      6.0 - 20.0 mg/dL  13.6    Creatinine      0.51 - 0.95 mg/dL  0.76    GFR Estimate      >60 mL/min/1.73m2  >90    Calcium      8.6 - 10.0 mg/dL  9.1    Chloride      98 - 107 mmol/L  105    Glucose      70 - 99 mg/dL  97    Alkaline Phosphatase      35 - 104 U/L  60    AST      0 - 45 U/L  18    ALT      0 - 50 U/L  11    Protein Total      6.4 - 8.3 g/dL  7.1    Albumin      3.5 - 5.2 g/dL  4.3    Bilirubin Total      <=1.2 mg/dL  0.5    WBC      4.0 - 11.0 10e3/uL  8.3    RBC Count      3.80 - 5.20 10e6/uL  4.52    Hemoglobin      11.7 - 15.7 g/dL  12.3    Hematocrit      35.0 - 47.0 %  37.0    MCV      78 - 100 fL  82    MCH      26.5 - 33.0 pg  27.2    MCHC      31.5 - 36.5 g/dL  33.2    RDW      10.0 - 15.0 %  14.3    Platelet Count      150 - 450 10e3/uL  269    TSH      0.30 - 4.20 uIU/mL 1.63  2.94         Component      Latest Ref Rng 10/16/2023  12:36 AM   Magnesium      1.7 - 2.3 mg/dL 2.8 (H)       Legend:  (H) High    Holter      ECHO  1. Normal left ventricular size and systolic performance with a visually  estimated ejection fraction of 65-70%.  2. No significant valvular heart disease is identified on this study.  3. Normal right ventricular size and systolic performance.      OUTSIDE RECORDS  Outside referral notes and chart notes were reviewed and pertinent information has been summarized (in addition to the HPI):-    ENT    Neurology        IMPRESSION/REPORT/PLAN  Vertiginous migraine  Lightheadedness  Pulsatile tinnitus  History of hypertension on medication    This is a 28 year old female with episodes of head pressure with associated vertigo suggestive of vestibular migraines.  She does have a previous history of migraines.  Exam today is noncontributory.  MRI/CTV have been negative in the past.  Encouraged her to keep a log of her headaches to identify any triggers.  She was given a list of possible triggers.  Currently headaches have improved and are once a week and will hold off on preventive medication.  For abortive medication she can try Excedrin Migraine.  Discussed trial of steroids to see if they would improve and she wants to hold off on that for right now.    In terms of her lightheadedness and the rushing sound in a year with standing up most likely suggestive of presyncopal spells from orthostatic hypotension.  Could have POTS syndrome.  Could be related to her blood pressure medication amlodipine.  She does drink plenty of water.  Given positional tinnitus we will check a CT angiogram head and neck to rule out any stenosis/vascular malformation.  Orthostatic vitals were negative today.  Encourage good hydration.    I can see her back in 4 to 5 months to monitor her symptoms.    -     CTA Head Neck w Contrast; Future    Return in about 5 months (around 7/23/2024) for In-Clinic Visit  (must), After testing.    Over 60 minutes were spent coordinating the care for the patient on the day of the encounter.  This includes previsit, during visit and post visit activities as documented above.  Counseling patient.  Reviewing notes from multiple sources.  Testing reviewed.  Testing ordered.  Multiple problems reviewed/addressed.  High risk.  (Activities include but not inclusive of reviewing chart, reviewing outside records, reviewing labs and imaging study results as well as the images, patient visit time including getting history and exam,  use if applicable, review of test results with the patient and coming up with a plan in a shared model, counseling patient and family, education and answering patient questions, EMR , EMR diagnosis entry and problem list management, medication reconciliation and prescription management if applicable, paperwork if applicable, printing documents and documentation of the visit activities.)        Tai Garcia MD  Neurologist  [facility]  Tel:- [phone]    This note was dictated using voice recognition software.  Any grammatical or context distortions are unintentional and inherent to the software.

## 2024-02-23 NOTE — LETTER
2/23/2024         RE: Mer Gonzalez  [address]  [place] [address]        Dear Colleague,    Thank you for referring your patient, Mer Gonzalez, to the [facility] [place]. Please see a copy of my visit note below.    NEUROLOGY OUTPATIENT CONSULT NOTE  Feb 23, 2024     CHIEF COMPLAINT/REASON FOR VISIT/REASON FOR CONSULT  Patient presents with:  Consult: Vertigo, Head/facial pressure - intermittently     REASON FOR CONSULTATION- Vertigo, head pressure    REFERRAL SOURCE  Dr. Lesly Emery  CC Dr. Lesly Emery    HISTORY OF PRESENT ILLNESS  Mer Gonzalez is a 28 year old female seen today for evaluation of head pressure and vertigo.  Symptoms started about a year and a half ago.  She does have a history of migraines but these were infrequent and brought on by stress or dehydration.    Currently the symptoms have subsided are happening once a week.  Before they were every day.  Currently they only last for 20 minutes to less than 1 hour.  She would have a pressure behind her eyes and sometimes all the way back to her head.  This would be associated with a vertiginous sensation.  She does not take any abortive medications.  She was seen by previous neurology and MRI brain/CTV was done which was negative.  Was referred to ENT and they recommended magnesium though her magnesium level was elevated and she never took that.  There was no clear provoking factor on why the symptoms came on a year and a half ago.    She also complains of some rushing sound in the right ear this happens mainly when she stands up.  She does feel lightheaded when she stands up.  Has been on amlodipine for blood pressure.  Symptoms have been worse since she has been on the amlodipine.    Previous history is reviewed and this is unchanged.    PAST MEDICAL/SURGICAL HISTORY  Past Medical History:   Diagnosis Date     Hypertension      Patient Active Problem List   Diagnosis     Migraine without aura  and without status migrainosus, not intractable     Scoliosis     PMDD (premenstrual dysphoric disorder)     Essential hypertension     Palpitations     PAC (premature atrial contraction)   Significant for high cholesterol, high blood pressure, migraines    FAMILY HISTORY  Family History   Problem Relation Age of Onset     Kidney Cancer Father         age 48     Allergic rhinitis Sister    Positive for epilepsy related to accident in her grandmother.    SOCIAL HISTORY  Social History     Tobacco Use     Smoking status: Never     Passive exposure: Never     Smokeless tobacco: Never   Vaping Use     Vaping Use: Never used   Substance Use Topics     Alcohol use: Not Currently     Drug use: Never       SYSTEMS REVIEW  Twelve-system ROS was done and other than the HPI this was negative/positive for numbness/tingling, balance/coordination problems, bladder symptoms, dizziness, ringing in the ears, vision symptoms, sleepy during the day, anxiety, palpitations, cardiac/heart problems, bowel problems    MEDICATIONS  albuterol (PROAIR HFA/PROVENTIL HFA/VENTOLIN HFA) 108 (90 Base) MCG/ACT inhaler, Inhale 2 puffs into the lungs every 6 hours as needed for shortness of breath, wheezing or cough  amLODIPine (NORVASC) 5 MG tablet, Take 1 tablet (5 mg) by mouth daily    No current facility-administered medications on file prior to visit.       PHYSICAL EXAMINATION  VITALS: /84   Pulse 70   Resp 16   Wt 73.5 kg (162 lb)   LMP 12/06/2023 (Approximate)   BMI 29.63 kg/m    GENERAL: Healthy appearing, alert, no acute distress, normal habitus.  CARDIOVASCULAR: Extremities warm and well perfused. Pulses present.   NEUROLOGICAL:  Patient is awake and oriented to self, place and time.  Attention span is normal.  Memory is grossly intact.  Language is fluent and follows commands appropriately.  Appropriate fund of knowledge. Cranial nerves 2-12 are intact. There is no pronator drift.  Motor exam shows 5/5 strength in all  extremities.  Tone is symmetric bilaterally in upper and lower extremities.  Reflexes are symmetric and 2+ in upper extremities and lower extremities. Sensory exam is grossly intact to light touch, pin prick and vibration.  Finger to nose and heel to shin is without dysmetria.  Romberg is negative.  Gait is normal and the patient is able to do tandem walk and walk on toes and heels.        DIAGNOSTICS  MRI-images reviewed.  No concerning acute findings.  IMPRESSION:  1.  Unremarkable brain MRI without evidence for acute intracranial process.  2.  Subtle loss of the normal distal transverse venous sinus flow voids potentially corresponding with findings on recent CTV. No additional secondary findings to suggest elevated intracranial pressure or radiographic intracranial hypertension.    CTV  HEAD CT:  1.  Normal head CT.     HEAD CTV:   1.  Mild to moderate narrowing of the lateral recesses bilaterally. This may be congenital or secondary to stenosis. If additional radiographic evaluation is indicated conventional catheter directed CT venogram could BE considered.  2.  Negative for dural venous sinus thrombosis.    RELEVANT LABS  Component      Latest Ref Rng 9/1/2023  3:49 PM 10/16/2023  12:36 AM   Sodium      135 - 145 mmol/L  140    Potassium      3.4 - 5.3 mmol/L  3.6    Carbon Dioxide (CO2)      22 - 29 mmol/L  23    Anion Gap      7 - 15 mmol/L  12    Urea Nitrogen      6.0 - 20.0 mg/dL  13.6    Creatinine      0.51 - 0.95 mg/dL  0.76    GFR Estimate      >60 mL/min/1.73m2  >90    Calcium      8.6 - 10.0 mg/dL  9.1    Chloride      98 - 107 mmol/L  105    Glucose      70 - 99 mg/dL  97    Alkaline Phosphatase      35 - 104 U/L  60    AST      0 - 45 U/L  18    ALT      0 - 50 U/L  11    Protein Total      6.4 - 8.3 g/dL  7.1    Albumin      3.5 - 5.2 g/dL  4.3    Bilirubin Total      <=1.2 mg/dL  0.5    WBC      4.0 - 11.0 10e3/uL  8.3    RBC Count      3.80 - 5.20 10e6/uL  4.52    Hemoglobin      11.7 - 15.7  g/dL  12.3    Hematocrit      35.0 - 47.0 %  37.0    MCV      78 - 100 fL  82    MCH      26.5 - 33.0 pg  27.2    MCHC      31.5 - 36.5 g/dL  33.2    RDW      10.0 - 15.0 %  14.3    Platelet Count      150 - 450 10e3/uL  269    TSH      0.30 - 4.20 uIU/mL 1.63  2.94        Component      Latest Ref Rng 10/16/2023  12:36 AM   Magnesium      1.7 - 2.3 mg/dL 2.8 (H)       Legend:  (H) High    Holter      ECHO  1. Normal left ventricular size and systolic performance with a visually  estimated ejection fraction of 65-70%.  2. No significant valvular heart disease is identified on this study.  3. Normal right ventricular size and systolic performance.      OUTSIDE RECORDS  Outside referral notes and chart notes were reviewed and pertinent information has been summarized (in addition to the HPI):-    ENT    Neurology        IMPRESSION/REPORT/PLAN  Vertiginous migraine  Lightheadedness  Pulsatile tinnitus  History of hypertension on medication    This is a 28 year old female with episodes of head pressure with associated vertigo suggestive of vestibular migraines.  She does have a previous history of migraines.  Exam today is noncontributory.  MRI/CTV have been negative in the past.  Encouraged her to keep a log of her headaches to identify any triggers.  She was given a list of possible triggers.  Currently headaches have improved and are once a week and will hold off on preventive medication.  For abortive medication she can try Excedrin Migraine.  Discussed trial of steroids to see if they would improve and she wants to hold off on that for right now.    In terms of her lightheadedness and the rushing sound in a year with standing up most likely suggestive of presyncopal spells from orthostatic hypotension.  Could have POTS syndrome.  Could be related to her blood pressure medication amlodipine.  She does drink plenty of water.  Given positional tinnitus we will check a CT angiogram head and neck to rule out any  stenosis/vascular malformation.  Orthostatic vitals were negative today.  Encourage good hydration.    I can see her back in 4 to 5 months to monitor her symptoms.    -     CTA Head Neck w Contrast; Future    Return in about 5 months (around 7/23/2024) for In-Clinic Visit (must), After testing.    Over 60 minutes were spent coordinating the care for the patient on the day of the encounter.  This includes previsit, during visit and post visit activities as documented above.  Counseling patient.  Reviewing notes from multiple sources.  Testing reviewed.  Testing ordered.  Multiple problems reviewed/addressed.  High risk.  (Activities include but not inclusive of reviewing chart, reviewing outside records, reviewing labs and imaging study results as well as the images, patient visit time including getting history and exam,  use if applicable, review of test results with the patient and coming up with a plan in a shared model, counseling patient and family, education and answering patient questions, EMR , EMR diagnosis entry and problem list management, medication reconciliation and prescription management if applicable, paperwork if applicable, printing documents and documentation of the visit activities.)        Tai Garcia MD  Neurologist  [facility]  Tel:- [phone]    This note was dictated using voice recognition software.  Any grammatical or context distortions are unintentional and inherent to the software.      Again, thank you for allowing me to participate in the care of your patient.        Sincerely,        Tai Garcia MD

## 2024-03-06 PROCEDURE — 93272 ECG/REVIEW INTERPRET ONLY: CPT | Performed by: INTERNAL MEDICINE

## 2024-03-08 ENCOUNTER — MYC MEDICAL ADVICE (OUTPATIENT)
Dept: NEUROLOGY | Facility: CLINIC | Age: 29
End: 2024-03-08
Payer: COMMERCIAL

## 2024-03-08 ENCOUNTER — HOSPITAL ENCOUNTER (OUTPATIENT)
Dept: CT IMAGING | Facility: CLINIC | Age: 29
Discharge: HOME | End: 2024-03-08
Attending: PSYCHIATRY & NEUROLOGY | Admitting: PSYCHIATRY & NEUROLOGY
Payer: COMMERCIAL

## 2024-03-08 ENCOUNTER — NURSE TRIAGE (OUTPATIENT)
Dept: NURSING | Facility: CLINIC | Age: 29
End: 2024-03-08
Payer: COMMERCIAL

## 2024-03-08 DIAGNOSIS — R42 LIGHTHEADEDNESS: ICD-10-CM

## 2024-03-08 DIAGNOSIS — H93.A9 PULSATILE TINNITUS: ICD-10-CM

## 2024-03-08 PROCEDURE — 250N000011 HC RX IP 250 OP 636: Performed by: PSYCHIATRY & NEUROLOGY

## 2024-03-08 PROCEDURE — 70496 CT ANGIOGRAPHY HEAD: CPT

## 2024-03-08 RX ORDER — IOPAMIDOL 755 MG/ML
75 INJECTION, SOLUTION INTRAVASCULAR ONCE
Status: COMPLETED | OUTPATIENT
Start: 2024-03-08 | End: 2024-03-08

## 2024-03-08 RX ADMIN — IOPAMIDOL 75 ML: 755 INJECTION, SOLUTION INTRAVENOUS at 20:00

## 2024-03-08 NOTE — TELEPHONE ENCOUNTER
Nurse Triage SBAR    Is this a 2nd Level Triage? NO    Situation: Patient calling to ask if she can still have a CT scan this evening if she was sick 24 hours ago.  Consent: not needed    Background: Patient is scheduled for a CT scan this evening   Wed night patient states she became sick from something she ate- throwing up Wed evening. Thursday developed a fever for a few hours. Is now symptom/fever free    Assessment:   Fever free - for 24 hours  No nausea of vomiting    Protocol Recommended Disposition:   Discuss With PCP And Callback By Nurse Within 1 Hour    Recommendation: Advised patient speak to neurology clinic.  Transferred caller to clinic for further guidance.    Helen Cornin RN [facility] 3/8/2024 8:31 AM    Reason for Disposition   Nursing judgment    Protocols used: Information Only Call - No Triage-A-OH

## 2024-04-18 ENCOUNTER — PATIENT OUTREACH (OUTPATIENT)
Dept: CARE COORDINATION | Facility: CLINIC | Age: 29
End: 2024-04-18
Payer: COMMERCIAL

## 2024-05-02 ENCOUNTER — PATIENT OUTREACH (OUTPATIENT)
Dept: CARE COORDINATION | Facility: CLINIC | Age: 29
End: 2024-05-02
Payer: COMMERCIAL

## 2024-07-02 ENCOUNTER — OFFICE VISIT (OUTPATIENT)
Dept: FAMILY MEDICINE | Facility: CLINIC | Age: 29
End: 2024-07-02
Payer: COMMERCIAL

## 2024-07-02 VITALS
HEIGHT: 62 IN | HEART RATE: 66 BPM | DIASTOLIC BLOOD PRESSURE: 78 MMHG | BODY MASS INDEX: 29.5 KG/M2 | WEIGHT: 160.3 LBS | TEMPERATURE: 98.7 F | RESPIRATION RATE: 16 BRPM | OXYGEN SATURATION: 97 % | SYSTOLIC BLOOD PRESSURE: 138 MMHG
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
## 2024-07-02 DIAGNOSIS — L02.215 ABSCESS, PERINEUM: ICD-10-CM

## 2024-07-02 PROCEDURE — 99213 OFFICE O/P EST LOW 20 MIN: CPT | Performed by: FAMILY MEDICINE

## 2024-07-02 RX ORDER — DOXYCYCLINE 100 MG/1
100 CAPSULE ORAL 2 TIMES DAILY
Qty: 14 CAPSULE | Refills: 0 | Status: SHIPPED | OUTPATIENT
Start: 2024-07-02 | End: 2024-07-09

## 2024-07-02 RX ORDER — TRIAMCINOLONE ACETONIDE 1 MG/G
CREAM TOPICAL 2 TIMES DAILY
Qty: 15 G | Refills: 0 | Status: SHIPPED | OUTPATIENT
Start: 2024-07-02 | End: 2024-07-09

## 2024-07-02 NOTE — PROGRESS NOTES
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  She reports it has been there for about 3 weeks, it waxes and wanes and will sometimes drain.  It developed shortly after an abscess that was located near this had resolved.  Would like her to do some warm soaks and if not improving try some doxycycline if it does not resolve then return to clinic.  She was counseled to avoid the sun especially during peak hours because the doxycycline can increase her risk of sunburn and to make sure she is using sun protection.    2 days ago she was bit by a horse fly up by her left clavicle.  Today there is an area of redness that is approximately 2 x 3 cm.  Suspect local reaction.  Will get her started on some Kenalog.  She also noticed that she was starting to have some swelling at her left eyelid would like it also start doing some Zyrtec up to 2 pills twice a day for the next week.  Return to clinic if symptoms worsen or do not improve.             BMI  Estimated body mass index is 29.32 kg/m  as calculated from the following:    Height as of this encounter: 1.575 m (5' 2\").    Weight as of this encounter: 72.7 kg (160 lb 4.8 oz).             Isaac Del Angel is a 29 year old, presenting for the following health issues:  Vaginal Problem (Pt c/o bump groin area inner thigh x 3 weeks has not popped or drained. ) and Insect Bites (Pt c/o bug bite, L eye swollen.)        7/2/2024     3:17 PM   Additional Questions   Roomed by Beverley     History of Present Illness       Reason for visit:  Allergic reaction and boil type lump near groin area  Symptom onset:  1-3 days ago  Symptoms include:  Bug bite Saturday, only " "itched until today woke up and eye is swollen. Boil lump has been there about a month or more  Symptom intensity:  Mild  Symptom progression:  Staying the same  Had these symptoms before:  No  What makes it worse:  None  What makes it better:  None    She eats 2-3 servings of fruits and vegetables daily.She consumes 0 sweetened beverage(s) daily.She exercises with enough effort to increase her heart rate 10 to 19 minutes per day.  She exercises with enough effort to increase her heart rate 3 or less days per week.   She is taking medications regularly.                     Objective    /78 (BP Location: Right arm, Patient Position: Sitting)   Pulse 66   Temp 98.7  F (37.1  C) (Tympanic)   Resp 16   Ht 1.575 m (5' 2\")   Wt 72.7 kg (160 lb 4.8 oz)   LMP 06/14/2024 (Approximate)   SpO2 97%   BMI 29.32 kg/m    Body mass index is 29.32 kg/m .  Physical Exam               Signed Electronically by: Amita Ontiveros MD    "

## 2024-07-20 ENCOUNTER — HEALTH MAINTENANCE LETTER (OUTPATIENT)
Age: 29
End: 2024-07-20

## 2024-10-25 ENCOUNTER — HOSPITAL ENCOUNTER (OUTPATIENT)
Dept: ULTRASOUND IMAGING | Facility: CLINIC | Age: 29
Discharge: HOME | End: 2024-10-25
Payer: COMMERCIAL

## 2024-10-25 ENCOUNTER — OFFICE VISIT (OUTPATIENT)
Dept: FAMILY MEDICINE | Facility: CLINIC | Age: 29
End: 2024-10-25
Payer: COMMERCIAL

## 2024-10-25 VITALS
HEIGHT: 62 IN | DIASTOLIC BLOOD PRESSURE: 68 MMHG | RESPIRATION RATE: 20 BRPM | TEMPERATURE: 98.7 F | HEART RATE: 81 BPM | WEIGHT: 156 LBS | SYSTOLIC BLOOD PRESSURE: 108 MMHG | OXYGEN SATURATION: 99 % | BODY MASS INDEX: 28.71 KG/M2

## 2024-10-25 DIAGNOSIS — Z00.00 ENCOUNTER FOR PREVENTATIVE ADULT HEALTH CARE EXAMINATION: Primary | ICD-10-CM

## 2024-10-25 DIAGNOSIS — N92.6 IRREGULAR BLEEDING: ICD-10-CM

## 2024-10-25 DIAGNOSIS — I10 ESSENTIAL HYPERTENSION: ICD-10-CM

## 2024-10-25 DIAGNOSIS — Z12.4 CERVICAL CANCER SCREENING: ICD-10-CM

## 2024-10-25 LAB
ANION GAP SERPL CALCULATED.3IONS-SCNC: 9 MMOL/L (ref 7–15)
BUN SERPL-MCNC: 9.1 MG/DL (ref 6–20)
CALCIUM SERPL-MCNC: 9.1 MG/DL (ref 8.8–10.4)
CHLORIDE SERPL-SCNC: 106 MMOL/L (ref 98–107)
CREAT SERPL-MCNC: 0.71 MG/DL (ref 0.51–0.95)
EGFRCR SERPLBLD CKD-EPI 2021: >90 ML/MIN/1.73M2
GLUCOSE SERPL-MCNC: 97 MG/DL (ref 70–99)
HCO3 SERPL-SCNC: 22 MMOL/L (ref 22–29)
POTASSIUM SERPL-SCNC: 4.6 MMOL/L (ref 3.4–5.3)
SODIUM SERPL-SCNC: 137 MMOL/L (ref 135–145)
TSH SERPL DL<=0.005 MIU/L-ACNC: 2.42 UIU/ML (ref 0.3–4.2)

## 2024-10-25 PROCEDURE — 99395 PREV VISIT EST AGE 18-39: CPT

## 2024-10-25 PROCEDURE — 84443 ASSAY THYROID STIM HORMONE: CPT

## 2024-10-25 PROCEDURE — 80048 BASIC METABOLIC PNL TOTAL CA: CPT

## 2024-10-25 PROCEDURE — G0145 SCR C/V CYTO,THINLAYER,RESCR: HCPCS

## 2024-10-25 PROCEDURE — 99214 OFFICE O/P EST MOD 30 MIN: CPT | Mod: 25

## 2024-10-25 PROCEDURE — 93976 VASCULAR STUDY: CPT

## 2024-10-25 PROCEDURE — 36415 COLL VENOUS BLD VENIPUNCTURE: CPT

## 2024-10-25 SDOH — HEALTH STABILITY: PHYSICAL HEALTH: ON AVERAGE, HOW MANY DAYS PER WEEK DO YOU ENGAGE IN MODERATE TO STRENUOUS EXERCISE (LIKE A BRISK WALK)?: 3 DAYS

## 2024-10-25 SDOH — HEALTH STABILITY: PHYSICAL HEALTH: ON AVERAGE, HOW MANY MINUTES DO YOU ENGAGE IN EXERCISE AT THIS LEVEL?: 40 MIN

## 2024-10-25 ASSESSMENT — SOCIAL DETERMINANTS OF HEALTH (SDOH): HOW OFTEN DO YOU GET TOGETHER WITH FRIENDS OR RELATIVES?: ONCE A WEEK

## 2024-10-25 NOTE — PROGRESS NOTES
"Preventive Care Visit  [facility]  Rosa FalkLESLIE mosqueda CNP, Family Medicine  Oct 25, 2024      Assessment & Plan     Encounter for preventative adult health care examination  Exam without abnormal findings.    Essential hypertension  Well-controlled on amlodipine, patient wonders about stopping her amlodipine.  Patient is advised that she may trial stopping the medication and check blood pressure daily assuring that pressures do not rise or symptoms of high blood pressure develop.  - BASIC METABOLIC PANEL; Future  - BASIC METABOLIC PANEL    Cervical cancer screening  Small amount of spotting, pelvic exam otherwise normal.    Discussed with patient that sample may need to be repeated if spotting interferes with sample  - Pap Screen Reflex to HPV if ASCUS - Recommended Age 25 - 29 Years    Irregular bleeding  Spotting that has increased in frequency and duration.  Patient has had previous D&C for polyps that were thought to be causing the spotting that was effective for a few months and then spotting returned.  She declines oral contraception to see if this will help to normalize cycle.  She does not have a history of anemia.she reports polyps were not previously able to be seen on transvaginal ultrasound but she is interested in having this repeated and order is placed.  She is referred to gynecology for ongoing management.  - Ob/Gyn  Referral; Future  - US Pelvic Complete with Transvaginal; Future  - TSH with free T4 reflex; Future  - TSH with free T4 reflex            BMI  Estimated body mass index is 29 kg/m  as calculated from the following:    Height as of this encounter: 1.562 m (5' 1.5\").    Weight as of this encounter: 70.8 kg (156 lb).       Counseling  Appropriate preventive services were addressed with this patient via screening, questionnaire, or discussion as appropriate for fall prevention, nutrition, physical activity, Tobacco-use cessation, social engagement, " weight loss and cognition.  Checklist reviewing preventive services available has been given to the patient.  Reviewed patient's diet, addressing concerns and/or questions.   She is at risk for lack of exercise and has been provided with information to increase physical activity for the benefit of her well-being.   She is at risk for psychosocial distress and has been provided with information to reduce risk.           Isaac Del Angel is a 29 year old, presenting for the following:  Physical (Irregular periods. Spotting and cramping)        10/25/2024     7:55 AM   Additional Questions   Roomed by tl   Accompanied by n/a          Healthy Habits:     Taking medications regularly:  0  History of Present Illness       Reason for visit:  Abnormal vaginal bleeding between periods    She eats 2-3 servings of fruits and vegetables daily.She consumes 0 sweetened beverage(s) daily.She exercises with enough effort to increase her heart rate 10 to 19 minutes per day.  She exercises with enough effort to increase her heart rate 4 days per week.   She is taking medications regularly.            Health Care Directive  Patient does not have a Health Care Directive: Discussed advance care planning with patient; however, patient declined at this time.      10/25/2024   General Health   How would you rate your overall physical health? Good   Feel stress (tense, anxious, or unable to sleep) Only a little      (!) STRESS CONCERN      10/25/2024   Nutrition   Three or more servings of calcium each day? Yes   Diet: Regular (no restrictions)    Low salt    Low fat/cholesterol   How many servings of fruit and vegetables per day? (!) 2-3   How many sweetened beverages each day? 0-1       Multiple values from one day are sorted in reverse-chronological order         10/25/2024   Exercise   Days per week of moderate/strenous exercise 3 days   Average minutes spent exercising at this level 40 min            10/25/2024   Social Factors    Frequency of gathering with friends or relatives Once a week   Worry food won't last until get money to buy more No   Food not last or not have enough money for food? No   Do you have housing? (Housing is defined as stable permanent housing and does not include staying ouside in a car, in a tent, in an abandoned building, in an overnight shelter, or couch-surfing.) Yes   Are you worried about losing your housing? No   Lack of transportation? No   Unable to get utilities (heat,electricity)? No            10/25/2024   Dental   Dentist two times every year? Yes            10/25/2024   TB Screening   Were you born outside of the [place]? No              Today's PHQ-2 Score:       1/3/2024     1:57 PM   PHQ-2 ( 1999 [employer])   Q1: Little interest or pleasure in doing things 1    Q2: Feeling down, depressed or hopeless 0    PHQ-2 Score 1   Q1: Little interest or pleasure in doing things Several days   Q2: Feeling down, depressed or hopeless Not at all   PHQ-2 Score 1       Patient-reported         10/25/2024   Substance Use   Alcohol more than 3/day or more than 7/wk No   Do you use any other substances recreationally? No        Social History     Tobacco Use    Smoking status: Never     Passive exposure: Never    Smokeless tobacco: Never   Vaping Use    Vaping status: Never Used   Substance Use Topics    Alcohol use: Not Currently    Drug use: Never           1/12/2023   LAST FHS-7 RESULTS   1st degree relative breast or ovarian cancer No   Any relative bilateral breast cancer No   Any male have breast cancer No   Any ONE woman have BOTH breast AND ovarian cancer No   Any woman with breast cancer before 50yrs No   2 or more relatives with breast AND/OR ovarian cancer No   2 or more relatives with breast AND/OR bowel cancer No                   10/25/2024   STI Screening   New sexual partner(s) since last STI/HIV test? No        History of abnormal Pap smear: No - age 21-29 PAP every 3 years recommended        3/25/2022     "11:53 AM 8/23/2019     2:24 PM   PAP / HPV   PAP Negative for Intraepithelial Lesion or Malignancy (NILM)  Negative for squamous intraepithelial lesion or malignancy  Electronically signed by David Valle CT (ASCP) on 8/26/2019 at  3:52 PM              10/25/2024   Contraception/Family Planning   Questions about contraception or family planning No           Reviewed and updated as needed this visit by Provider                             Objective    Exam  /68 (BP Location: Right arm, Patient Position: Sitting)   Pulse 81   Temp 98.7  F (37.1  C) (Tympanic)   Resp 20   Ht 1.562 m (5' 1.5\")   Wt 70.8 kg (156 lb)   LMP 10/03/2024 (Exact Date)   SpO2 99%   BMI 29.00 kg/m     Estimated body mass index is 29 kg/m  as calculated from the following:    Height as of this encounter: 1.562 m (5' 1.5\").    Weight as of this encounter: 70.8 kg (156 lb).    Physical Exam  GENERAL: alert and no distress  NECK: no adenopathy, no asymmetry, masses, or scars  RESP: lungs clear to auscultation - no rales, rhonchi or wheezes  CV: regular rate and rhythm, normal S1 S2, no S3 or S4, no murmur, click or rub, no peripheral edema  ABDOMEN: soft, nontender, no hepatosplenomegaly, no masses and bowel sounds normal  MS: no gross musculoskeletal defects noted, no edema        Signed Electronically by: LESLIE Reyes CNP    "

## 2024-10-28 ENCOUNTER — MYC MEDICAL ADVICE (OUTPATIENT)
Dept: FAMILY MEDICINE | Facility: CLINIC | Age: 29
End: 2024-10-28
Payer: COMMERCIAL
